# Patient Record
Sex: FEMALE | Race: WHITE | Employment: UNEMPLOYED | URBAN - NONMETROPOLITAN AREA
[De-identification: names, ages, dates, MRNs, and addresses within clinical notes are randomized per-mention and may not be internally consistent; named-entity substitution may affect disease eponyms.]

---
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
## 2022-01-27 ENCOUNTER — ROUTINE PRENATAL (OUTPATIENT)
Dept: OBGYN CLINIC | Age: 22
End: 2022-01-27
Payer: COMMERCIAL

## 2022-01-27 VITALS
SYSTOLIC BLOOD PRESSURE: 94 MMHG | BODY MASS INDEX: 21.3 KG/M2 | WEIGHT: 128 LBS | DIASTOLIC BLOOD PRESSURE: 56 MMHG | HEART RATE: 86 BPM

## 2022-01-27 DIAGNOSIS — Z34.00 ENCOUNTER FOR SUPERVISION OF NORMAL INTRAUTERINE PREGNANCY IN PRIMIGRAVIDA, ANTEPARTUM: ICD-10-CM

## 2022-01-27 DIAGNOSIS — Z34.92 PRENATAL CARE, SECOND TRIMESTER: Primary | ICD-10-CM

## 2022-01-27 PROCEDURE — G8484 FLU IMMUNIZE NO ADMIN: HCPCS | Performed by: OBSTETRICS & GYNECOLOGY

## 2022-01-27 PROCEDURE — G8420 CALC BMI NORM PARAMETERS: HCPCS | Performed by: OBSTETRICS & GYNECOLOGY

## 2022-01-27 PROCEDURE — 1036F TOBACCO NON-USER: CPT | Performed by: OBSTETRICS & GYNECOLOGY

## 2022-01-27 PROCEDURE — G8427 DOCREV CUR MEDS BY ELIG CLIN: HCPCS | Performed by: OBSTETRICS & GYNECOLOGY

## 2022-01-27 PROCEDURE — 99213 OFFICE O/P EST LOW 20 MIN: CPT | Performed by: OBSTETRICS & GYNECOLOGY

## 2022-01-27 NOTE — PROGRESS NOTES
Patient's last menstrual period was 09/23/2021 (within days). Please reference prenatal and OB flow chart for further information  PT here to.day for routine prenatal care  Pt endorses fetal movement and denies loss of fluid, contractions or vaginal bleeding  Is here for prenatal visit she is doing well. She has some orthostatic changes in her blood pressure with movement. We discussed this at length. No bleeding panorama Horizon normal  ROS:  Pt denies headache, vision changes, right upper quadrant pain, dysuria, or nausea/vomiting,     PE:  BP (!) 94/56   Pulse 86   Wt 128 lb (58.1 kg)   LMP 09/23/2021 (Within Days)   BMI 21.30 kg/m²   Gen - Alert and oriented x 3  HEENT- NC/AT, CVS - RRR, Lungs - CTAB  Abd - FH 16        ASSESSMENT AND PLAN:   IUP at 16 weeks and 4 days  Plan patient to follow-up in 4 weeks. Ultrasound prior to next visit.

## 2022-02-18 ENCOUNTER — HOSPITAL ENCOUNTER (OUTPATIENT)
Dept: ULTRASOUND IMAGING | Age: 22
Discharge: HOME | End: 2022-02-20
Payer: COMMERCIAL

## 2022-02-18 DIAGNOSIS — Z34.92 PRENATAL CARE, SECOND TRIMESTER: ICD-10-CM

## 2022-02-18 PROCEDURE — 76805 OB US >/= 14 WKS SNGL FETUS: CPT

## 2022-02-24 ENCOUNTER — ROUTINE PRENATAL (OUTPATIENT)
Dept: OBGYN CLINIC | Age: 22
End: 2022-02-24
Payer: COMMERCIAL

## 2022-02-24 VITALS
BODY MASS INDEX: 22.63 KG/M2 | DIASTOLIC BLOOD PRESSURE: 60 MMHG | SYSTOLIC BLOOD PRESSURE: 102 MMHG | HEART RATE: 93 BPM | WEIGHT: 136 LBS

## 2022-02-24 DIAGNOSIS — Z34.92 PRENATAL CARE, SECOND TRIMESTER: Primary | ICD-10-CM

## 2022-02-24 PROCEDURE — G8484 FLU IMMUNIZE NO ADMIN: HCPCS | Performed by: OBSTETRICS & GYNECOLOGY

## 2022-02-24 PROCEDURE — 99213 OFFICE O/P EST LOW 20 MIN: CPT | Performed by: OBSTETRICS & GYNECOLOGY

## 2022-02-24 PROCEDURE — 1036F TOBACCO NON-USER: CPT | Performed by: OBSTETRICS & GYNECOLOGY

## 2022-02-24 PROCEDURE — G8420 CALC BMI NORM PARAMETERS: HCPCS | Performed by: OBSTETRICS & GYNECOLOGY

## 2022-02-24 PROCEDURE — G8428 CUR MEDS NOT DOCUMENT: HCPCS | Performed by: OBSTETRICS & GYNECOLOGY

## 2022-02-24 NOTE — PROGRESS NOTES
Patient's last menstrual period was 09/23/2021 (within days). Please reference prenatal and OB flow chart for further information  PT here today for routine prenatal care  Pt endorses fetal movement and denies loss of fluid, contractions or vaginal bleeding  Patient is here for prenatal visit at 20 weeks and 3 days. She is feeling fine. Ultrasound look good except one some visualized area which was a three-vessel cord  ROS:  Pt denies headache, vision changes, right upper quadrant pain, dysuria, or nausea/vomiting,     PE:  /60   Pulse 93   Wt 136 lb (61.7 kg)   LMP 09/23/2021 (Within Days)   BMI 22.63 kg/m²   Gen - Alert and oriented x 3  HEENT- NC/AT, CVS - RRR, Lungs - CTAB  Abd - FH 20        ASSESSMENT AND PLAN:   IUP at 20 weeks  Some visualized three-vessel cord  Patient to follow-up in 4 weeks.   Repeat ultrasound for serial growth as well as the visualization of the three-vessel cord

## 2022-03-21 ENCOUNTER — HOSPITAL ENCOUNTER (OUTPATIENT)
Dept: ULTRASOUND IMAGING | Age: 22
Discharge: HOME | End: 2022-03-23
Payer: COMMERCIAL

## 2022-03-21 DIAGNOSIS — Z34.92 PRENATAL CARE, SECOND TRIMESTER: ICD-10-CM

## 2022-03-21 PROCEDURE — 76817 TRANSVAGINAL US OBSTETRIC: CPT

## 2022-03-21 PROCEDURE — 76815 OB US LIMITED FETUS(S): CPT

## 2022-03-24 ENCOUNTER — ROUTINE PRENATAL (OUTPATIENT)
Dept: OBGYN CLINIC | Age: 22
End: 2022-03-24
Payer: COMMERCIAL

## 2022-03-24 VITALS
DIASTOLIC BLOOD PRESSURE: 58 MMHG | WEIGHT: 145 LBS | BODY MASS INDEX: 24.13 KG/M2 | SYSTOLIC BLOOD PRESSURE: 88 MMHG | HEART RATE: 88 BPM

## 2022-03-24 DIAGNOSIS — B00.9 HERPES SIMPLEX INFECTION IN MOTHER DURING SECOND TRIMESTER OF PREGNANCY: ICD-10-CM

## 2022-03-24 DIAGNOSIS — Z3A.24 24 WEEKS GESTATION OF PREGNANCY: ICD-10-CM

## 2022-03-24 DIAGNOSIS — O98.512 HERPES SIMPLEX INFECTION IN MOTHER DURING SECOND TRIMESTER OF PREGNANCY: ICD-10-CM

## 2022-03-24 DIAGNOSIS — Z34.02 SUPERVISION OF NORMAL FIRST PREGNANCY IN SECOND TRIMESTER: Primary | ICD-10-CM

## 2022-03-24 PROCEDURE — G8427 DOCREV CUR MEDS BY ELIG CLIN: HCPCS | Performed by: ADVANCED PRACTICE MIDWIFE

## 2022-03-24 PROCEDURE — 1036F TOBACCO NON-USER: CPT | Performed by: ADVANCED PRACTICE MIDWIFE

## 2022-03-24 PROCEDURE — 99214 OFFICE O/P EST MOD 30 MIN: CPT | Performed by: ADVANCED PRACTICE MIDWIFE

## 2022-03-24 PROCEDURE — G8420 CALC BMI NORM PARAMETERS: HCPCS | Performed by: ADVANCED PRACTICE MIDWIFE

## 2022-03-24 PROCEDURE — G8484 FLU IMMUNIZE NO ADMIN: HCPCS | Performed by: ADVANCED PRACTICE MIDWIFE

## 2022-03-24 RX ORDER — VALACYCLOVIR HYDROCHLORIDE 500 MG/1
500 TABLET, FILM COATED ORAL 2 TIMES DAILY
Qty: 180 TABLET | Refills: 1 | Status: ON HOLD | OUTPATIENT
Start: 2022-03-24 | End: 2022-07-10 | Stop reason: SDUPTHER

## 2022-03-24 ASSESSMENT — ENCOUNTER SYMPTOMS
NAUSEA: 0
CONSTIPATION: 0
DIARRHEA: 0
VOMITING: 0
SHORTNESS OF BREATH: 0
ABDOMINAL PAIN: 0

## 2022-03-24 NOTE — PROGRESS NOTES
SUBJECTIVE:  Denies bleeding, spotting, leaking of fluid, abnormal discharge. Good fetal movement.  Experiencing more frequent and prolonged HSV outbreaks. Discussed beginning suppressive therapy now and continuing until delivery.  Reviewed US results - anatomy completed and WNL, reassuring cervical length.  Discussed 28 week labs, nothing to eat/drink 2 hours before appointment time. Review of Systems   Eyes: Negative for visual disturbance. Respiratory: Negative for shortness of breath. Gastrointestinal: Negative for abdominal pain, constipation, diarrhea, nausea and vomiting. Genitourinary: Positive for genital sores. Negative for dysuria, vaginal bleeding and vaginal discharge. Neurological: Negative for headaches. OBJECTIVE:  3/21/22 US:  24w 4d, EDC 22. Cephalic presentation, anatomy complete and WNL.  grams (1lb. 8oz) at 58%. BELLA WNL. Anterior, Grade 1 placenta, not low-lying. Cervical length 4 cm. Physical Exam  Appearance:  Normal appearance  Cardiovascular:  Normal rate, Capillary refill less than 2 seconds  Pulmonary:  Normal effort, no distress  Abdominal:  No tenderness  MS:  No Swelling, No dependent edema  Skin:  Warm, dry  Neuro:  Alert and oriented x3, reflexes normal.  Psychiatric:  Normal mood and behavior    ASSESSMENT:  23 y.o. female  IUP at 24w3d  Anatomy complete and WNL  Reassuring cervical length    Patient Active Problem List    Diagnosis Date Noted    Herpes simplex infection in mother during second trimester of pregnancy 2022     3/24/22:  Suppressive therapy started      Marijuana use 2021    Mild episode of recurrent major depressive disorder ([facility] [address] 2021    Anxiety 2017      Diagnosis Orders   1. Supervision of normal first pregnancy in second trimester  CBC with Auto Differential    RPR Reflex to Titer and TPPA    Glucose tolerance, 1 hour   2. 24 weeks gestation of pregnancy     3.  Herpes simplex infection in mother during second trimester of pregnancy  valACYclovir (VALTREX) 500 MG tablet       PLAN:  Start Valtrex suppressive therapy  28 week labs at next visit     Follow-Up  Return in about 4 weeks (around 4/21/2022) for Prenatal Visit with 1 hour Glucose Tolerance Test.    DENISE Davis CNM

## 2022-04-28 ENCOUNTER — ROUTINE PRENATAL (OUTPATIENT)
Dept: OBGYN CLINIC | Age: 22
End: 2022-04-28
Payer: COMMERCIAL

## 2022-04-28 VITALS
WEIGHT: 157.6 LBS | SYSTOLIC BLOOD PRESSURE: 102 MMHG | BODY MASS INDEX: 26.23 KG/M2 | DIASTOLIC BLOOD PRESSURE: 68 MMHG | HEART RATE: 94 BPM

## 2022-04-28 DIAGNOSIS — Z3A.29 29 WEEKS GESTATION OF PREGNANCY: ICD-10-CM

## 2022-04-28 DIAGNOSIS — Z34.03 SUPERVISION OF NORMAL FIRST PREGNANCY IN THIRD TRIMESTER: Primary | ICD-10-CM

## 2022-04-28 DIAGNOSIS — Z23 NEED FOR TDAP VACCINATION: ICD-10-CM

## 2022-04-28 DIAGNOSIS — Z34.02 SUPERVISION OF NORMAL FIRST PREGNANCY IN SECOND TRIMESTER: ICD-10-CM

## 2022-04-28 LAB
BASOPHILS ABSOLUTE: 0 K/UL (ref 0–0.2)
BASOPHILS RELATIVE PERCENT: 0.2 %
EOSINOPHILS ABSOLUTE: 0.1 K/UL (ref 0–0.7)
EOSINOPHILS RELATIVE PERCENT: 0.6 %
GLUCOSE, 1HR PP: 71 MG/DL (ref 60–140)
HCT VFR BLD CALC: 33.8 % (ref 37–47)
HEMOGLOBIN: 11.2 G/DL (ref 12–16)
LYMPHOCYTES ABSOLUTE: 1.6 K/UL (ref 1–4.8)
LYMPHOCYTES RELATIVE PERCENT: 14.3 %
MCH RBC QN AUTO: 30.1 PG (ref 27–31.3)
MCHC RBC AUTO-ENTMCNC: 33.2 % (ref 33–37)
MCV RBC AUTO: 90.6 FL (ref 82–100)
MONOCYTES ABSOLUTE: 0.9 K/UL (ref 0.2–0.8)
MONOCYTES RELATIVE PERCENT: 8.3 %
NEUTROPHILS ABSOLUTE: 8.6 K/UL (ref 1.4–6.5)
NEUTROPHILS RELATIVE PERCENT: 76.6 %
PDW BLD-RTO: 13.6 % (ref 11.5–14.5)
PLATELET # BLD: 168 K/UL (ref 130–400)
RBC # BLD: 3.73 M/UL (ref 4.2–5.4)
WBC # BLD: 11.2 K/UL (ref 4.5–11)

## 2022-04-28 PROCEDURE — 99213 OFFICE O/P EST LOW 20 MIN: CPT | Performed by: ADVANCED PRACTICE MIDWIFE

## 2022-04-28 PROCEDURE — 1036F TOBACCO NON-USER: CPT | Performed by: ADVANCED PRACTICE MIDWIFE

## 2022-04-28 PROCEDURE — G8419 CALC BMI OUT NRM PARAM NOF/U: HCPCS | Performed by: ADVANCED PRACTICE MIDWIFE

## 2022-04-28 PROCEDURE — 90715 TDAP VACCINE 7 YRS/> IM: CPT | Performed by: ADVANCED PRACTICE MIDWIFE

## 2022-04-28 PROCEDURE — G8427 DOCREV CUR MEDS BY ELIG CLIN: HCPCS | Performed by: ADVANCED PRACTICE MIDWIFE

## 2022-04-28 PROCEDURE — 90471 IMMUNIZATION ADMIN: CPT | Performed by: ADVANCED PRACTICE MIDWIFE

## 2022-04-28 ASSESSMENT — ENCOUNTER SYMPTOMS
DIARRHEA: 0
CONSTIPATION: 0
ABDOMINAL PAIN: 0
NAUSEA: 0
VOMITING: 0
SHORTNESS OF BREATH: 0

## 2022-04-28 NOTE — PROGRESS NOTES
SUBJECTIVE:  Denies bleeding, spotting, leaking of fluid, abnormal discharge. Good fetal movement.  Collecting 28 week labs today, willing to receive the Tdap vaccine. Review of Systems   Eyes: Negative for visual disturbance. Respiratory: Negative for shortness of breath. Gastrointestinal: Negative for abdominal pain, constipation, diarrhea, nausea and vomiting. Genitourinary: Negative for dysuria, vaginal bleeding and vaginal discharge. Neurological: Negative for headaches. OBJECTIVE:  Physical Exam  Appearance:  Normal appearance  Cardiovascular:  Normal rate, Capillary refill less than 2 seconds  Pulmonary:  Normal effort, no distress  Abdominal:  No tenderness  MS:  No Swelling, No dependent edema  Skin:  Warm, dry  Neuro:  Alert and oriented x3, reflexes normal.   Psychiatric:  Normal mood and behavior    ASSESSMENT:  23 y.o. female  IUP at 29w3d    Patient Active Problem List    Diagnosis Date Noted    Herpes simplex infection in mother during second trimester of pregnancy 2022     3/24/22:  Suppressive therapy started      Marijuana use 2021    Mild episode of recurrent major depressive disorder ([facility] [address] 2021    Anxiety 2017      Diagnosis Orders   1. Supervision of normal first pregnancy in third trimester     2. 29 weeks gestation of pregnancy     3. Need for Tdap vaccination         PLAN:  Collecting 28 week labs  Tdap vaccine     Follow-Up  Return in about 2 weeks (around 2022) for Prenatal Care Visit.     DENISE Roberto CNM

## 2022-04-29 LAB — RPR: NORMAL

## 2022-05-19 ENCOUNTER — ROUTINE PRENATAL (OUTPATIENT)
Dept: OBGYN CLINIC | Age: 22
End: 2022-05-19
Payer: COMMERCIAL

## 2022-05-19 VITALS
SYSTOLIC BLOOD PRESSURE: 100 MMHG | DIASTOLIC BLOOD PRESSURE: 64 MMHG | BODY MASS INDEX: 27.36 KG/M2 | HEART RATE: 104 BPM | WEIGHT: 164.4 LBS

## 2022-05-19 DIAGNOSIS — Z36.89 ENCOUNTER FOR ULTRASOUND TO ASSESS INTERVAL GROWTH OF FETUS: ICD-10-CM

## 2022-05-19 DIAGNOSIS — Z34.93 PRENATAL CARE, THIRD TRIMESTER: Primary | ICD-10-CM

## 2022-05-19 DIAGNOSIS — Z3A.32 32 WEEKS GESTATION OF PREGNANCY: ICD-10-CM

## 2022-05-19 PROCEDURE — G8419 CALC BMI OUT NRM PARAM NOF/U: HCPCS | Performed by: ADVANCED PRACTICE MIDWIFE

## 2022-05-19 PROCEDURE — 99213 OFFICE O/P EST LOW 20 MIN: CPT | Performed by: ADVANCED PRACTICE MIDWIFE

## 2022-05-19 PROCEDURE — 1036F TOBACCO NON-USER: CPT | Performed by: ADVANCED PRACTICE MIDWIFE

## 2022-05-19 PROCEDURE — G8427 DOCREV CUR MEDS BY ELIG CLIN: HCPCS | Performed by: ADVANCED PRACTICE MIDWIFE

## 2022-05-19 ASSESSMENT — ENCOUNTER SYMPTOMS
SHORTNESS OF BREATH: 0
VOMITING: 0
CONSTIPATION: 0
NAUSEA: 0
ABDOMINAL PAIN: 0
DIARRHEA: 0

## 2022-05-19 NOTE — PROGRESS NOTES
SUBJECTIVE:  Denies bleeding, spotting, leaking of fluid, abnormal discharge. Good fetal movement.  Doing well, no concerns   Discussed scheduling a growth US in the next 1-2 weeks.  Reviewed 28 week labs, normal 1 hour GTT. Review of Systems   Eyes: Negative for visual disturbance. Respiratory: Negative for shortness of breath. Gastrointestinal: Negative for abdominal pain, constipation, diarrhea, nausea and vomiting. Genitourinary: Negative for dysuria, vaginal bleeding and vaginal discharge. Neurological: Negative for headaches. OBJECTIVE:  22 Labs:  · 1 Hour GTT - 71  · RPR - Non-Reactive  · Hgb/Hct - 11.2 & 34%    Physical Exam  Appearance:  Normal appearance  Cardiovascular:  Normal rate, Capillary refill less than 2 seconds  Pulmonary:  Normal effort, no distress  Abdominal:  No tenderness  MS:  No Swelling, No dependent edema  Skin:  Warm, dry  Neuro:  Alert and oriented x3, reflexes normal.  Psychiatric:  Normal mood and behavior    ASSESSMENT:  [address] y.o. female  IUP at 32w3d    Patient Active Problem List    Diagnosis Date Noted    Herpes simplex infection in mother during second trimester of pregnancy 2022     3/24/22:  Suppressive therapy started      Marijuana use 2021    Mild episode of recurrent major depressive disorder ([facility] [address] 2021    Anxiety 2017      Diagnosis Orders   1. Prenatal care, third trimester  US OB 1 OR MORE FETUS LIMITED   2. 32 week prematurity     3. Encounter for ultrasound to assess interval growth of fetus  US OB 1 OR MORE FETUS LIMITED       PLAN:  Schedule growth US in 1-2 weeks    Follow-Up  Return in about 2 weeks (around 2022) for Prenatal Care Visit.     DENISE Spears CNM

## 2022-05-26 ENCOUNTER — HOSPITAL ENCOUNTER (OUTPATIENT)
Dept: ULTRASOUND IMAGING | Age: 22
Discharge: HOME | End: 2022-05-28
Payer: COMMERCIAL

## 2022-05-26 DIAGNOSIS — Z36.89 ENCOUNTER FOR ULTRASOUND TO ASSESS INTERVAL GROWTH OF FETUS: ICD-10-CM

## 2022-05-26 DIAGNOSIS — Z34.93 PRENATAL CARE, THIRD TRIMESTER: ICD-10-CM

## 2022-05-26 PROCEDURE — 76815 OB US LIMITED FETUS(S): CPT

## 2022-05-31 NOTE — RESULT ENCOUNTER NOTE
5/26/22 US:  33w 6d, EDC 7/8/22. Cephalic presentation, anatomy WNL. EFW 2222 grams (4lb. 14oz) at 46%. BELLA 9.5 cm. Anterior, Grade 3 placenta. Cervical length 3.3 cm.

## 2022-06-02 ENCOUNTER — ROUTINE PRENATAL (OUTPATIENT)
Dept: OBGYN CLINIC | Age: 22
End: 2022-06-02
Payer: COMMERCIAL

## 2022-06-02 VITALS
BODY MASS INDEX: 28.69 KG/M2 | SYSTOLIC BLOOD PRESSURE: 102 MMHG | DIASTOLIC BLOOD PRESSURE: 68 MMHG | WEIGHT: 172.4 LBS | HEART RATE: 119 BPM

## 2022-06-02 DIAGNOSIS — Z3A.34 34 WEEKS GESTATION OF PREGNANCY: ICD-10-CM

## 2022-06-02 DIAGNOSIS — Z34.93 PRENATAL CARE, THIRD TRIMESTER: Primary | ICD-10-CM

## 2022-06-02 PROCEDURE — G8419 CALC BMI OUT NRM PARAM NOF/U: HCPCS | Performed by: ADVANCED PRACTICE MIDWIFE

## 2022-06-02 PROCEDURE — G8427 DOCREV CUR MEDS BY ELIG CLIN: HCPCS | Performed by: ADVANCED PRACTICE MIDWIFE

## 2022-06-02 PROCEDURE — 99212 OFFICE O/P EST SF 10 MIN: CPT | Performed by: ADVANCED PRACTICE MIDWIFE

## 2022-06-02 PROCEDURE — 1036F TOBACCO NON-USER: CPT | Performed by: ADVANCED PRACTICE MIDWIFE

## 2022-06-02 ASSESSMENT — ENCOUNTER SYMPTOMS
DIARRHEA: 0
SHORTNESS OF BREATH: 0
VOMITING: 0
ABDOMINAL PAIN: 0
CONSTIPATION: 0
NAUSEA: 0

## 2022-06-09 ENCOUNTER — ROUTINE PRENATAL (OUTPATIENT)
Dept: OBGYN CLINIC | Age: 22
End: 2022-06-09
Payer: COMMERCIAL

## 2022-06-09 VITALS
WEIGHT: 174.2 LBS | BODY MASS INDEX: 28.99 KG/M2 | DIASTOLIC BLOOD PRESSURE: 66 MMHG | SYSTOLIC BLOOD PRESSURE: 102 MMHG | HEART RATE: 119 BPM

## 2022-06-09 DIAGNOSIS — Z3A.35 35 WEEKS GESTATION OF PREGNANCY: ICD-10-CM

## 2022-06-09 DIAGNOSIS — Z34.03 PRENATAL CARE, FIRST PREGNANCY, THIRD TRIMESTER: ICD-10-CM

## 2022-06-09 DIAGNOSIS — Z34.03 PRENATAL CARE, FIRST PREGNANCY, THIRD TRIMESTER: Primary | ICD-10-CM

## 2022-06-09 PROCEDURE — 99212 OFFICE O/P EST SF 10 MIN: CPT | Performed by: ADVANCED PRACTICE MIDWIFE

## 2022-06-09 PROCEDURE — G8419 CALC BMI OUT NRM PARAM NOF/U: HCPCS | Performed by: ADVANCED PRACTICE MIDWIFE

## 2022-06-09 PROCEDURE — 1036F TOBACCO NON-USER: CPT | Performed by: ADVANCED PRACTICE MIDWIFE

## 2022-06-09 PROCEDURE — G8428 CUR MEDS NOT DOCUMENT: HCPCS | Performed by: ADVANCED PRACTICE MIDWIFE

## 2022-06-09 ASSESSMENT — ENCOUNTER SYMPTOMS
VOMITING: 0
NAUSEA: 0
DIARRHEA: 0
SHORTNESS OF BREATH: 0
ABDOMINAL PAIN: 0
CONSTIPATION: 0

## 2022-06-09 NOTE — PROGRESS NOTES
SUBJECTIVE:  Denies bleeding, spotting, leaking of fluid, abnormal discharge. Good fetal movement.  Doing well, no concerns.  Reviewed screening for GBS.  History of HSV, taking Valtrex as prescribed.  Next visit with Dr. Lisa Hutchison    Review of Systems   Eyes: Negative for visual disturbance. Respiratory: Negative for shortness of breath. Gastrointestinal: Negative for abdominal pain, constipation, diarrhea, nausea and vomiting. Genitourinary: Negative for dysuria, vaginal bleeding and vaginal discharge. Neurological: Negative for headaches. OBJECTIVE:  Physical Exam  Appearance:  Normal appearance  Cardiovascular:  Normal rate, Capillary refill less than 2 seconds  Pulmonary:  Normal effort, no distress  Abdominal:  No tenderness  MS:  No Swelling, No dependent edema  Skin:  Warm, dry  Neuro:  Alert and oriented x3, reflexes normal.  Psychiatric:  Normal mood and behavior    ASSESSMENT:  23 y.o. female  IUP at 35w3d    Patient Active Problem List    Diagnosis Date Noted    Herpes simplex infection in mother during second trimester of pregnancy 2022     3/24/22:  Suppressive therapy started      Marijuana use 2021    Mild episode of recurrent major depressive disorder ([facility] [address] 2021    Anxiety 2017      Diagnosis Orders   1. Prenatal care, first pregnancy, third trimester  Culture, Strep B Screen, Vaginal/Rectal   2. 35 weeks gestation of pregnancy         PLAN:  HSV - suppressive therapy started 3/24/22  GBS collected  Next visit with Dr. Espinosa Shoulder  Return in about 1 week (around 2022) for Prenatal Care Visit with Lisa Hutchison ([facility]).     DENISE Clarke CNM

## 2022-06-12 LAB — GROUP B STREP CULTURE: NORMAL

## 2022-06-16 ENCOUNTER — ROUTINE PRENATAL (OUTPATIENT)
Dept: OBGYN CLINIC | Age: 22
End: 2022-06-16
Payer: COMMERCIAL

## 2022-06-16 VITALS
DIASTOLIC BLOOD PRESSURE: 66 MMHG | WEIGHT: 177 LBS | BODY MASS INDEX: 29.45 KG/M2 | SYSTOLIC BLOOD PRESSURE: 102 MMHG | HEART RATE: 99 BPM

## 2022-06-16 DIAGNOSIS — R30.0 DYSURIA: ICD-10-CM

## 2022-06-16 DIAGNOSIS — B37.31 YEAST VAGINITIS: ICD-10-CM

## 2022-06-16 DIAGNOSIS — R30.0 DYSURIA: Primary | ICD-10-CM

## 2022-06-16 DIAGNOSIS — Z34.03 PRENATAL CARE, FIRST PREGNANCY, THIRD TRIMESTER: ICD-10-CM

## 2022-06-16 LAB
BACTERIA: ABNORMAL /HPF
BILIRUBIN URINE: NEGATIVE
BLOOD, URINE: ABNORMAL
CLARITY: ABNORMAL
COLOR: YELLOW
EPITHELIAL CELLS, UA: ABNORMAL /HPF (ref 0–5)
GLUCOSE URINE: NEGATIVE MG/DL
HYALINE CASTS: ABNORMAL /HPF (ref 0–5)
KETONES, URINE: NEGATIVE MG/DL
LEUKOCYTE ESTERASE, URINE: ABNORMAL
NITRITE, URINE: NEGATIVE
PH UA: 7 (ref 5–9)
PROTEIN UA: ABNORMAL MG/DL
RBC UA: ABNORMAL /HPF (ref 0–5)
SPECIFIC GRAVITY UA: 1.01 (ref 1–1.03)
UROBILINOGEN, URINE: 0.2 E.U./DL
WBC UA: >100 /HPF (ref 0–5)
YEAST: PRESENT /HPF

## 2022-06-16 PROCEDURE — 99213 OFFICE O/P EST LOW 20 MIN: CPT | Performed by: OBSTETRICS & GYNECOLOGY

## 2022-06-16 PROCEDURE — G8419 CALC BMI OUT NRM PARAM NOF/U: HCPCS | Performed by: OBSTETRICS & GYNECOLOGY

## 2022-06-16 PROCEDURE — G8428 CUR MEDS NOT DOCUMENT: HCPCS | Performed by: OBSTETRICS & GYNECOLOGY

## 2022-06-16 PROCEDURE — 1036F TOBACCO NON-USER: CPT | Performed by: OBSTETRICS & GYNECOLOGY

## 2022-06-16 NOTE — PROGRESS NOTES
+ blood . Leuk, and protein in urine. Sent UA/ CS . Vaginal pressure, brownish tint when wiping. Feels like she needs to have a bm as well.  Pt is heaving regular bowel movements

## 2022-06-16 NOTE — PROGRESS NOTES
Patient's last menstrual period was 09/23/2021 (within days). Please reference prenatal and OB flow chart for further information  PT here today for routine prenatal care  Pt endorses fetal movement and denies loss of fluid, contractions or vaginal bleeding  Presents for prenatal visit at 36 weeks and 3 days.   She complains of rectal pressure vaginal pressure and brownish discharge  ROS:  Pt denies headache, vision changes, right upper quadrant pain, dysuria, or nausea/vomiting,     PE:  /66   Pulse 99   Wt 177 lb (80.3 kg)   LMP 09/23/2021 (Within Days)   BMI 29.45 kg/m²   Gen - Alert and oriented x 3  HEENT- NC/AT, CVS - RRR, Lungs - CTAB  Abd - FH 37    Cervix is 2 cm dilated 50% effaced vertex is at a -1 station hard stool was palpated in the rectum and she has a heavy yeast discharge      ASSESSMENT AND PLAN:   IUP at 36 weeks  Yeast vaginitis  Terazol 7  Patient reassured about what she is feeling at this stage of the pregnancy  Patient to follow-up in 1 week

## 2022-06-17 LAB — URINE CULTURE, ROUTINE: NORMAL

## 2022-06-23 ENCOUNTER — ROUTINE PRENATAL (OUTPATIENT)
Dept: OBGYN CLINIC | Age: 22
End: 2022-06-23
Payer: COMMERCIAL

## 2022-06-23 VITALS
SYSTOLIC BLOOD PRESSURE: 98 MMHG | HEART RATE: 96 BPM | WEIGHT: 181 LBS | BODY MASS INDEX: 30.12 KG/M2 | DIASTOLIC BLOOD PRESSURE: 70 MMHG

## 2022-06-23 DIAGNOSIS — Z3A.37 37 WEEKS GESTATION OF PREGNANCY: ICD-10-CM

## 2022-06-23 DIAGNOSIS — Z34.93 PRENATAL CARE, THIRD TRIMESTER: Primary | ICD-10-CM

## 2022-06-23 PROCEDURE — H1000 PRENATAL CARE ATRISK ASSESSM: HCPCS | Performed by: ADVANCED PRACTICE MIDWIFE

## 2022-06-23 PROCEDURE — G8427 DOCREV CUR MEDS BY ELIG CLIN: HCPCS | Performed by: ADVANCED PRACTICE MIDWIFE

## 2022-06-23 PROCEDURE — 99212 OFFICE O/P EST SF 10 MIN: CPT | Performed by: ADVANCED PRACTICE MIDWIFE

## 2022-06-23 PROCEDURE — 1036F TOBACCO NON-USER: CPT | Performed by: ADVANCED PRACTICE MIDWIFE

## 2022-06-23 PROCEDURE — G8419 CALC BMI OUT NRM PARAM NOF/U: HCPCS | Performed by: ADVANCED PRACTICE MIDWIFE

## 2022-06-23 ASSESSMENT — ENCOUNTER SYMPTOMS
ABDOMINAL PAIN: 0
VOMITING: 0
CONSTIPATION: 0
DIARRHEA: 0
SHORTNESS OF BREATH: 0
NAUSEA: 0

## 2022-06-30 ENCOUNTER — ROUTINE PRENATAL (OUTPATIENT)
Dept: OBGYN CLINIC | Age: 22
End: 2022-06-30
Payer: COMMERCIAL

## 2022-06-30 VITALS
BODY MASS INDEX: 31.22 KG/M2 | SYSTOLIC BLOOD PRESSURE: 110 MMHG | WEIGHT: 187.6 LBS | DIASTOLIC BLOOD PRESSURE: 68 MMHG | HEART RATE: 112 BPM

## 2022-06-30 DIAGNOSIS — Z3A.38 38 WEEKS GESTATION OF PREGNANCY: ICD-10-CM

## 2022-06-30 DIAGNOSIS — Z34.93 PRENATAL CARE, THIRD TRIMESTER: Primary | ICD-10-CM

## 2022-06-30 PROCEDURE — 99212 OFFICE O/P EST SF 10 MIN: CPT | Performed by: ADVANCED PRACTICE MIDWIFE

## 2022-06-30 PROCEDURE — G8419 CALC BMI OUT NRM PARAM NOF/U: HCPCS | Performed by: ADVANCED PRACTICE MIDWIFE

## 2022-06-30 PROCEDURE — 1036F TOBACCO NON-USER: CPT | Performed by: ADVANCED PRACTICE MIDWIFE

## 2022-06-30 PROCEDURE — G8427 DOCREV CUR MEDS BY ELIG CLIN: HCPCS | Performed by: ADVANCED PRACTICE MIDWIFE

## 2022-06-30 ASSESSMENT — ENCOUNTER SYMPTOMS
ABDOMINAL PAIN: 0
CONSTIPATION: 0
VOMITING: 0
NAUSEA: 0
DIARRHEA: 0
SHORTNESS OF BREATH: 0

## 2022-06-30 NOTE — PROGRESS NOTES
SUBJECTIVE:  Denies bleeding, spotting, leaking of fluid, abnormal discharge. Good fetal movement.  Doing well, no concerns.  Prefers to wait for natural labor onset    Review of Systems   Eyes: Negative for visual disturbance. Respiratory: Negative for shortness of breath. Gastrointestinal: Negative for abdominal pain, constipation, diarrhea, nausea and vomiting. Genitourinary: Negative for dysuria, vaginal bleeding and vaginal discharge. Neurological: Negative for headaches. OBJECTIVE:  Cervical Exam:  3/25%/-3, posterior, medium, no show. Physical Exam  Appearance:  Normal appearance  Cardiovascular:  Normal rate, Capillary refill less than 2 seconds  Pulmonary:  Normal effort, no distress  Abdominal:  No tenderness  MS:  No Swelling, No dependent edema  Skin:  Warm, dry  Neuro:  Alert and oriented x3, reflexes normal.  Psychiatric:  Normal mood and behavior    ASSESSMENT:  23 y.o. female  IUP at 38w3d  GBS Negative    Patient Active Problem List    Diagnosis Date Noted    Herpes simplex infection in mother during second trimester of pregnancy 2022     3/24/22:  Suppressive therapy started      Marijuana use 2021    Mild episode of recurrent major depressive disorder ([facility] [address] 2021    Anxiety 2017      Diagnosis Orders   1. Prenatal care, third trimester     2. 38 weeks gestation of pregnancy         PLAN:    Follow-Up  Return in about 1 week (around 2022) for Prenatal Care Visit.     DENISE Neves CNM

## 2022-07-05 ENCOUNTER — HOSPITAL ENCOUNTER (OUTPATIENT)
Age: 22
Discharge: HOME | DRG: 560 | End: 2022-07-05
Attending: ADVANCED PRACTICE MIDWIFE | Admitting: OBSTETRICS & GYNECOLOGY
Payer: COMMERCIAL

## 2022-07-05 VITALS
DIASTOLIC BLOOD PRESSURE: 76 MMHG | RESPIRATION RATE: 16 BRPM | SYSTOLIC BLOOD PRESSURE: 138 MMHG | TEMPERATURE: 98.2 F | OXYGEN SATURATION: 98 % | HEART RATE: 96 BPM

## 2022-07-05 LAB
AMPHETAMINE SCREEN, URINE: NORMAL
BACTERIA: ABNORMAL /HPF
BARBITURATE SCREEN URINE: NORMAL
BENZODIAZEPINE SCREEN, URINE: NORMAL
BILIRUBIN URINE: NEGATIVE
BLOOD, URINE: NEGATIVE
CANNABINOID SCREEN URINE: NORMAL
CLARITY: ABNORMAL
COCAINE METABOLITE SCREEN URINE: NORMAL
COLOR: YELLOW
EPITHELIAL CELLS, UA: ABNORMAL /HPF (ref 0–5)
FENTANYL SCREEN, URINE: NORMAL
GLUCOSE URINE: NEGATIVE MG/DL
HYALINE CASTS: ABNORMAL /HPF (ref 0–5)
KETONES, URINE: NEGATIVE MG/DL
LEUKOCYTE ESTERASE, URINE: ABNORMAL
Lab: NORMAL
METHADONE SCREEN, URINE: NORMAL
NITRITE, URINE: NEGATIVE
OPIATE SCREEN URINE: NORMAL
OXYCODONE URINE: NORMAL
PH UA: 7 (ref 5–9)
PHENCYCLIDINE SCREEN URINE: NORMAL
PROPOXYPHENE SCREEN: NORMAL
PROTEIN UA: NEGATIVE MG/DL
RBC UA: ABNORMAL /HPF (ref 0–5)
SPECIFIC GRAVITY UA: 1.01 (ref 1–1.03)
URINE REFLEX TO CULTURE: YES
UROBILINOGEN, URINE: 0.2 E.U./DL
WBC UA: ABNORMAL /HPF (ref 0–5)

## 2022-07-05 PROCEDURE — 81001 URINALYSIS AUTO W/SCOPE: CPT

## 2022-07-05 PROCEDURE — 99283 EMERGENCY DEPT VISIT LOW MDM: CPT

## 2022-07-05 PROCEDURE — 99213 OFFICE O/P EST LOW 20 MIN: CPT | Performed by: OBSTETRICS & GYNECOLOGY

## 2022-07-05 PROCEDURE — 80307 DRUG TEST PRSMV CHEM ANLYZR: CPT

## 2022-07-05 PROCEDURE — 87086 URINE CULTURE/COLONY COUNT: CPT

## 2022-07-05 NOTE — ED TRIAGE NOTES
Department of Obstetrics and Gynecology  Labor and Delivery  Irma Jimenes MD: [facility] Triage Note      SUBJECTIVE:  Patient is a 24 yo  female @ 39.1 weeks who presents with one week of irregular contractions, that worsened since 0600 this morning. She denies any LOF, VB, or VD. She reports +FM. She had recent yeast infection, treated with OTC medications. She denies any recent coitus. She was last seen in office on 2022 and she was 3 cm dilated. OBJECTIVE    ROS:   Gen: Negative  Abdomen: +FM, contractions  Pelvis: Pressure  Rest of systems reviewed and found to be negative. Vitals:  /76 Comment: left arm  Pulse 96   Temp 98.2 °F (36.8 °C) (Oral)   Resp 16   LMP 2021 (Within Days)   SpO2 98%     PE:   Gen: AxO x 3, in NAD  CV: RRR  Lungs: CTAB  Abdomen: Gravid, +FM  Cervix:           Dilation: 3         Effacement: 30        Station:  -3         Fetal heart rate:  Category 1       Baseline Heart Rate:  140s R       Accelerations:  present       Decelerations:  none       Variability:  moderate    Contraction frequency: q3-5 minutes      DATA:  Results     Component Value Units   Urine Drug Screen [[id number]]    Collected: 22    Updated: 22 104    Specimen Source: Urine, clean catch     Amphetamine Screen, Urine Neg    Barbiturate Screen, Ur Neg    Benzodiazepine Screen, Urine Neg    Cannabinoid Scrn, Ur Neg    Cocaine Metabolite Screen, Urine Neg    Opiate Scrn, Ur Neg    PCP Screen, Urine Neg    Methadone Screen, Urine Neg    Propoxyphene Scrn, Ur Neg    Oxycodone Urine Neg    FENTANYL SCREEN, URINE Neg    Drug Screen Comment: see below    Comment: This method is a screening test to detect only these drug   classes as part of a medical workup.  Confirmatory testing   by another method should be ordered if clinically indicated.        Culture, Urine [[id number]]    Collected: 22 103    Updated: 22 104    Urinalysis with Reflex to Culture [[id number]] (Abnormal)    Collected: 07/05/22 1025    Updated: 07/05/22 1038    Specimen Source: Urine     Color, UA Yellow    Clarity, UA CLOUDY Abnormal     Glucose, Ur Negative mg/dL    Bilirubin Urine Negative    Ketones, Urine Negative mg/dL    Specific Gravity, UA 1.011    Blood, Urine Negative    pH, UA 7.0    Protein, UA Negative mg/dL    Urobilinogen, Urine 0.2 E.U./dL    Nitrite, Urine Negative    Leukocyte Esterase, Urine LARGE Abnormal     Urine Reflex to Culture Yes   Microscopic Urinalysis [[id number]] (Abnormal)    Collected: 07/05/22 1025    Updated: 07/05/22 1038     Bacteria, UA MODERATE Abnormal  /HPF    Hyaline Casts, UA 3-5 /HPF    WBC, UA  Abnormal  /HPF    RBC, UA 0-2 /HPF    Epithelial Cells, UA 6-10          ASSESSMENT & PLAN:      Assessment:   1. IUP @ 39.1 weeks  2. Latent Labor  3. Possible UTI      Plan:   1. NST reactive. Reassurance given  2. No cervical change since 6/30/2022. Will discharge home with labor precautions, fetal kick counts, and to follow up with Pako Nolen on 7/7/22. 3. Urine culture pending. Will treat accordingly.

## 2022-07-05 NOTE — PROGRESS NOTES
Dr. Audra Arboleda bedside to speak with patient. Patient given the option to walk and see if cervical change is made or be discharged to home and return if contractions progressively get worse. Patient would like to be discharged at this time. Labor precautions given.

## 2022-07-05 NOTE — PROGRESS NOTES
Patient came into triage stating that she starting boo at about 0630 and the pain has increased. Patient denies leaking of fluid, vaginal bleeding, and recent sexual intercourse. Patient states positive fetal movement. Patient denies headache, blurred vision, vomiting (but a little bit of nausea), and constipation. Patient states \"softer\" stools for about the past two days.

## 2022-07-05 NOTE — PROGRESS NOTES
Discharge paperwork reviewed with patient. Labor precautions given. Patient verbalized understanding and denied any further questions at this time. Patient ambulated off OB unit in stable condition.

## 2022-07-06 LAB — URINE CULTURE, ROUTINE: NORMAL

## 2022-07-07 ENCOUNTER — ROUTINE PRENATAL (OUTPATIENT)
Dept: OBGYN CLINIC | Age: 22
End: 2022-07-07
Payer: COMMERCIAL

## 2022-07-07 VITALS
SYSTOLIC BLOOD PRESSURE: 118 MMHG | DIASTOLIC BLOOD PRESSURE: 88 MMHG | HEART RATE: 104 BPM | BODY MASS INDEX: 31.45 KG/M2 | WEIGHT: 189 LBS

## 2022-07-07 DIAGNOSIS — N89.8 VAGINAL DISCHARGE: ICD-10-CM

## 2022-07-07 DIAGNOSIS — B96.89 BACTERIAL VAGINITIS: ICD-10-CM

## 2022-07-07 DIAGNOSIS — B37.31 CANDIDAL VAGINITIS: ICD-10-CM

## 2022-07-07 DIAGNOSIS — N89.8 VAGINAL IRRITATION: ICD-10-CM

## 2022-07-07 DIAGNOSIS — N76.0 BACTERIAL VAGINITIS: ICD-10-CM

## 2022-07-07 DIAGNOSIS — Z3A.39 39 WEEKS GESTATION OF PREGNANCY: ICD-10-CM

## 2022-07-07 DIAGNOSIS — Z34.93 PRENATAL CARE, THIRD TRIMESTER: Primary | ICD-10-CM

## 2022-07-07 LAB
CLUE CELLS: NORMAL
TRICHOMONAS PREP: NORMAL
TRICHOMONAS VAGINALIS SCREEN: NEGATIVE
YEAST WET PREP: NORMAL

## 2022-07-07 PROCEDURE — G8419 CALC BMI OUT NRM PARAM NOF/U: HCPCS | Performed by: ADVANCED PRACTICE MIDWIFE

## 2022-07-07 PROCEDURE — 1036F TOBACCO NON-USER: CPT | Performed by: ADVANCED PRACTICE MIDWIFE

## 2022-07-07 PROCEDURE — G8427 DOCREV CUR MEDS BY ELIG CLIN: HCPCS | Performed by: ADVANCED PRACTICE MIDWIFE

## 2022-07-07 PROCEDURE — 99214 OFFICE O/P EST MOD 30 MIN: CPT | Performed by: ADVANCED PRACTICE MIDWIFE

## 2022-07-07 RX ORDER — FLUCONAZOLE 150 MG/1
TABLET ORAL
Qty: 3 TABLET | Refills: 1 | Status: SHIPPED | OUTPATIENT
Start: 2022-07-07 | End: 2022-09-15

## 2022-07-07 RX ORDER — METRONIDAZOLE 500 MG/1
500 TABLET ORAL 2 TIMES DAILY
Qty: 14 TABLET | Refills: 1 | Status: SHIPPED | OUTPATIENT
Start: 2022-07-07 | End: 2022-09-15

## 2022-07-07 ASSESSMENT — ENCOUNTER SYMPTOMS
VOMITING: 0
ABDOMINAL PAIN: 0
DIARRHEA: 0
CONSTIPATION: 0
SHORTNESS OF BREATH: 0
NAUSEA: 0

## 2022-07-08 ENCOUNTER — ANESTHESIA (OUTPATIENT)
Dept: LABOR AND DELIVERY | Age: 22
DRG: 560 | End: 2022-07-08
Payer: COMMERCIAL

## 2022-07-08 ENCOUNTER — HOSPITAL ENCOUNTER (INPATIENT)
Age: 22
LOS: 2 days | Discharge: HOME | DRG: 560 | End: 2022-07-10
Attending: OBSTETRICS & GYNECOLOGY | Admitting: OBSTETRICS & GYNECOLOGY
Payer: COMMERCIAL

## 2022-07-08 ENCOUNTER — ANESTHESIA EVENT (OUTPATIENT)
Dept: LABOR AND DELIVERY | Age: 22
DRG: 560 | End: 2022-07-08
Payer: COMMERCIAL

## 2022-07-08 DIAGNOSIS — B00.9 HERPES SIMPLEX INFECTION IN MOTHER DURING SECOND TRIMESTER OF PREGNANCY: ICD-10-CM

## 2022-07-08 DIAGNOSIS — O98.512 HERPES SIMPLEX INFECTION IN MOTHER DURING SECOND TRIMESTER OF PREGNANCY: ICD-10-CM

## 2022-07-08 PROBLEM — Z78.9 ADMITTED TO LABOR AND DELIVERY: Status: ACTIVE | Noted: 2022-07-08

## 2022-07-08 LAB
ABO/RH: NORMAL
ALBUMIN SERPL-MCNC: 3.8 G/DL (ref 3.5–4.6)
ALP BLD-CCNC: 173 U/L (ref 40–130)
ALT SERPL-CCNC: 6 U/L (ref 0–33)
AMPHETAMINE SCREEN, URINE: NORMAL
ANION GAP SERPL CALCULATED.3IONS-SCNC: 15 MEQ/L (ref 9–15)
ANTIBODY SCREEN: NORMAL
AST SERPL-CCNC: 16 U/L (ref 0–35)
BACTERIA: NEGATIVE /HPF
BARBITURATE SCREEN URINE: NORMAL
BASOPHILS ABSOLUTE: 0 K/UL (ref 0–0.2)
BASOPHILS RELATIVE PERCENT: 0.2 %
BENZODIAZEPINE SCREEN, URINE: NORMAL
BILIRUB SERPL-MCNC: 0.3 MG/DL (ref 0.2–0.7)
BILIRUBIN URINE: NEGATIVE
BLOOD, URINE: ABNORMAL
BUN BLDV-MCNC: 10 MG/DL (ref 6–20)
CALCIUM SERPL-MCNC: 9.2 MG/DL (ref 8.5–9.9)
CANNABINOID SCREEN URINE: NORMAL
CHLORIDE BLD-SCNC: 103 MEQ/L (ref 95–107)
CLARITY: CLEAR
CO2: 18 MEQ/L (ref 20–31)
COCAINE METABOLITE SCREEN URINE: NORMAL
COLOR: YELLOW
CREAT SERPL-MCNC: 0.64 MG/DL (ref 0.5–0.9)
EOSINOPHILS ABSOLUTE: 0 K/UL (ref 0–0.7)
EOSINOPHILS RELATIVE PERCENT: 0.2 %
EPITHELIAL CELLS, UA: ABNORMAL /HPF (ref 0–5)
FENTANYL SCREEN, URINE: NORMAL
GFR AFRICAN AMERICAN: >60
GFR NON-AFRICAN AMERICAN: >60
GLOBULIN: 2.9 G/DL (ref 2.3–3.5)
GLUCOSE BLD-MCNC: 86 MG/DL (ref 70–99)
GLUCOSE URINE: NEGATIVE MG/DL
HCT VFR BLD CALC: 33.7 % (ref 37–47)
HEMOGLOBIN: 10.8 G/DL (ref 12–16)
HEPATITIS B SURFACE ANTIGEN INTERPRETATION: NORMAL
HYALINE CASTS: ABNORMAL /HPF (ref 0–5)
KETONES, URINE: NEGATIVE MG/DL
LEUKOCYTE ESTERASE, URINE: ABNORMAL
LYMPHOCYTES ABSOLUTE: 2 K/UL (ref 1–4.8)
LYMPHOCYTES RELATIVE PERCENT: 13.9 %
Lab: NORMAL
MCH RBC QN AUTO: 27 PG (ref 27–31.3)
MCHC RBC AUTO-ENTMCNC: 32 % (ref 33–37)
MCV RBC AUTO: 84.5 FL (ref 82–100)
METHADONE SCREEN, URINE: NORMAL
MONOCYTES ABSOLUTE: 1.1 K/UL (ref 0.2–0.8)
MONOCYTES RELATIVE PERCENT: 7.2 %
NEUTROPHILS ABSOLUTE: 11.5 K/UL (ref 1.4–6.5)
NEUTROPHILS RELATIVE PERCENT: 78.5 %
NITRITE, URINE: NEGATIVE
OPIATE SCREEN URINE: NORMAL
OXYCODONE URINE: NORMAL
PDW BLD-RTO: 15 % (ref 11.5–14.5)
PH UA: 7 (ref 5–9)
PHENCYCLIDINE SCREEN URINE: NORMAL
PLACENTA ALPHA MICROGLOBULIN-1: POSITIVE
PLATELET # BLD: 143 K/UL (ref 130–400)
POTASSIUM SERPL-SCNC: 3.9 MEQ/L (ref 3.4–4.9)
PROPOXYPHENE SCREEN: NORMAL
PROTEIN UA: NEGATIVE MG/DL
RBC # BLD: 3.99 M/UL (ref 4.2–5.4)
RBC UA: ABNORMAL /HPF (ref 0–5)
RPR: NORMAL
SARS-COV-2, NAAT: NOT DETECTED
SODIUM BLD-SCNC: 136 MEQ/L (ref 135–144)
SPECIFIC GRAVITY UA: 1.01 (ref 1–1.03)
TOTAL PROTEIN: 6.7 G/DL (ref 6.3–8)
URINE REFLEX TO CULTURE: ABNORMAL
UROBILINOGEN, URINE: 0.2 E.U./DL
WBC # BLD: 14.6 K/UL (ref 4.5–11)
WBC UA: ABNORMAL /HPF (ref 0–5)

## 2022-07-08 PROCEDURE — 86901 BLOOD TYPING SEROLOGIC RH(D): CPT

## 2022-07-08 PROCEDURE — 1220000000 HC SEMI PRIVATE OB R&B

## 2022-07-08 PROCEDURE — 6360000002 HC RX W HCPCS: Performed by: NURSE ANESTHETIST, CERTIFIED REGISTERED

## 2022-07-08 PROCEDURE — 99222 1ST HOSP IP/OBS MODERATE 55: CPT | Performed by: ADVANCED PRACTICE MIDWIFE

## 2022-07-08 PROCEDURE — 3700000025 EPIDURAL BLOCK: Performed by: NURSE ANESTHETIST, CERTIFIED REGISTERED

## 2022-07-08 PROCEDURE — 86900 BLOOD TYPING SEROLOGIC ABO: CPT

## 2022-07-08 PROCEDURE — 2500000003 HC RX 250 WO HCPCS: Performed by: NURSE ANESTHETIST, CERTIFIED REGISTERED

## 2022-07-08 PROCEDURE — 59409 OBSTETRICAL CARE: CPT | Performed by: ADVANCED PRACTICE MIDWIFE

## 2022-07-08 PROCEDURE — 7200000001 HC VAGINAL DELIVERY

## 2022-07-08 PROCEDURE — 2580000003 HC RX 258: Performed by: ADVANCED PRACTICE MIDWIFE

## 2022-07-08 PROCEDURE — 6360000002 HC RX W HCPCS: Performed by: ADVANCED PRACTICE MIDWIFE

## 2022-07-08 PROCEDURE — 6370000000 HC RX 637 (ALT 250 FOR IP): Performed by: ADVANCED PRACTICE MIDWIFE

## 2022-07-08 PROCEDURE — 6370000000 HC RX 637 (ALT 250 FOR IP)

## 2022-07-08 PROCEDURE — 80053 COMPREHEN METABOLIC PANEL: CPT

## 2022-07-08 PROCEDURE — 81001 URINALYSIS AUTO W/SCOPE: CPT

## 2022-07-08 PROCEDURE — 86592 SYPHILIS TEST NON-TREP QUAL: CPT

## 2022-07-08 PROCEDURE — 84112 EVAL AMNIOTIC FLUID PROTEIN: CPT

## 2022-07-08 PROCEDURE — 80307 DRUG TEST PRSMV CHEM ANLYZR: CPT

## 2022-07-08 PROCEDURE — 6360000002 HC RX W HCPCS

## 2022-07-08 PROCEDURE — 85025 COMPLETE CBC W/AUTO DIFF WBC: CPT

## 2022-07-08 PROCEDURE — 36415 COLL VENOUS BLD VENIPUNCTURE: CPT

## 2022-07-08 PROCEDURE — 2500000003 HC RX 250 WO HCPCS: Performed by: ADVANCED PRACTICE MIDWIFE

## 2022-07-08 PROCEDURE — 87340 HEPATITIS B SURFACE AG IA: CPT

## 2022-07-08 PROCEDURE — 0KQM0ZZ REPAIR PERINEUM MUSCLE, OPEN APPROACH: ICD-10-PCS | Performed by: ADVANCED PRACTICE MIDWIFE

## 2022-07-08 PROCEDURE — 86850 RBC ANTIBODY SCREEN: CPT

## 2022-07-08 PROCEDURE — 2500000003 HC RX 250 WO HCPCS

## 2022-07-08 PROCEDURE — 87635 SARS-COV-2 COVID-19 AMP PRB: CPT

## 2022-07-08 PROCEDURE — 3E0P7VZ INTRODUCTION OF HORMONE INTO FEMALE REPRODUCTIVE, VIA NATURAL OR ARTIFICIAL OPENING: ICD-10-PCS | Performed by: ADVANCED PRACTICE MIDWIFE

## 2022-07-08 RX ORDER — SODIUM CHLORIDE, SODIUM LACTATE, POTASSIUM CHLORIDE, AND CALCIUM CHLORIDE .6; .31; .03; .02 G/100ML; G/100ML; G/100ML; G/100ML
500 INJECTION, SOLUTION INTRAVENOUS PRN
Status: DISCONTINUED | OUTPATIENT
Start: 2022-07-08 | End: 2022-07-09

## 2022-07-08 RX ORDER — MORPHINE SULFATE 4 MG/ML
5 INJECTION, SOLUTION INTRAMUSCULAR; INTRAVENOUS EVERY 4 HOURS PRN
Status: DISCONTINUED | OUTPATIENT
Start: 2022-07-08 | End: 2022-07-09

## 2022-07-08 RX ORDER — SODIUM CHLORIDE, SODIUM LACTATE, POTASSIUM CHLORIDE, AND CALCIUM CHLORIDE .6; .31; .03; .02 G/100ML; G/100ML; G/100ML; G/100ML
1000 INJECTION, SOLUTION INTRAVENOUS PRN
Status: DISCONTINUED | OUTPATIENT
Start: 2022-07-08 | End: 2022-07-09

## 2022-07-08 RX ORDER — TRANEXAMIC ACID 100 MG/ML
INJECTION, SOLUTION INTRAVENOUS
Status: DISCONTINUED
Start: 2022-07-08 | End: 2022-07-08 | Stop reason: WASHOUT

## 2022-07-08 RX ORDER — METHYLERGONOVINE MALEATE 0.2 MG/ML
INJECTION INTRAVENOUS
Status: COMPLETED
Start: 2022-07-08 | End: 2022-07-08

## 2022-07-08 RX ORDER — DIPHENOXYLATE HYDROCHLORIDE AND ATROPINE SULFATE 2.5; .025 MG/1; MG/1
2 TABLET ORAL EVERY 6 HOURS PRN
Status: DISCONTINUED | OUTPATIENT
Start: 2022-07-08 | End: 2022-07-11 | Stop reason: HOSPADM

## 2022-07-08 RX ORDER — NALBUPHINE HCL 10 MG/ML
5 AMPUL (ML) INJECTION
Status: DISPENSED | OUTPATIENT
Start: 2022-07-08 | End: 2022-07-08

## 2022-07-08 RX ORDER — SODIUM CHLORIDE, SODIUM LACTATE, POTASSIUM CHLORIDE, CALCIUM CHLORIDE 600; 310; 30; 20 MG/100ML; MG/100ML; MG/100ML; MG/100ML
INJECTION, SOLUTION INTRAVENOUS CONTINUOUS
Status: DISCONTINUED | OUTPATIENT
Start: 2022-07-08 | End: 2022-07-09

## 2022-07-08 RX ORDER — DIPHENOXYLATE HYDROCHLORIDE AND ATROPINE SULFATE 2.5; .025 MG/1; MG/1
1 TABLET ORAL ONCE
Status: COMPLETED | OUTPATIENT
Start: 2022-07-08 | End: 2022-07-08

## 2022-07-08 RX ORDER — LORAZEPAM 0.5 MG/1
0.5 TABLET ORAL ONCE
Status: COMPLETED | OUTPATIENT
Start: 2022-07-08 | End: 2022-07-08

## 2022-07-08 RX ORDER — SODIUM CHLORIDE 0.9 % (FLUSH) 0.9 %
5-40 SYRINGE (ML) INJECTION PRN
Status: DISCONTINUED | OUTPATIENT
Start: 2022-07-08 | End: 2022-07-09

## 2022-07-08 RX ORDER — CARBOPROST TROMETHAMINE 250 UG/ML
INJECTION, SOLUTION INTRAMUSCULAR
Status: COMPLETED
Start: 2022-07-08 | End: 2022-07-08

## 2022-07-08 RX ORDER — SODIUM CHLORIDE 9 MG/ML
25 INJECTION, SOLUTION INTRAVENOUS PRN
Status: DISCONTINUED | OUTPATIENT
Start: 2022-07-08 | End: 2022-07-09

## 2022-07-08 RX ORDER — MODIFIED LANOLIN
OINTMENT (GRAM) TOPICAL PRN
Status: DISCONTINUED | OUTPATIENT
Start: 2022-07-08 | End: 2022-07-11 | Stop reason: HOSPADM

## 2022-07-08 RX ORDER — IBUPROFEN 800 MG/1
800 TABLET ORAL EVERY 8 HOURS SCHEDULED
Status: DISCONTINUED | OUTPATIENT
Start: 2022-07-08 | End: 2022-07-11 | Stop reason: HOSPADM

## 2022-07-08 RX ORDER — LIDOCAINE HYDROCHLORIDE AND EPINEPHRINE 15; 5 MG/ML; UG/ML
INJECTION, SOLUTION EPIDURAL PRN
Status: DISCONTINUED | OUTPATIENT
Start: 2022-07-08 | End: 2022-07-09 | Stop reason: SDUPTHER

## 2022-07-08 RX ORDER — MIDAZOLAM HYDROCHLORIDE 1 MG/ML
INJECTION INTRAMUSCULAR; INTRAVENOUS PRN
Status: DISCONTINUED | OUTPATIENT
Start: 2022-07-08 | End: 2022-07-09 | Stop reason: SDUPTHER

## 2022-07-08 RX ORDER — LIDOCAINE HYDROCHLORIDE 20 MG/ML
INJECTION, SOLUTION INFILTRATION; PERINEURAL
Status: DISCONTINUED
Start: 2022-07-08 | End: 2022-07-09

## 2022-07-08 RX ORDER — ONDANSETRON 2 MG/ML
4 INJECTION INTRAMUSCULAR; INTRAVENOUS EVERY 6 HOURS PRN
Status: DISCONTINUED | OUTPATIENT
Start: 2022-07-08 | End: 2022-07-09

## 2022-07-08 RX ORDER — CALCIUM CARBONATE 200(500)MG
1000 TABLET,CHEWABLE ORAL 3 TIMES DAILY PRN
Status: DISCONTINUED | OUTPATIENT
Start: 2022-07-08 | End: 2022-07-11 | Stop reason: HOSPADM

## 2022-07-08 RX ORDER — SODIUM CHLORIDE 9 MG/ML
INJECTION, SOLUTION INTRAVENOUS
Status: DISCONTINUED
Start: 2022-07-08 | End: 2022-07-09

## 2022-07-08 RX ORDER — SODIUM CHLORIDE 0.9 % (FLUSH) 0.9 %
5-40 SYRINGE (ML) INJECTION EVERY 12 HOURS SCHEDULED
Status: DISCONTINUED | OUTPATIENT
Start: 2022-07-08 | End: 2022-07-09

## 2022-07-08 RX ORDER — ACETAMINOPHEN 325 MG/1
650 TABLET ORAL EVERY 4 HOURS PRN
Status: DISCONTINUED | OUTPATIENT
Start: 2022-07-08 | End: 2022-07-11 | Stop reason: HOSPADM

## 2022-07-08 RX ORDER — OXYTOCIN 10 [USP'U]/ML
INJECTION, SOLUTION INTRAMUSCULAR; INTRAVENOUS
Status: DISCONTINUED
Start: 2022-07-08 | End: 2022-07-09

## 2022-07-08 RX ORDER — TRANEXAMIC ACID 100 MG/ML
15 INJECTION, SOLUTION INTRAVENOUS ONCE
Status: COMPLETED | OUTPATIENT
Start: 2022-07-08 | End: 2022-07-08

## 2022-07-08 RX ORDER — FENTANYL CITRATE 50 UG/ML
INJECTION, SOLUTION INTRAMUSCULAR; INTRAVENOUS PRN
Status: DISCONTINUED | OUTPATIENT
Start: 2022-07-08 | End: 2022-07-09 | Stop reason: SDUPTHER

## 2022-07-08 RX ORDER — BUPIVACAINE HYDROCHLORIDE 2.5 MG/ML
INJECTION, SOLUTION EPIDURAL; INFILTRATION; INTRACAUDAL PRN
Status: DISCONTINUED | OUTPATIENT
Start: 2022-07-08 | End: 2022-07-09 | Stop reason: SDUPTHER

## 2022-07-08 RX ORDER — TRANEXAMIC ACID 100 MG/ML
INJECTION, SOLUTION INTRAVENOUS
Status: COMPLETED
Start: 2022-07-08 | End: 2022-07-08

## 2022-07-08 RX ORDER — DIPHENHYDRAMINE HCL 25 MG
25 TABLET ORAL EVERY 4 HOURS PRN
Status: DISCONTINUED | OUTPATIENT
Start: 2022-07-08 | End: 2022-07-09

## 2022-07-08 RX ORDER — ONDANSETRON 4 MG/1
4 TABLET, ORALLY DISINTEGRATING ORAL EVERY 4 HOURS PRN
Status: DISCONTINUED | OUTPATIENT
Start: 2022-07-08 | End: 2022-07-11 | Stop reason: HOSPADM

## 2022-07-08 RX ORDER — NALBUPHINE HCL 10 MG/ML
5 AMPUL (ML) INJECTION
Status: COMPLETED | OUTPATIENT
Start: 2022-07-08 | End: 2022-07-08

## 2022-07-08 RX ORDER — DOCUSATE SODIUM 100 MG/1
100 CAPSULE, LIQUID FILLED ORAL 2 TIMES DAILY PRN
Status: DISCONTINUED | OUTPATIENT
Start: 2022-07-08 | End: 2022-07-09

## 2022-07-08 RX ORDER — PENICILLIN G 3000000 [IU]/50ML
3 INJECTION, SOLUTION INTRAVENOUS EVERY 4 HOURS
Status: DISCONTINUED | OUTPATIENT
Start: 2022-07-08 | End: 2022-07-09

## 2022-07-08 RX ORDER — SODIUM CHLORIDE 9 MG/ML
INJECTION, SOLUTION INTRAVENOUS
Status: DISCONTINUED
Start: 2022-07-08 | End: 2022-07-08 | Stop reason: WASHOUT

## 2022-07-08 RX ORDER — OXYTOCIN 10 [USP'U]/ML
INJECTION, SOLUTION INTRAMUSCULAR; INTRAVENOUS
Status: DISCONTINUED
Start: 2022-07-08 | End: 2022-07-08 | Stop reason: WASHOUT

## 2022-07-08 RX ORDER — MISOPROSTOL 200 UG/1
TABLET ORAL
Status: COMPLETED
Start: 2022-07-08 | End: 2022-07-08

## 2022-07-08 RX ORDER — ONDANSETRON 2 MG/ML
4 INJECTION INTRAMUSCULAR; INTRAVENOUS EVERY 4 HOURS PRN
Status: DISCONTINUED | OUTPATIENT
Start: 2022-07-08 | End: 2022-07-11 | Stop reason: HOSPADM

## 2022-07-08 RX ORDER — ACETAMINOPHEN 500 MG
1000 TABLET ORAL ONCE
Status: COMPLETED | OUTPATIENT
Start: 2022-07-08 | End: 2022-07-09

## 2022-07-08 RX ADMIN — SODIUM CHLORIDE, POTASSIUM CHLORIDE, SODIUM LACTATE AND CALCIUM CHLORIDE 999 ML: 600; 310; 30; 20 INJECTION, SOLUTION INTRAVENOUS at 19:26

## 2022-07-08 RX ADMIN — DIPHENOXYLATE HYDROCHLORIDE AND ATROPINE SULFATE 1 TABLET: 2.5; .025 TABLET ORAL at 21:31

## 2022-07-08 RX ADMIN — Medication 5 ML: at 05:17

## 2022-07-08 RX ADMIN — METHYLERGONOVINE MALEATE: 0.2 INJECTION INTRAVENOUS at 20:30

## 2022-07-08 RX ADMIN — CARBOPROST TROMETHAMINE: 250 INJECTION, SOLUTION INTRAMUSCULAR at 20:40

## 2022-07-08 RX ADMIN — Medication 12 ML/HR: at 16:17

## 2022-07-08 RX ADMIN — MISOPROSTOL 800 MCG: 200 TABLET ORAL at 20:15

## 2022-07-08 RX ADMIN — TRANEXAMIC ACID 1000 MG: 1 INJECTION, SOLUTION INTRAVENOUS at 20:41

## 2022-07-08 RX ADMIN — LIDOCAINE HYDROCHLORIDE AND EPINEPHRINE 3 ML: 15; 5 INJECTION, SOLUTION EPIDURAL at 05:07

## 2022-07-08 RX ADMIN — Medication 12 ML/HR: at 05:18

## 2022-07-08 RX ADMIN — TRANEXAMIC ACID 1000 MG: 1 INJECTION, SOLUTION INTRAVENOUS at 20:43

## 2022-07-08 RX ADMIN — Medication 1 MILLI-UNITS/MIN: at 09:26

## 2022-07-08 RX ADMIN — TRANEXAMIC ACID 1000 MG: 100 INJECTION, SOLUTION INTRAVENOUS at 20:41

## 2022-07-08 RX ADMIN — BUPIVACAINE HYDROCHLORIDE 5 ML: 2.5 INJECTION, SOLUTION EPIDURAL; INFILTRATION; INTRACAUDAL; PERINEURAL at 20:41

## 2022-07-08 RX ADMIN — BUPIVACAINE HYDROCHLORIDE 4 ML: 2.5 INJECTION, SOLUTION EPIDURAL; INFILTRATION; INTRACAUDAL; PERINEURAL at 12:30

## 2022-07-08 RX ADMIN — SODIUM CHLORIDE, POTASSIUM CHLORIDE, SODIUM LACTATE AND CALCIUM CHLORIDE: 600; 310; 30; 20 INJECTION, SOLUTION INTRAVENOUS at 05:11

## 2022-07-08 RX ADMIN — LORAZEPAM 0.5 MG: 0.5 TABLET ORAL at 06:57

## 2022-07-08 RX ADMIN — FENTANYL CITRATE 100 MCG: 50 INJECTION, SOLUTION INTRAMUSCULAR; INTRAVENOUS at 12:30

## 2022-07-08 RX ADMIN — TRANEXAMIC ACID 1000 MG: 100 INJECTION, SOLUTION INTRAVENOUS at 21:05

## 2022-07-08 RX ADMIN — NALBUPHINE HYDROCHLORIDE 10 MG: 10 INJECTION, SOLUTION INTRAMUSCULAR; INTRAVENOUS; SUBCUTANEOUS at 20:25

## 2022-07-08 RX ADMIN — SODIUM CHLORIDE, POTASSIUM CHLORIDE, SODIUM LACTATE AND CALCIUM CHLORIDE: 600; 310; 30; 20 INJECTION, SOLUTION INTRAVENOUS at 13:19

## 2022-07-08 RX ADMIN — MIDAZOLAM HYDROCHLORIDE 2 MG: 1 INJECTION, SOLUTION INTRAMUSCULAR; INTRAVENOUS at 21:00

## 2022-07-08 RX ADMIN — ONDANSETRON 4 MG: 2 INJECTION INTRAMUSCULAR; INTRAVENOUS at 21:15

## 2022-07-08 RX ADMIN — NALBUPHINE HYDROCHLORIDE 5 MG: 10 INJECTION, SOLUTION INTRAMUSCULAR; INTRAVENOUS; SUBCUTANEOUS at 03:25

## 2022-07-08 RX ADMIN — ONDANSETRON 4 MG: 2 INJECTION INTRAMUSCULAR; INTRAVENOUS at 11:09

## 2022-07-08 RX ADMIN — SODIUM CHLORIDE, POTASSIUM CHLORIDE, SODIUM LACTATE AND CALCIUM CHLORIDE: 600; 310; 30; 20 INJECTION, SOLUTION INTRAVENOUS at 04:00

## 2022-07-08 RX ADMIN — TRANEXAMIC ACID 1000 MG: 1 INJECTION, SOLUTION INTRAVENOUS at 21:05

## 2022-07-08 RX ADMIN — Medication 5 ML: at 05:12

## 2022-07-08 ASSESSMENT — PAIN SCALES - GENERAL: PAINLEVEL_OUTOF10: 6

## 2022-07-08 ASSESSMENT — ENCOUNTER SYMPTOMS
SHORTNESS OF BREATH: 0
VOMITING: 0
NAUSEA: 0

## 2022-07-08 NOTE — PLAN OF CARE
Problem: Pain  Goal: Verbalizes/displays adequate comfort level or baseline comfort level  Outcome: Progressing  Flowsheets (Taken 2022 013)  Verbalizes/displays adequate comfort level or baseline comfort level: Encourage patient to monitor pain and request assistance     Problem: Vaginal Birth or  Section  Goal: Fetal and maternal status remain reassuring during the birth process  Description:  Birth OB-Pregnancy care plan goal which identifies if the fetal and maternal status remain reassuring during the birth process  Outcome: Progressing     Problem: Infection - Adult  Goal: Absence of infection at discharge  Outcome: Progressing  Goal: Absence of infection during hospitalization  Outcome: Progressing  Goal: Absence of fever/infection during anticipated neutropenic period  Outcome: Progressing     Problem: Safety - Adult  Goal: Free from fall injury  Outcome: Progressing     Problem: Discharge Planning  Goal: Discharge to home or other facility with appropriate resources  Outcome: Progressing

## 2022-07-08 NOTE — FLOWSHEET NOTE
2266: Patient sitting  0449: CRNA bedside  0503: Test dose  0507:  Bolus dose  0509: Patient laying  0514: bolus dose #2

## 2022-07-08 NOTE — FLOWSHEET NOTE
RN called Thalia White to see if patient could get something for anxiety. Thalia White said 0.5 PO ativan if CRNA is okay with patient receiving it. Confirmed with CRNA that patient can have it.

## 2022-07-08 NOTE — PLAN OF CARE
Problem: Pain  Goal: Verbalizes/displays adequate comfort level or baseline comfort level  2022 by Seymour King RN  Outcome: Progressing  2022 014 by Panchito Alvarado RN  Outcome: Progressing  Flowsheets (Taken 2022 0130)  Verbalizes/displays adequate comfort level or baseline comfort level: Encourage patient to monitor pain and request assistance     Problem: Vaginal Birth or  Section  Goal: Fetal and maternal status remain reassuring during the birth process  Description:  Birth OB-Pregnancy care plan goal which identifies if the fetal and maternal status remain reassuring during the birth process  2022 0436 by Seymour King RN  Outcome: Progressing  2022 014 by Panchito Alvarado RN  Outcome: Progressing     Problem: Infection - Adult  Goal: Absence of infection at discharge  2022 by Seymour King RN  Outcome: Progressing  2022 by Panchito Alvarado RN  Outcome: Progressing  Goal: Absence of infection during hospitalization  2022 by Seymour King RN  Outcome: Progressing  2022 014 by Panchito Alvarado RN  Outcome: Progressing  Goal: Absence of fever/infection during anticipated neutropenic period  2022 by Seymour King RN  Outcome: Progressing  2022 by Panchito Alvarado RN  Outcome: Progressing     Problem: Safety - Adult  Goal: Free from fall injury  2022 by Seymour King RN  Outcome: Progressing  2022 by Panchito Alvarado RN  Outcome: Progressing     Problem: Discharge Planning  Goal: Discharge to home or other facility with appropriate resources  2022 by Seymour King RN  Outcome: Progressing  2022 by Panchito Alvarado RN  Outcome: Progressing

## 2022-07-08 NOTE — ANESTHESIA PROCEDURE NOTES
Epidural Block    Patient location during procedure: OB  Start time: 7/8/2022 4:50 AM  End time: 7/8/2022 5:20 AM  Reason for block: labor epidural  Staffing  Performed: resident/CRNA   Resident/CRNA: DENISE Garzon CRNA  Epidural  Patient position: sitting  Prep: Betadine  Patient monitoring: continuous pulse ox and frequent blood pressure checks  Approach: midline  Location: L4-5  Injection technique: GIUSEPPE saline  Provider prep: mask and sterile gloves  Needle  Needle type: Tuohy   Needle gauge: 17 G  Needle length: 3.5 in  Needle insertion depth: 7 cm  Catheter type: end hole  Catheter size: 20 G  Catheter at skin depth: 15 cm  Test dose: negativeCatheter Secured: tegaderm and tape  Assessment  Sensory level: T10  Hemodynamics: stable  Attempts: 1  Outcomes: patient tolerated procedure well  Preanesthetic Checklist  Completed: patient identified, IV checked, site marked, risks and benefits discussed, surgical/procedural consents, equipment checked, pre-op evaluation, timeout performed, anesthesia consent given, oxygen available, monitors applied/VS acknowledged, fire risk safety assessment completed and verbalized and blood product R/B/A discussed and consented

## 2022-07-08 NOTE — ANESTHESIA PRE PROCEDURE
Department of Anesthesiology  Preprocedure Note       Name:  Shiloh Ennis   Age:  23 y.o.  :  2000                                          MRN:  [id number]         Date:  2022      Surgeon: * No surgeons listed *    Procedure: * No procedures listed *    Medications prior to admission:   Prior to Admission medications    Medication Sig Start Date End Date Taking? Authorizing Provider   fluconazole (DIFLUCAN) 150 MG tablet Take 1 tablet every 3 days for 3 doses. For example: Take on Monday, Thursday, .  7/7/22 10/5/22  DENISE Hanna CNM   metroNIDAZOLE (FLAGYL) 500 MG tablet Take 1 tablet by mouth 2 times daily 7/7/22 10/5/22  DENISE Hanna - ROCKY   valACYclovir (VALTREX) 500 MG tablet Take 1 tablet by mouth 2 times daily 3/24/22 7/30/22  DENISE Hanna - ROCKY   ondansetron (ZOFRAN) 8 MG tablet Take 1 tablet by mouth every 8 hours as needed for Nausea or Vomiting  Patient not taking: Reported on 21   Mae Bradford DO   Prenatal Vit-Fe Fumarate-FA (PREPLUS) 27-1 MG TABS Take 1 tablet by mouth daily 21   DENISE Hanna CNM       Current medications:    Current Facility-Administered Medications   Medication Dose Route Frequency Provider Last Rate Last Admin    miSOPROStol (CYTOTEC) pre-split tablet TABS 25 mcg  25 mcg Oral every 2 hours DENISE Hanna CNM        oxytocin (PITOCIN) 30 units in 500 mL infusion  1 elif-units/min IntraVENous Continuous DENISE Hanna - ROCKY        lactated ringers infusion   IntraVENous Continuous DENISE Hanna - ROBERTM 125 mL/hr at 22 0400 New Bag at 22 0400    lactated ringers bolus  500 mL IntraVENous PRN DENISE Hanna - ROCKY        Or    lactated ringers bolus  1,000 mL IntraVENous PRN DENISE Hanna - ROCKY        sodium chloride flush 0.9 % injection 5-40 mL  5-40 mL IntraVENous 2 times per day DENISE Hanna - ROCKY        sodium chloride flush 0.9 % injection 5-40 mL  5-40 mL IntraVENous PRN Gabriela Peoples, APRN - CNM        0.9 % sodium chloride infusion  25 mL IntraVENous PRN Gabriela Jiangy, APRN - CNM        nalbuphine (NUBAIN) injection 5 mg  5 mg IntraMUSCular Once PRN Gabriela Apolinary, APRN - CNM        ondansetron (ZOFRAN) injection 4 mg  4 mg IntraVENous Q6H PRN Gabriela Jiangy, APRN - CNM        diphenhydrAMINE (BENADRYL) tablet 25 mg  25 mg Oral Q4H PRN Rogers Apolinary, APRN - CNM        oxytocin (PITOCIN) 30 units in 500 mL infusion  87.3 elif-units/min IntraVENous Continuous PRN Rogers Apolinary, APRN - CNM        And    oxytocin (PITOCIN) 10 unit bolus from the bag  10 Units IntraVENous PRN Gabriela Jiangy, APRN - CNM        acetaminophen (TYLENOL) tablet 650 mg  650 mg Oral Q4H PRN Kaykay Kruger, APRN - CNM        benzocaine-menthol (DERMOPLAST) 20-0.5 % spray   Topical PRN Gabriela Jiangy, APRN - CNM        docusate sodium (COLACE) capsule 100 mg  100 mg Oral BID PRN Gabriela Peoples, APRN - CNM        penicillin G potassium 5 Million Units in dextrose 5 % 100 mL IVPB (mini-bag)  5 Million Units IntraVENous Once Gabriela Peoples, APRN - CNM        Followed by   Hoang Ram penicillin G potassium IVPB 3 Million Units  3 Million Units IntraVENous Q4H Kaykay Kruger, APRN - CNM           Allergies:  No Known Allergies    Problem List:    Patient Active Problem List   Diagnosis Code    Anxiety F41.9    Mild episode of recurrent major depressive disorder ([facility] [address] F33.0    Marijuana use F12.90    Herpes simplex infection in mother during second trimester of pregnancy O98.512, B00.9    Admitted to labor and delivery Z78.9       Past Medical History:        Diagnosis Date    Anxiety 09/13/2017    Depression     History of herpes genitalis 12/1/2021       Past Surgical History:        Procedure Laterality Date    ANKLE SURGERY      flat foot    DENTAL SURGERY  2008    DENTAL EXTRACTIONS    FOOT SURGERY  2012    for flat feet    TONSILLECTOMY AND ADENOIDECTOMY N/A 2017    TONSILLECTOMY  AND ADENOIDECTOMY performed by [facility]sen [facility]clure [facility] at [facility] History:    Social History     Tobacco Use    Smoking status: Former Smoker     Types: Cigarettes     Quit date: 2021     Years since quittin.6    Smokeless tobacco: Never Used   Substance Use Topics    Alcohol use: Not Currently     Alcohol/week: 0.0 standard drinks     Comment: ETOH disorder; previous rehab/ tx; last drink 2021                                Counseling given: Not Answered      Vital Signs (Current):   Vitals:    22 0124 22 0126 22 0130   BP: 132/77 (!) 143/86    Pulse: 86 88    Resp: 18     Temp: 36.6 °C (97.9 °F)     TempSrc: Axillary     SpO2: 98%     Weight:   189 lb (85.7 kg)   Height:   5' 5\" (1.651 m)                                              BP Readings from Last 3 Encounters:   22 (!) 143/86   22 118/88   22 138/76       NPO Status:                                                                                 BMI:   Wt Readings from Last 3 Encounters:   22 189 lb (85.7 kg)   22 189 lb (85.7 kg)   22 187 lb 9.6 oz (85.1 kg)     Body mass index is 31.45 kg/m².     CBC:   Lab Results   Component Value Date/Time    WBC 14.6 2022 02:15 AM    RBC 3.99 2022 02:15 AM    RBC 4.51 2019 09:06 AM    HGB 10.8 2022 02:15 AM    HCT 33.7 2022 02:15 AM    MCV 84.5 2022 02:15 AM    RDW 15.0 2022 02:15 AM     2022 02:15 AM       CMP:   Lab Results   Component Value Date/Time     2022 02:15 AM    K 3.9 2022 02:15 AM    K 4.2 2021 08:15 PM     2022 02:15 AM    CO2 18 2022 02:15 AM    BUN 10 2022 02:15 AM    CREATININE 0.64 2022 02:15 AM    GFRAA >60.0 2022 02:15 AM    AGRATIO 1.6 2019 09:06 AM    LABGLOM >60.0 2022 02:15 AM    GLUCOSE 86 2022 02:15 AM GLUCOSE 107 02/06/2019 09:06 AM    PROT 6.7 07/08/2022 02:15 AM    CALCIUM 9.2 07/08/2022 02:15 AM    BILITOT 0.3 07/08/2022 02:15 AM    ALKPHOS 173 07/08/2022 02:15 AM    AST 16 07/08/2022 02:15 AM    ALT 6 07/08/2022 02:15 AM       POC Tests: No results for input(s): POCGLU, POCNA, POCK, POCCL, POCBUN, POCHEMO, POCHCT in the last 72 hours. Coags:   Lab Results   Component Value Date/Time    PROTIME 12.4 07/26/2021 08:15 PM    INR 0.9 07/26/2021 08:15 PM    APTT 32.2 07/26/2021 08:15 PM       HCG (If Applicable):   Lab Results   Component Value Date    PREGTESTUR Negative 07/09/2021        ABGs: No results found for: PHART, PO2ART, GXC4BDP, PCY9YRP, BEART, K5YHEFWN     Type & Screen (If Applicable):  No results found for: LABABO, LABRH    Drug/Infectious Status (If Applicable):  Lab Results   Component Value Date/Time    HEPCAB NONREACTIVE 12/04/2021 10:47 AM       COVID-19 Screening (If Applicable):   Lab Results   Component Value Date/Time    COVID19 Not Detected 07/08/2022 02:22 AM    COVID19 Not Detected 01/17/2021 01:24 AM           Anesthesia Evaluation  Patient summary reviewed no history of anesthetic complications:   Airway: Mallampati: II  TM distance: >3 FB   Neck ROM: full  Mouth opening: > = 3 FB   Dental: normal exam         Pulmonary:Negative Pulmonary ROS and normal exam                               Cardiovascular:Negative CV ROS                      Neuro/Psych:   (+) psychiatric history: stable without treatmentdepression/anxiety             GI/Hepatic/Renal:   (+) GERD: no interval change,           Endo/Other: Negative Endo/Other ROS                    Abdominal:             Vascular: negative vascular ROS. Other Findings: gravid          Anesthesia Plan      epidural     ASA 2             Anesthetic plan and risks discussed with patient. Use of blood products discussed with patient whom consented to blood products. Plan discussed with attending.                     YEE Taiwo Max, APRN - CRNA   7/8/2022

## 2022-07-08 NOTE — FLOWSHEET NOTE
This note also relates to the following rows which could not be included:  Heart Rate - Cannot attach notes to unvalidated device data  BP - Cannot attach notes to unvalidated device data

## 2022-07-08 NOTE — H&P
Department of Obstetrics and Gynecology  Labor and Delivery   Admission History and Physical      History of Present Illness:     Elaine Pederson is a 23 y.o. female  at 43w3d who presents following spontaneous rupture of membranes with painful contractions. Maternal Medical History:   Reason for admission: Rupture of membranes and contractions. Nausea. Contractions: Onset was yesterday. Frequency: regular. Perceived severity is strong. Fetal activity: Perceived fetal activity is normal.    Last perceived fetal movement was within the past hour. Prenatal complications: Substance abuse. History of depression and anxiety  History of herpes simplex    Prenatal Complications - Diabetes: none. Having difficulty managing anxiety symptoms, requesting medication to help her relax and rest.  Comfortable with epidural.    Past Medical, Surgical, and Family History: Allergies:  Patient has no known allergies. Patient's last menstrual period was 2021 (within days).   OB History    Para Term  AB Living   1 0 0 0 0 0   SAB IAB Ectopic Molar Multiple Live Births   0 0 0 0 0 0      # Outcome Date GA Lbr Ash/2nd Weight Sex Delivery Anes PTL Lv   1 Current                Past Medical History:   Diagnosis Date    Anxiety 2017    Depression     History of herpes genitalis 2021     Past Surgical History:   Procedure Laterality Date    ANKLE SURGERY      flat foot    DENTAL SURGERY      [facility] FOOT SURGERY      for flat feet    TONSILLECTOMY AND ADENOIDECTOMY N/A 2017    TONSILLECTOMY  AND ADENOIDECTOMY performed by [facility]cl[facility]     Family History   Problem Relation Age of Onset    Asthma Other         UNCLE WITH MILD ASTHMA    No Known Problems Mother     No Known Problems Father     No Known Problems Paternal Grandmother     Pacemaker Paternal Grandfather     Anxiety Disorder Maternal Grandmother for lesion. Normal vagina. Vagina is negative for ulcerations. Amniotic fluid character: clear. Pelvis: adequate for delivery. Cervix: Cervix evaluated by digital exam.        Fetal Exam  Fetal Monitor Review: Mode: ultrasound. Baseline rate: 130. Variability: moderate (6-25 bpm). Pattern: accelerations present and no decelerations.       Fetal State Assessment: Category I - tracings are normal.          Labs:  Group B Strep:  Negative     Blood Type/Rh:  O POS  Rubella:  Immune    Hepatitis C:  Negative (12/4/21)  HIV:  Negative  Hep B S Ag Interp   Date Value Ref Range Status   07/08/2022 Non-reactive  Final        General Labs:   Admission on 07/08/2022   Component Date Value Ref Range Status    Placenta Alpha Microglobulin-1 07/08/2022 POSITIVE* Negative Final    Color, UA 07/08/2022 Yellow  Straw/Yellow Final    Clarity, UA 07/08/2022 Clear  Clear Final    Glucose, Ur 07/08/2022 Negative  Negative mg/dL Final    Bilirubin Urine 07/08/2022 Negative  Negative Final    Ketones, Urine 07/08/2022 Negative  Negative mg/dL Final    Specific Gravity, UA 07/08/2022 1.008  1.005 - 1.030 Final    Blood, Urine 07/08/2022 TRACE* Negative Final    pH, UA 07/08/2022 7.0  5.0 - 9.0 Final    Protein, UA 07/08/2022 Negative  Negative mg/dL Final    Urobilinogen, Urine 07/08/2022 0.2  <2.0 E.U./dL Final    Nitrite, Urine 07/08/2022 Negative  Negative Final    Leukocyte Esterase, Urine 07/08/2022 SMALL* Negative Final    Urine Reflex to Culture 07/08/2022 Not Indicated   Final    Amphetamine Screen, Urine 07/08/2022 Neg  Negative <1000 ng/mL Final    Barbiturate Screen, Ur 07/08/2022 Neg  Negative < 200 ng/mL Final    Benzodiazepine Screen, Urine 07/08/2022 Neg  Negative < 200 ng/mL Final    Cannabinoid Scrn, Ur 07/08/2022 Neg  Negative < 50 ng/mL Final    Cocaine Metabolite Screen, Urine 07/08/2022 Neg  Negative < 300 ng/mL Final    Opiate Scrn, Ur 07/08/2022 Neg  Negative < 300 ng/mL Final  PCP Screen, Urine 07/08/2022 Neg  Negative < 25 ng/mL Final    Methadone Screen, Urine 07/08/2022 Neg  Negative <300 ng/mL Final    Propoxyphene Scrn, Ur 07/08/2022 Neg  Negative <300 ng/mL Final    Oxycodone Urine 07/08/2022 Neg  Negative <100 ng/mL Final    FENTANYL SCREEN, URINE 07/08/2022 Neg  Negative < 50 ng/mL Final    Drug Screen Comment: 07/08/2022 see below   Final    Comment: This method is a screening test to detect only these drug  classes as part of a medical workup. Confirmatory testing  by another method should be ordered if clinically indicated.       Bacteria, UA 07/08/2022 Negative  Negative /HPF Final    Hyaline Casts, UA 07/08/2022 0-1  0 - 5 /HPF Final    WBC, UA 07/08/2022 6-9* 0 - 5 /HPF Final    RBC, UA 07/08/2022 0-2  0 - 5 /HPF Final    Epithelial Cells, UA 07/08/2022 0-2  0 - 5 /HPF Final    WBC 07/08/2022 14.6* 4.5 - 11.0 K/uL Final    RBC 07/08/2022 3.99* 4.20 - 5.40 M/uL Final    Hemoglobin 07/08/2022 10.8* 12.0 - 16.0 g/dL Final    Hematocrit 07/08/2022 33.7* 37.0 - 47.0 % Final    MCV 07/08/2022 84.5  82.0 - 100.0 fL Final    MCH 07/08/2022 27.0  27.0 - 31.3 pg Final    MCHC 07/08/2022 32.0* 33.0 - 37.0 % Final    RDW 07/08/2022 15.0* 11.5 - 14.5 % Final    Platelets 15/78/7735 143  130 - 400 K/uL Final    Neutrophils % 07/08/2022 78.5  % Final    Lymphocytes % 07/08/2022 13.9  % Final    Monocytes % 07/08/2022 7.2  % Final    Eosinophils % 07/08/2022 0.2  % Final    Basophils % 07/08/2022 0.2  % Final    Neutrophils Absolute 07/08/2022 11.5* 1.4 - 6.5 K/uL Final    Lymphocytes Absolute 07/08/2022 2.0  1.0 - 4.8 K/uL Final    Monocytes Absolute 07/08/2022 1.1* 0.2 - 0.8 K/uL Final    Eosinophils Absolute 07/08/2022 0.0  0.0 - 0.7 K/uL Final    Basophils Absolute 07/08/2022 0.0  0.0 - 0.2 K/uL Final    Sodium 07/08/2022 136  135 - 144 mEq/L Final    Potassium 07/08/2022 3.9  3.4 - 4.9 mEq/L Final    Chloride 07/08/2022 103  95 - 107 mEq/L Final    CO2 07/08/2022 18* 20 - 31 mEq/L Final    Anion Gap 07/08/2022 15  9 - 15 mEq/L Final    Glucose 07/08/2022 86  70 - 99 mg/dL Final    BUN 07/08/2022 10  6 - 20 mg/dL Final    CREATININE 07/08/2022 0.64  0.50 - 0.90 mg/dL Final    GFR Non- 07/08/2022 >60.0  >60 Final    Comment: >60 mL/min/1.73m2 EGFR, calc. for ages 25 and older using the  MDRD formula (not corrected for weight), is valid for stable  renal function.  GFR  07/08/2022 >60.0  >60 Final    Comment: >60 mL/min/1.73m2 EGFR, calc. for ages 25 and older using the  MDRD formula (not corrected for weight), is valid for stable  renal function.  Calcium 07/08/2022 9.2  8.5 - 9.9 mg/dL Final    Total Protein 07/08/2022 6.7  6.3 - 8.0 g/dL Final    Albumin 07/08/2022 3.8  3.5 - 4.6 g/dL Final    Total Bilirubin 07/08/2022 0.3  0.2 - 0.7 mg/dL Final    Alkaline Phosphatase 07/08/2022 173* 40 - 130 U/L Final    ALT 07/08/2022 6  0 - 33 U/L Final    AST 07/08/2022 16  0 - 35 U/L Final    Globulin 07/08/2022 2.9  2.3 - 3.5 g/dL Final    Hep B S Ag Interp 07/08/2022 Non-reactive   Final    ABO/Rh 07/08/2022 O POS   Final    Antibody Screen 07/08/2022 NEG   Final    SARS-CoV-2, NAAT 07/08/2022 Not Detected  Not Detected Final    Comment: Rapid NAAT:   Negative results should be treated as presumptive and,  if inconsistent with clinical signs and symptoms or necessary for  patient management, should be tested with an alternative molecular  assay. Negative results do not preclude SARS-CoV-2 infection and  should not be used as the sole basis for patient management decisions. This test has been authorized by the FDA under an Emergency Use  Authorization (EUA) for use by authorized laboratories.     Fact sheet for Healthcare Providers:  BuildHer.es  Fact sheet for Patients: BuildHer.es    METHODOLOGY: Isothermal Nucleic Acid Amplification       Assessment & Plan:     23 y.o. female  IUP at 39w4d    Patient Active Problem List    Diagnosis Date Noted    Admitted to labor and delivery 2022     Priority: Medium    Herpes simplex infection in mother during second trimester of pregnancy 2022     3/24/22:  Suppressive therapy started      Marijuana use 2021    Mild episode of recurrent major depressive disorder ([facility] [address] 2021    Anxiety 2017       Assessment:  Membrane status: SROM. Fetal well-being: normal.   Bleeding source: bloody show. History of herpes simplex with no active lesions  Transitioning into early active labor  GBS negative  Comfortable with epidural  History of anxiety and depression, relieved with PRN Ativan    Plan:  Admit to L&D for SROM. Recheck cervix at approximately 1000, if no significant cervical change, begin Pitocin.   Anticipate a vaginal delivery

## 2022-07-09 LAB
BASOPHILS ABSOLUTE: 0 K/UL (ref 0–0.2)
BASOPHILS RELATIVE PERCENT: 0.1 %
EOSINOPHILS ABSOLUTE: 0 K/UL (ref 0–0.7)
EOSINOPHILS RELATIVE PERCENT: 0 %
HCT VFR BLD CALC: 26.7 % (ref 37–47)
HEMOGLOBIN: 8.7 G/DL (ref 12–16)
LYMPHOCYTES ABSOLUTE: 1.3 K/UL (ref 1–4.8)
LYMPHOCYTES RELATIVE PERCENT: 7.7 %
MCH RBC QN AUTO: 27.3 PG (ref 27–31.3)
MCHC RBC AUTO-ENTMCNC: 32.6 % (ref 33–37)
MCV RBC AUTO: 83.8 FL (ref 82–100)
MONOCYTES ABSOLUTE: 1.5 K/UL (ref 0.2–0.8)
MONOCYTES RELATIVE PERCENT: 8.8 %
NEUTROPHILS ABSOLUTE: 14.1 K/UL (ref 1.4–6.5)
NEUTROPHILS RELATIVE PERCENT: 83.4 %
PDW BLD-RTO: 15.3 % (ref 11.5–14.5)
PLATELET # BLD: 128 K/UL (ref 130–400)
RBC # BLD: 3.19 M/UL (ref 4.2–5.4)
WBC # BLD: 16.9 K/UL (ref 4.5–11)

## 2022-07-09 PROCEDURE — S9443 LACTATION CLASS: HCPCS

## 2022-07-09 PROCEDURE — 85025 COMPLETE CBC W/AUTO DIFF WBC: CPT

## 2022-07-09 PROCEDURE — 99024 POSTOP FOLLOW-UP VISIT: CPT | Performed by: ADVANCED PRACTICE MIDWIFE

## 2022-07-09 PROCEDURE — 1220000000 HC SEMI PRIVATE OB R&B

## 2022-07-09 PROCEDURE — 36415 COLL VENOUS BLD VENIPUNCTURE: CPT

## 2022-07-09 PROCEDURE — 6370000000 HC RX 637 (ALT 250 FOR IP): Performed by: ADVANCED PRACTICE MIDWIFE

## 2022-07-09 RX ORDER — NALBUPHINE HCL 10 MG/ML
10 AMPUL (ML) INJECTION
Status: COMPLETED | OUTPATIENT
Start: 2022-07-08 | End: 2022-07-08

## 2022-07-09 RX ADMIN — ACETAMINOPHEN 650 MG: 325 TABLET ORAL at 12:03

## 2022-07-09 RX ADMIN — ACETAMINOPHEN 1000 MG: 500 TABLET ORAL at 04:38

## 2022-07-09 RX ADMIN — ACETAMINOPHEN 650 MG: 325 TABLET ORAL at 17:58

## 2022-07-09 RX ADMIN — ACETAMINOPHEN 650 MG: 325 TABLET ORAL at 08:00

## 2022-07-09 RX ADMIN — IBUPROFEN 800 MG: 800 TABLET, FILM COATED ORAL at 23:34

## 2022-07-09 RX ADMIN — IBUPROFEN 800 MG: 800 TABLET, FILM COATED ORAL at 14:40

## 2022-07-09 ASSESSMENT — PAIN DESCRIPTION - DESCRIPTORS
DESCRIPTORS: ACHING;CRAMPING
DESCRIPTORS: SORE
DESCRIPTORS: DISCOMFORT
DESCRIPTORS: SORE

## 2022-07-09 ASSESSMENT — PAIN DESCRIPTION - LOCATION
LOCATION: ABDOMEN;BACK
LOCATION: BACK;ABDOMEN
LOCATION: ABDOMEN;BACK
LOCATION: ABDOMEN
LOCATION: ABDOMEN

## 2022-07-09 ASSESSMENT — PAIN SCALES - GENERAL
PAINLEVEL_OUTOF10: 2
PAINLEVEL_OUTOF10: 3
PAINLEVEL_OUTOF10: 2
PAINLEVEL_OUTOF10: 2

## 2022-07-09 ASSESSMENT — PAIN - FUNCTIONAL ASSESSMENT: PAIN_FUNCTIONAL_ASSESSMENT: ACTIVITIES ARE NOT PREVENTED

## 2022-07-09 ASSESSMENT — PAIN DESCRIPTION - ORIENTATION: ORIENTATION: LOWER

## 2022-07-09 NOTE — LACTATION NOTE
Mother breast feeding infant  Infant latched lower lip pulled inwards   Lip readjusted   Infant relatched rhythmic sucking  Informed mother about the supply and demand of breast feeding, intake and output of the   Mother states infant favoring the right breast encouraged mother to attempt different positions  Informed mother about our lactation warm line,  breast feeding group and [email or web address]  Encouraged mother to breast feed every 2-3 hours for 10-20 minutes per breast  Breast feeding folder given to pt    [facility] to room Mother wants to try football hold  Infant to left breast with football hold  Infant latched after a few attempts  Infant latched and sucking    1700  Mother just breast fed for 20 minutes on the left breast

## 2022-07-09 NOTE — L&D DELIVERY SUMMARY NOTE
Mayuri Soto is a 23 y.o.  at 43w3d. She was admitted to L&D 2022  1:09 AM     Her prenatal course was complicated by:  Patient Active Problem List    Diagnosis Date Noted    Admitted to labor and delivery 2022     Priority: Medium    Third-stage postpartum hemorrhage 2022     Priority: Medium    Herpes simplex infection in mother during second trimester of pregnancy 2022     3/24/22:  Suppressive therapy started      Marijuana use 2021    Mild episode of recurrent major depressive disorder ([facility] [address] 2021    Anxiety 2017       Induction Method:  None  Augmentation:  Pitocin  Pain Management: IV Sedation and Epidural  Group B Strep:    Group B Strep Culture   Date Value Ref Range Status   2022   Final    Rule Out Grp. B Strep:  NEGATIVE FOR GROUP B STREPTOCOCCI  Performed at [facility] [address] [facility], [address] [place] [address]  [phone]         A vigorous Female infant was spontaneously delivered and placed to mother's chest.  Cord clamping was delayed until no longer pulsating.  APGAR's:  8, 9   Nuchal Cord:  None  Weight:   Information for the patient's :  Bharti Junior, [facility] [[id number]]   Birth Weight: 8 lb 13.4 oz (4.009 kg)       Placenta:  Orlin Aus, intact. Episiotomy:  Not Performed  Perineum:  Second Degree Perineal Laceration  Other Laceration(s):  None    Uterus boggy, slow to firm with bi-manual massage, unable to maintain tone when massage was discontinued. Cytotec 800mcg was administered GA and uterus became firm, bleeding scant. Uterus again became boggy, bleeding moderate with clots. Patient experiencing significant pain with fundal massage, anesthesia was called and requested to assist with postpartum pain management. Unable to maintain firm uterine tone, Metherine 0.2mg was administered IM and an OBERRT was activated. Bleeding again became moderate with clots, uterus unable to maintain firm tone. Hemabate IM and TXA were initiated and Dr. Reba Rice was called to the bedside for physician evaluation. A second dose of TXA was administered. Following Hemabate and TXA, uterus remained firm, bleeding small-scant. Vaginal sweep performed, sponge and instrument counts were verified upon procedure completion. Fundus remains firm, below umbilicus, bleeding is scant to small.        Complications:  Postpartum Hemorrhage  Estimated Blood Loss:  1000 ml    Placenta:  Discarded    Electronically signed by DENISE Chow CNM on 7/8/22 at 9:13 PM EDT

## 2022-07-09 NOTE — FLOWSHEET NOTE
Called Kaykay HIDALGO to update on patient bleeding, Firm at U but continues to trickle. Per Alden Bonilla run second bag of Pitocin at 125. Informed of Pitocin normally run at 87.3 with LR run at 37.7. Per Alden Bonilla ok to run Pitocin at 125mL, no LR.

## 2022-07-09 NOTE — PROGRESS NOTES
Department of Obstetrics and Gynecology  Labor and Delivery   Vaginal Delivery Post-Partum Progress Note      Subjective:     Lena Simmons is a 23 y.o. female  at 43w3d    Postpartum Day 1: Vaginal Delivery on 22 with a baby girl. The patient feels tired. She denies emotional concerns, her pain is well controlled with current medications. She is ambulating well and is tolerating a normal diet. The baby is well and feeding well. Discussed timing of discharge would be closer to 48 hours postpartum to accommodate baby monitoring due to prolonged rupture of membranes.     Objective:      Patient Vitals for the past 4 hrs:   BP Temp Temp src Pulse Resp   22 1444 119/72 98.2 °F (36.8 °C) Oral (!) 117 16       General:    alert, cooperative and no distress   Bowel Sounds:  active   Lochia:  appropriate   Uterine Fundus:   firm, with appropriate involution   Perineum: Episiotomy:  None  Laceration:  Second degree perineal laceration   DVT Evaluation:  No evidence of DVT seen on physical exam.     Labs:     Blood Type/Rh:  O POS     Rubella:  Immune     Group B Strep:  negative       Admission on 2022   Component Date Value Ref Range Status    Placenta Alpha Microglobulin-1 2022 POSITIVE* Negative Final    Color, UA 2022 Yellow  Straw/Yellow Final    Clarity, UA 2022 Clear  Clear Final    Glucose, Ur 2022 Negative  Negative mg/dL Final    Bilirubin Urine 2022 Negative  Negative Final    Ketones, Urine 2022 Negative  Negative mg/dL Final    Specific Gravity, UA 2022 1.008  1.005 - 1.030 Final    Blood, Urine 2022 TRACE* Negative Final    pH, UA 2022 7.0  5.0 - 9.0 Final    Protein, UA 2022 Negative  Negative mg/dL Final    Urobilinogen, Urine 2022 0.2  <2.0 E.U./dL Final    Nitrite, Urine 2022 Negative  Negative Final    Leukocyte Esterase, Urine 2022 SMALL* Negative Final    Urine Reflex to Culture 07/08/2022 Not Indicated   Final    Amphetamine Screen, Urine 07/08/2022 Neg  Negative <1000 ng/mL Final    Barbiturate Screen, Ur 07/08/2022 Neg  Negative < 200 ng/mL Final    Benzodiazepine Screen, Urine 07/08/2022 Neg  Negative < 200 ng/mL Final    Cannabinoid Scrn, Ur 07/08/2022 Neg  Negative < 50 ng/mL Final    Cocaine Metabolite Screen, Urine 07/08/2022 Neg  Negative < 300 ng/mL Final    Opiate Scrn, Ur 07/08/2022 Neg  Negative < 300 ng/mL Final    PCP Screen, Urine 07/08/2022 Neg  Negative < 25 ng/mL Final    Methadone Screen, Urine 07/08/2022 Neg  Negative <300 ng/mL Final    Propoxyphene Scrn, Ur 07/08/2022 Neg  Negative <300 ng/mL Final    Oxycodone Urine 07/08/2022 Neg  Negative <100 ng/mL Final    FENTANYL SCREEN, URINE 07/08/2022 Neg  Negative < 50 ng/mL Final    Drug Screen Comment: 07/08/2022 see below   Final    Bacteria, UA 07/08/2022 Negative  Negative /HPF Final    Hyaline Casts, UA 07/08/2022 0-1  0 - 5 /HPF Final    WBC, UA 07/08/2022 6-9* 0 - 5 /HPF Final    RBC, UA 07/08/2022 0-2  0 - 5 /HPF Final    Epithelial Cells, UA 07/08/2022 0-2  0 - 5 /HPF Final    WBC 07/08/2022 14.6* 4.5 - 11.0 K/uL Final    RBC 07/08/2022 3.99* 4.20 - 5.40 M/uL Final    Hemoglobin 07/08/2022 10.8* 12.0 - 16.0 g/dL Final    Hematocrit 07/08/2022 33.7* 37.0 - 47.0 % Final    MCV 07/08/2022 84.5  82.0 - 100.0 fL Final    MCH 07/08/2022 27.0  27.0 - 31.3 pg Final    MCHC 07/08/2022 32.0* 33.0 - 37.0 % Final    RDW 07/08/2022 15.0* 11.5 - 14.5 % Final    Platelets 63/83/4538 143  130 - 400 K/uL Final    Neutrophils % 07/08/2022 78.5  % Final    Lymphocytes % 07/08/2022 13.9  % Final    Monocytes % 07/08/2022 7.2  % Final    Eosinophils % 07/08/2022 0.2  % Final    Basophils % 07/08/2022 0.2  % Final    Neutrophils Absolute 07/08/2022 11.5* 1.4 - 6.5 K/uL Final    Lymphocytes Absolute 07/08/2022 2.0  1.0 - 4.8 K/uL Final    Monocytes Absolute 07/08/2022 1.1* 0.2 - 0.8 K/uL Final  Eosinophils Absolute 07/08/2022 0.0  0.0 - 0.7 K/uL Final    Basophils Absolute 07/08/2022 0.0  0.0 - 0.2 K/uL Final    Sodium 07/08/2022 136  135 - 144 mEq/L Final    Potassium 07/08/2022 3.9  3.4 - 4.9 mEq/L Final    Chloride 07/08/2022 103  95 - 107 mEq/L Final    CO2 07/08/2022 18* 20 - 31 mEq/L Final    Anion Gap 07/08/2022 15  9 - 15 mEq/L Final    Glucose 07/08/2022 86  70 - 99 mg/dL Final    BUN 07/08/2022 10  6 - 20 mg/dL Final    CREATININE 07/08/2022 0.64  0.50 - 0.90 mg/dL Final    GFR Non- 07/08/2022 >60.0  >60 Final    GFR  07/08/2022 >60.0  >60 Final    Calcium 07/08/2022 9.2  8.5 - 9.9 mg/dL Final    Total Protein 07/08/2022 6.7  6.3 - 8.0 g/dL Final    Albumin 07/08/2022 3.8  3.5 - 4.6 g/dL Final    Total Bilirubin 07/08/2022 0.3  0.2 - 0.7 mg/dL Final    Alkaline Phosphatase 07/08/2022 173* 40 - 130 U/L Final    ALT 07/08/2022 6  0 - 33 U/L Final    AST 07/08/2022 16  0 - 35 U/L Final    Globulin 07/08/2022 2.9  2.3 - 3.5 g/dL Final    Hep B S Ag Interp 07/08/2022 Non-reactive   Final    RPR 07/08/2022 Non-reactive  Non-reactive Final    ABO/Rh 07/08/2022 O POS   Final    Antibody Screen 07/08/2022 NEG   Final    SARS-CoV-2, NAAT 07/08/2022 Not Detected  Not Detected Final    WBC 07/09/2022 16.9* 4.5 - 11.0 K/uL Final    RBC 07/09/2022 3.19* 4.20 - 5.40 M/uL Final    Hemoglobin 07/09/2022 8.7* 12.0 - 16.0 g/dL Final    Hematocrit 07/09/2022 26.7* 37.0 - 47.0 % Final    MCV 07/09/2022 83.8  82.0 - 100.0 fL Final    MCH 07/09/2022 27.3  27.0 - 31.3 pg Final    MCHC 07/09/2022 32.6* 33.0 - 37.0 % Final    RDW 07/09/2022 15.3* 11.5 - 14.5 % Final    Platelets 73/58/0429 128* 130 - 400 K/uL Final    Neutrophils % 07/09/2022 83.4  % Final    Lymphocytes % 07/09/2022 7.7  % Final    Monocytes % 07/09/2022 8.8  % Final    Eosinophils % 07/09/2022 0.0  % Final    Basophils % 07/09/2022 0.1  % Final    Neutrophils Absolute 07/09/2022 14.1* 1.4 - 6.5 K/uL Final    Lymphocytes Absolute 07/09/2022 1.3  1.0 - 4.8 K/uL Final    Monocytes Absolute 07/09/2022 1.5* 0.2 - 0.8 K/uL Final    Eosinophils Absolute 07/09/2022 0.0  0.0 - 0.7 K/uL Final    Basophils Absolute 07/09/2022 0.0  0.0 - 0.2 K/uL Final       Assessment:     Status post vaginal delivery  Prolonged rupture of membranes  Postpartum hemnorrhage  Doing well      Plan:     Anticipate discharge on 7/10/22 with baby.

## 2022-07-10 VITALS
BODY MASS INDEX: 31.49 KG/M2 | WEIGHT: 189 LBS | RESPIRATION RATE: 18 BRPM | OXYGEN SATURATION: 100 % | SYSTOLIC BLOOD PRESSURE: 115 MMHG | TEMPERATURE: 98.2 F | HEIGHT: 65 IN | HEART RATE: 109 BPM | DIASTOLIC BLOOD PRESSURE: 71 MMHG

## 2022-07-10 PROBLEM — O42.90 FETAL MEMBRANES, PROLONGED RUPTURE: Status: ACTIVE | Noted: 2022-07-10

## 2022-07-10 PROCEDURE — 6370000000 HC RX 637 (ALT 250 FOR IP): Performed by: ADVANCED PRACTICE MIDWIFE

## 2022-07-10 PROCEDURE — 99024 POSTOP FOLLOW-UP VISIT: CPT | Performed by: ADVANCED PRACTICE MIDWIFE

## 2022-07-10 PROCEDURE — 1220000000 HC SEMI PRIVATE OB R&B

## 2022-07-10 RX ORDER — VALACYCLOVIR HYDROCHLORIDE 500 MG/1
500 TABLET, FILM COATED ORAL DAILY
Qty: 90 TABLET | Refills: 3 | Status: SHIPPED | OUTPATIENT
Start: 2022-07-10 | End: 2023-07-10

## 2022-07-10 RX ORDER — IBUPROFEN 800 MG/1
800 TABLET ORAL EVERY 8 HOURS PRN
Qty: 60 TABLET | Refills: 1 | Status: SHIPPED | OUTPATIENT
Start: 2022-07-10 | End: 2022-09-15 | Stop reason: SDUPTHER

## 2022-07-10 RX ADMIN — Medication: at 10:35

## 2022-07-10 RX ADMIN — IBUPROFEN 800 MG: 800 TABLET, FILM COATED ORAL at 06:30

## 2022-07-10 RX ADMIN — IBUPROFEN 800 MG: 800 TABLET, FILM COATED ORAL at 16:46

## 2022-07-10 RX ADMIN — IBUPROFEN 800 MG: 800 TABLET, FILM COATED ORAL at 22:15

## 2022-07-10 ASSESSMENT — PAIN SCALES - GENERAL
PAINLEVEL_OUTOF10: 2
PAINLEVEL_OUTOF10: 1
PAINLEVEL_OUTOF10: 3

## 2022-07-10 ASSESSMENT — PAIN DESCRIPTION - DESCRIPTORS: DESCRIPTORS: ACHING

## 2022-07-10 ASSESSMENT — PAIN DESCRIPTION - LOCATION: LOCATION: ABDOMEN;BACK

## 2022-07-10 NOTE — PLAN OF CARE
Problem: Pain  Goal: Verbalizes/displays adequate comfort level or baseline comfort level  Outcome: Progressing     Problem: Vaginal Birth or  Section  Goal: Fetal and maternal status remain reassuring during the birth process  Description:  Birth OB-Pregnancy care plan goal which identifies if the fetal and maternal status remain reassuring during the birth process  Outcome: Progressing     Problem: Infection - Adult  Goal: Absence of infection at discharge  Outcome: Progressing  Goal: Absence of infection during hospitalization  Outcome: Progressing  Goal: Absence of fever/infection during anticipated neutropenic period  Outcome: Progressing     Problem: Safety - Adult  Goal: Free from fall injury  Outcome: Progressing     Problem: Discharge Planning  Goal: Discharge to home or other facility with appropriate resources  Outcome: Progressing

## 2022-07-10 NOTE — PLAN OF CARE
Problem: Pain  Goal: Verbalizes/displays adequate comfort level or baseline comfort level  7/10/2022 1710 by Pedro Murillo RN  Outcome: Completed  7/10/2022 1144 by Pedro Murillo RN  Outcome: Progressing     Problem: Vaginal Birth or  Section  Goal: Fetal and maternal status remain reassuring during the birth process  Description:  Birth OB-Pregnancy care plan goal which identifies if the fetal and maternal status remain reassuring during the birth process  7/10/2022 1710 by Pedro Murillo RN  Outcome: Completed  7/10/2022 1144 by Pedro Murillo RN  Outcome: Progressing     Problem: Infection - Adult  Goal: Absence of infection at discharge  7/10/2022 1710 by Pedro Murillo RN  Outcome: Completed  7/10/2022 1144 by Pedro Murillo RN  Outcome: Progressing  Goal: Absence of infection during hospitalization  7/10/2022 1710 by Pedro Murillo RN  Outcome: Completed  7/10/2022 1144 by Pedro Murillo RN  Outcome: Progressing  Goal: Absence of fever/infection during anticipated neutropenic period  7/10/2022 1710 by Pedro Murillo RN  Outcome: Completed  7/10/2022 1144 by Pedro Murillo RN  Outcome: Progressing     Problem: Safety - Adult  Goal: Free from fall injury  7/10/2022 1710 by Pedro Murillo RN  Outcome: Completed  7/10/2022 1144 by Pedro Murillo RN  Outcome: Progressing     Problem: Discharge Planning  Goal: Discharge to home or other facility with appropriate resources  7/10/2022 1710 by Pedro Murillo RN  Outcome: Completed  7/10/2022 1144 by Pedro Murillo RN  Outcome: Progressing

## 2022-07-10 NOTE — CARE COORDINATION
LSW meet with pt at bedside with family. Reason for visit: pt was positive for [facility] 12/1/2021. Pt report having everything at home for the baby, diapers, clothing, crib, formula and car seat. Pt lives at home with her boyfriend and boyfriend's grand parents. Address: [address] [address]. Contact number: [phone]. Baby girl name: Hebert Lancaster. FOB: Karine Reynoso. Pt report no legal issues going on currently and no hx with CPS. Pt repot there is many family around to help out with the baby when need it. Pt is connected with [place]. List of resources left with pt at bedside. Pt report reason for use back in December is to help with keeping food down and help with nausea. That was the last time of use. Pt is been appropriate with baby. Pt can go home with baby at the time of DC. LSW will follow for any questions or concerns.      Electronically signed by BRIE Interiano on 7/10/2022 at 1:43 PM

## 2022-07-10 NOTE — DISCHARGE SUMMARY
Department of Obstetrics and Gynecology  Labor and Delivery   Vaginal Delivery Post-Partum Discharge Summary      Subjective:     Inderjit Rene is a 23 y.o. female  at 43w3d    Postpartum Day 2: Vaginal Delivery on 22 with a baby girl. The patient feels well. She denies emotional concerns, her pain is well controlled with current medications. She is ambulating well and is tolerating a normal diet. The baby is currently receiving phototherapy and both breast and bottle feeding well.     Objective:      Patient Vitals for the past 12 hrs:   BP Temp Temp src Pulse Resp   22 2339 123/64 98.4 °F (36.9 °C) Oral 95 18       General:    alert, cooperative and no distress   Bowel Sounds:  active   Lochia:  appropriate   Uterine Fundus:   firm with appropriate involution   Perineum: Episiotomy:  None  Laceration:  Yes - Second degree midline, repaired   DVT Evaluation:  No evidence of DVT seen on physical exam.     Labs:     Blood Type/Rh:  O POS     Rubella:  Immune     Group B Strep:  negative       Admission on 2022   Component Date Value Ref Range Status    Placenta Alpha Microglobulin-1 2022 POSITIVE* Negative Final    Color, UA 2022 Yellow  Straw/Yellow Final    Clarity, UA 2022 Clear  Clear Final    Glucose, Ur 2022 Negative  Negative mg/dL Final    Bilirubin Urine 2022 Negative  Negative Final    Ketones, Urine 2022 Negative  Negative mg/dL Final    Specific Gravity, UA 2022 1.008  1.005 - 1.030 Final    Blood, Urine 2022 TRACE* Negative Final    pH, UA 2022 7.0  5.0 - 9.0 Final    Protein, UA 2022 Negative  Negative mg/dL Final    Urobilinogen, Urine 2022 0.2  <2.0 E.U./dL Final    Nitrite, Urine 2022 Negative  Negative Final    Leukocyte Esterase, Urine 2022 SMALL* Negative Final    Urine Reflex to Culture 2022 Not Indicated   Final    Amphetamine Screen, Urine 2022 Neg Negative <1000 ng/mL Final    Barbiturate Screen, Ur 07/08/2022 Neg  Negative < 200 ng/mL Final    Benzodiazepine Screen, Urine 07/08/2022 Neg  Negative < 200 ng/mL Final    Cannabinoid Scrn, Ur 07/08/2022 Neg  Negative < 50 ng/mL Final    Cocaine Metabolite Screen, Urine 07/08/2022 Neg  Negative < 300 ng/mL Final    Opiate Scrn, Ur 07/08/2022 Neg  Negative < 300 ng/mL Final    PCP Screen, Urine 07/08/2022 Neg  Negative < 25 ng/mL Final    Methadone Screen, Urine 07/08/2022 Neg  Negative <300 ng/mL Final    Propoxyphene Scrn, Ur 07/08/2022 Neg  Negative <300 ng/mL Final    Oxycodone Urine 07/08/2022 Neg  Negative <100 ng/mL Final    FENTANYL SCREEN, URINE 07/08/2022 Neg  Negative < 50 ng/mL Final    Drug Screen Comment: 07/08/2022 see below   Final    Comment: This method is a screening test to detect only these drug  classes as part of a medical workup. Confirmatory testing  by another method should be ordered if clinically indicated.       Bacteria, UA 07/08/2022 Negative  Negative /HPF Final    Hyaline Casts, UA 07/08/2022 0-1  0 - 5 /HPF Final    WBC, UA 07/08/2022 6-9* 0 - 5 /HPF Final    RBC, UA 07/08/2022 0-2  0 - 5 /HPF Final    Epithelial Cells, UA 07/08/2022 0-2  0 - 5 /HPF Final    WBC 07/08/2022 14.6* 4.5 - 11.0 K/uL Final    RBC 07/08/2022 3.99* 4.20 - 5.40 M/uL Final    Hemoglobin 07/08/2022 10.8* 12.0 - 16.0 g/dL Final    Hematocrit 07/08/2022 33.7* 37.0 - 47.0 % Final    MCV 07/08/2022 84.5  82.0 - 100.0 fL Final    MCH 07/08/2022 27.0  27.0 - 31.3 pg Final    MCHC 07/08/2022 32.0* 33.0 - 37.0 % Final    RDW 07/08/2022 15.0* 11.5 - 14.5 % Final    Platelets 58/68/0804 143  130 - 400 K/uL Final    Neutrophils % 07/08/2022 78.5  % Final    Lymphocytes % 07/08/2022 13.9  % Final    Monocytes % 07/08/2022 7.2  % Final    Eosinophils % 07/08/2022 0.2  % Final    Basophils % 07/08/2022 0.2  % Final    Neutrophils Absolute 07/08/2022 11.5* 1.4 - 6.5 K/uL Final    Lymphocytes Absolute 07/08/2022 2.0  1.0 - 4.8 K/uL Final    Monocytes Absolute 07/08/2022 1.1* 0.2 - 0.8 K/uL Final    Eosinophils Absolute 07/08/2022 0.0  0.0 - 0.7 K/uL Final    Basophils Absolute 07/08/2022 0.0  0.0 - 0.2 K/uL Final    Sodium 07/08/2022 136  135 - 144 mEq/L Final    Potassium 07/08/2022 3.9  3.4 - 4.9 mEq/L Final    Chloride 07/08/2022 103  95 - 107 mEq/L Final    CO2 07/08/2022 18* 20 - 31 mEq/L Final    Anion Gap 07/08/2022 15  9 - 15 mEq/L Final    Glucose 07/08/2022 86  70 - 99 mg/dL Final    BUN 07/08/2022 10  6 - 20 mg/dL Final    CREATININE 07/08/2022 0.64  0.50 - 0.90 mg/dL Final    GFR Non- 07/08/2022 >60.0  >60 Final    Comment: >60 mL/min/1.73m2 EGFR, calc. for ages 25 and older using the  MDRD formula (not corrected for weight), is valid for stable  renal function.  GFR  07/08/2022 >60.0  >60 Final    Comment: >60 mL/min/1.73m2 EGFR, calc. for ages 25 and older using the  MDRD formula (not corrected for weight), is valid for stable  renal function.  Calcium 07/08/2022 9.2  8.5 - 9.9 mg/dL Final    Total Protein 07/08/2022 6.7  6.3 - 8.0 g/dL Final    Albumin 07/08/2022 3.8  3.5 - 4.6 g/dL Final    Total Bilirubin 07/08/2022 0.3  0.2 - 0.7 mg/dL Final    Alkaline Phosphatase 07/08/2022 173* 40 - 130 U/L Final    ALT 07/08/2022 6  0 - 33 U/L Final    AST 07/08/2022 16  0 - 35 U/L Final    Globulin 07/08/2022 2.9  2.3 - 3.5 g/dL Final    Hep B S Ag Interp 07/08/2022 Non-reactive   Final    RPR 07/08/2022 Non-reactive  Non-reactive Final    ABO/Rh 07/08/2022 O POS   Final    Antibody Screen 07/08/2022 NEG   Final    SARS-CoV-2, NAAT 07/08/2022 Not Detected  Not Detected Final    Comment: Rapid NAAT:   Negative results should be treated as presumptive and,  if inconsistent with clinical signs and symptoms or necessary for  patient management, should be tested with an alternative molecular  assay.  Negative results do not preclude SARS-CoV-2 infection and  should not be used as the sole basis for patient management decisions. This test has been authorized by the FDA under an Emergency Use  Authorization (EUA) for use by authorized laboratories. Fact sheet for Healthcare Providers:  Kassandra.julia  Fact sheet for Patients: Kassandra.es    METHODOLOGY: Isothermal Nucleic Acid Amplification      WBC 07/09/2022 16.9* 4.5 - 11.0 K/uL Final    RBC 07/09/2022 3.19* 4.20 - 5.40 M/uL Final    Hemoglobin 07/09/2022 8.7* 12.0 - 16.0 g/dL Final    Hematocrit 07/09/2022 26.7* 37.0 - 47.0 % Final    MCV 07/09/2022 83.8  82.0 - 100.0 fL Final    MCH 07/09/2022 27.3  27.0 - 31.3 pg Final    MCHC 07/09/2022 32.6* 33.0 - 37.0 % Final    RDW 07/09/2022 15.3* 11.5 - 14.5 % Final    Platelets 84/57/2507 128* 130 - 400 K/uL Final    Neutrophils % 07/09/2022 83.4  % Final    Lymphocytes % 07/09/2022 7.7  % Final    Monocytes % 07/09/2022 8.8  % Final    Eosinophils % 07/09/2022 0.0  % Final    Basophils % 07/09/2022 0.1  % Final    Neutrophils Absolute 07/09/2022 14.1* 1.4 - 6.5 K/uL Final    Lymphocytes Absolute 07/09/2022 1.3  1.0 - 4.8 K/uL Final    Monocytes Absolute 07/09/2022 1.5* 0.2 - 0.8 K/uL Final    Eosinophils Absolute 07/09/2022 0.0  0.0 - 0.7 K/uL Final    Basophils Absolute 07/09/2022 0.0  0.0 - 0.2 K/uL Final       Assessment:     Status post vaginal delivery, doing well. Rh Positive  Rubella Immune  Prolonged rupture of membranes  Postpartum Hemorrhage    Plan:     Discharge home with standard precautions and return to clinic in 10-14 days. Routine postpartum instructions reviewed with patient.       Reasons for Admission on 7/8/2022  1:09 AM  Onset of Labor  ROM (Prolonged)    Prenatal Procedures  Ultrasound # 4  Management of Medical Complications: History of depression, marijuana use, and herpes simplex    Intrapartum Procedures  Spontaneous Vaginal Delivery  Repair Discharge to: Home    Discharge Date: 7/10/22

## 2022-07-10 NOTE — LACTATION NOTE
In to visit mother   Infant is under bili lights  Mother states her nipples are sore using lanolin cream  Mother gave infant formula with the last feed  Encouraged mother call with the next feed    0945  Demonstrated how to keep bili blanket on infant while mother breast feeds  Infant crying at the breast  Rooting  Infant latched and nursed for 6 minutes    12  Mother states infant is fussy after feeding Mother requesting formula

## 2022-07-11 NOTE — FLOWSHEET NOTE
Viable infant baby girl delivered at 5. Pitocin start at 167 at  2001 per Tono Arevalo request.   Cord clamp at 2002, placenta delivered at 2005. Pitocin start to run at 87.7 and LR 37.3 at 2013. Kaykay begin laceration repair. Kaykay request Cytotec at 1. Patient not tolerating repair, observed yelling and attempting grab at provider. Nubain request at 2021. Patient bleeding continues. Kaykay request Methergine at 2026, given Left IM at 2030. OBERRT called at 2031. OB team arrive 2032. CRNA request by Tono Arevalo to assist with pain control, called at 2034. Second IV placed by Emery Hernandez RN, right arm. Respiratory/ICU and lab arrive around 2035. Hemabate request and Dr. Muna Leslie call to bedside at 2040. CRNA page at 2041. Dr. Muna Leslie bedside for exam sweep/ repair at 2041. 2041 TXA started. 2046 CRNA at bedside. Per Dr. Muna Leslie ok to start second TXA x 20 minutes, bleeding is controlled currently but wants to run second TXA to be safe. Second TXA start at 2105. Patient bleeding remains control, Dr. Muna Leslie complete repair and patient comfortable. RN remains at bedside to provide comfort and begin recovery.

## 2022-07-13 LAB
C TRACH DNA GENITAL QL NAA+PROBE: NEGATIVE
N. GONORRHOEAE DNA: NEGATIVE

## 2022-07-21 ENCOUNTER — OFFICE VISIT (OUTPATIENT)
Dept: OBGYN CLINIC | Age: 22
End: 2022-07-21
Payer: COMMERCIAL

## 2022-07-21 VITALS
DIASTOLIC BLOOD PRESSURE: 70 MMHG | WEIGHT: 155 LBS | BODY MASS INDEX: 25.83 KG/M2 | HEIGHT: 65 IN | SYSTOLIC BLOOD PRESSURE: 118 MMHG

## 2022-07-21 DIAGNOSIS — K59.00 CONSTIPATION, UNSPECIFIED CONSTIPATION TYPE: ICD-10-CM

## 2022-07-21 PROBLEM — O42.90 FETAL MEMBRANES, PROLONGED RUPTURE: Status: RESOLVED | Noted: 2022-07-10 | Resolved: 2022-07-21

## 2022-07-21 PROBLEM — F12.90 MARIJUANA USE: Status: RESOLVED | Noted: 2021-12-16 | Resolved: 2022-07-21

## 2022-07-21 PROCEDURE — 99213 OFFICE O/P EST LOW 20 MIN: CPT | Performed by: ADVANCED PRACTICE MIDWIFE

## 2022-07-21 RX ORDER — POLYETHYLENE GLYCOL 3350 17 G/17G
17 POWDER, FOR SOLUTION ORAL DAILY PRN
Qty: 507 G | Refills: 1 | Status: SHIPPED | OUTPATIENT
Start: 2022-07-21 | End: 2023-01-17

## 2022-07-21 ASSESSMENT — ENCOUNTER SYMPTOMS
NAUSEA: 0
DIARRHEA: 0
CONSTIPATION: 0
VOMITING: 0
SHORTNESS OF BREATH: 0
ABDOMINAL PAIN: 0

## 2022-07-21 NOTE — PROGRESS NOTES
SUBJECTIVE:  Dmitry Groves is a 23 y.o. female who presents for a Post-Partum visit. Vaginal delivery on 7/8/22, she is 2 weeks post-partum. Concerns today:  None, doing well  Constipation:  Yes, requesting a prescription for Miralax  Hemorrhoids:  No  Voiding without difficulty  Postpartum Bleeding:  Light    Perineum:  Non-Tender  [place]:  Has not been resumed  Conception Plans:  does not desire pregnancy within 12 months    Infant Feeding - bottle feeding. Breast Discomfort:  Denies excessive fullness, inflammation, pain, fever/chills, feeling of malaise. Review of Systems   Eyes:  Negative for visual disturbance. Respiratory:  Negative for shortness of breath. Gastrointestinal:  Negative for abdominal pain, constipation, diarrhea, nausea and vomiting. Genitourinary:  Negative for dysuria, vaginal bleeding and vaginal discharge. Neurological:  Negative for headaches. OBJECTIVE:  Vitals:  /70   Ht 5' 5\" (1.651 m)   Wt 155 lb (70.3 kg)   LMP 09/23/2021 (Within Days)   Breastfeeding No   BMI 25.79 kg/m²     Physical Exam  Appearance:  Normal appearance  Cardiovascular:  Normal rate, Capillary refill less than 2 seconds  Pulmonary:  Normal effort, no distress  Abdominal:  No tenderness  MS:  No Swelling, No dependent edema  Skin:  Warm, dry  Neuro:  Alert and oriented x3, reflexes normal.  Psychiatric:  Normal mood and behavior    ASSESSMENT & PLAN:   Diagnosis Orders   1. Postpartum care following vaginal delivery        2. Constipation, unspecified constipation type  polyethylene glycol (GLYCOLAX) 17 GM/SCOOP powder          Postpartum Care following a Vaginal Delivery  Doing well, good family support  bottle feeding  [place]:  Has not been resumed    Constipation  Start Miralax daily as needed    Dysmenorrhea  Wishes to initiate a hormonal contraceptive method to prevent uncomfortable menstrual symptoms from returning.   Following a discussion of hormonal contraceptive methods, risks/benefits of each, she wishes to begin combined OCP's at 6 weeks postpartum visit. Return in about 4 weeks (around 8/18/2022) for 6-week postpartum visit.     DENISE Owens CNM

## 2022-09-15 ENCOUNTER — OFFICE VISIT (OUTPATIENT)
Dept: OBGYN CLINIC | Age: 22
End: 2022-09-15
Payer: COMMERCIAL

## 2022-09-15 VITALS
HEIGHT: 65 IN | SYSTOLIC BLOOD PRESSURE: 102 MMHG | WEIGHT: 152 LBS | DIASTOLIC BLOOD PRESSURE: 60 MMHG | HEART RATE: 64 BPM | BODY MASS INDEX: 25.33 KG/M2

## 2022-09-15 DIAGNOSIS — N94.6 DYSMENORRHEA: Primary | ICD-10-CM

## 2022-09-15 PROCEDURE — 99214 OFFICE O/P EST MOD 30 MIN: CPT | Performed by: ADVANCED PRACTICE MIDWIFE

## 2022-09-15 PROCEDURE — 1036F TOBACCO NON-USER: CPT | Performed by: ADVANCED PRACTICE MIDWIFE

## 2022-09-15 PROCEDURE — G8419 CALC BMI OUT NRM PARAM NOF/U: HCPCS | Performed by: ADVANCED PRACTICE MIDWIFE

## 2022-09-15 PROCEDURE — G8427 DOCREV CUR MEDS BY ELIG CLIN: HCPCS | Performed by: ADVANCED PRACTICE MIDWIFE

## 2022-09-15 RX ORDER — IBUPROFEN 800 MG/1
800 TABLET ORAL EVERY 8 HOURS PRN
Qty: 120 TABLET | Refills: 1 | Status: SHIPPED | OUTPATIENT
Start: 2022-09-15 | End: 2023-05-13

## 2022-09-15 RX ORDER — DROSPIRENONE AND ETHINYL ESTRADIOL 0.02-3(28)
1 KIT ORAL DAILY
Qty: 28 TABLET | Refills: 2 | Status: SHIPPED | OUTPATIENT
Start: 2022-09-15 | End: 2022-10-12

## 2022-09-16 ASSESSMENT — ENCOUNTER SYMPTOMS
ABDOMINAL PAIN: 0
VOMITING: 0
DIARRHEA: 0
SHORTNESS OF BREATH: 0
CONSTIPATION: 0
NAUSEA: 0
COUGH: 0

## 2022-09-16 NOTE — PROGRESS NOTES
SUBJECTIVE:  Kelsy Finn is a 23 y.o. female who presents for a Post-Partum visit. Vaginal delivery on 7/8/22, she is 10 weeks partum. Concerns today:  None, doing well  Constipation:  No  Hemorrhoids:  No  Voiding without difficulty  Postpartum Bleeding:  Resolved   Perineum:  Non-Tender  [place]:  Has been resumed, minimal discomfort    Infant Feeding - bottle feeding. Breast Discomfort:  Denies excessive fullness, inflammation, pain, fever/chills, feeling of malaise. Dysmenorrhea   Wishes to initiate a hormonal contraceptive method to relieve uncomfortable menstrual symptoms. Considering pregnancy within the next 12 months:  No  Following a discussion of expected changes in menstrual bleeding, possible side effects, non-contraceptive benefits, and the effect on future fertility, wishes to initiate Combined OCP's      Review of Systems   Respiratory:  Negative for cough and shortness of breath. Gastrointestinal:  Negative for abdominal pain, constipation, diarrhea, nausea and vomiting. Genitourinary:  Negative for difficulty urinating, dysuria, menstrual problem, pelvic pain, vaginal bleeding and vaginal discharge. All other systems reviewed and are negative. OBJECTIVE:  Vitals:  /60 (Site: Right Upper Arm, Position: Sitting, Cuff Size: Medium Adult)   Pulse 64   Ht 5' 5\" (1.651 m)   Wt 152 lb (68.9 kg)   Breastfeeding No   BMI 25.29 kg/m²     Postpartum Depression Screening:  negative    Physical Exam  Appearance:  Normal appearance  Cardiovascular:  Normal rate, Capillary refill less than 2 seconds  Pulmonary:  Normal effort, no distress  Abdominal:  No tenderness  MS:  No Swelling, No dependent edema  Skin:  Warm, dry  Neuro:  Alert and oriented x3, reflexes normal.  Psychiatric:  Normal mood and behavior    ASSESSMENT & PLAN:   Diagnosis Orders   1.  Dysmenorrhea  drospirenone-ethinyl estradiol (YOSHI) 3-0.02 MG per tablet    ibuprofen (ADVIL;MOTRIN) 800 MG tablet Postpartum Care following a Vaginal Delivery  Doing well, good family support  bottle feeding  [place]:  Has been resumed, minimal discomfort    Dysmenorrhea  Wishes to initiate a hormonal contraceptive method to prevent uncomfortable menstrual symptoms from returning. Use Ibuprofen as needed to relieve menstrual cramping. Combined OCP's, Initial Prescription  Quick start Shweta (3-0.02)  RTC in 2 months for surveillance    Return in about 2 months (around 11/15/2022) for Follow-up on new medication, (virtual is fine).     DENISE Cardoso CNM No...

## 2022-10-08 DIAGNOSIS — N94.6 DYSMENORRHEA: ICD-10-CM

## 2022-12-01 ENCOUNTER — OFFICE VISIT (OUTPATIENT)
Dept: OBGYN CLINIC | Age: 22
End: 2022-12-01
Payer: COMMERCIAL

## 2022-12-01 VITALS
BODY MASS INDEX: 24.13 KG/M2 | WEIGHT: 145 LBS | SYSTOLIC BLOOD PRESSURE: 116 MMHG | HEART RATE: 75 BPM | DIASTOLIC BLOOD PRESSURE: 72 MMHG

## 2022-12-01 DIAGNOSIS — N94.6 DYSMENORRHEA: Primary | ICD-10-CM

## 2022-12-01 DIAGNOSIS — Z30.41 ORAL CONTRACEPTIVE PILL SURVEILLANCE: ICD-10-CM

## 2022-12-01 PROCEDURE — 1036F TOBACCO NON-USER: CPT | Performed by: ADVANCED PRACTICE MIDWIFE

## 2022-12-01 PROCEDURE — G8427 DOCREV CUR MEDS BY ELIG CLIN: HCPCS | Performed by: ADVANCED PRACTICE MIDWIFE

## 2022-12-01 PROCEDURE — G8484 FLU IMMUNIZE NO ADMIN: HCPCS | Performed by: ADVANCED PRACTICE MIDWIFE

## 2022-12-01 PROCEDURE — 99214 OFFICE O/P EST MOD 30 MIN: CPT | Performed by: ADVANCED PRACTICE MIDWIFE

## 2022-12-01 PROCEDURE — G8420 CALC BMI NORM PARAMETERS: HCPCS | Performed by: ADVANCED PRACTICE MIDWIFE

## 2022-12-01 RX ORDER — NORETHINDRONE ACETATE AND ETHINYL ESTRADIOL 1MG-20(21)
1 KIT ORAL DAILY
Qty: 1 PACKET | Refills: 3 | COMMUNITY
Start: 2022-12-01

## 2022-12-01 RX ORDER — NORETHINDRONE ACETATE AND ETHINYL ESTRADIOL, ETHINYL ESTRADIOL AND FERROUS FUMARATE 1MG-10(24)
1 KIT ORAL DAILY
Qty: 30 TABLET | Refills: 2 | Status: SHIPPED | OUTPATIENT
Start: 2022-12-01 | End: 2023-03-01

## 2022-12-01 ASSESSMENT — ENCOUNTER SYMPTOMS
VOMITING: 0
DIARRHEA: 0
COUGH: 0
NAUSEA: 0
CONSTIPATION: 0
SHORTNESS OF BREATH: 0
ABDOMINAL PAIN: 0

## 2022-12-01 NOTE — PROGRESS NOTES
SUBJECTIVE:  Aubrey Silvestre is a 23 y.o. female who presents here today for complaints of:      Chief Complaint   Patient presents with    Follow-up     Follow up for bc, pt had to stop taking it after a couple days due to stomach pains. Dysmenorrhea   Utilizing hormonal contraception to relieve uncomfortable menstrual symptoms. Here to follow-up on Shweta (3-0.02). She discontinued use after about 1 week due to the adverse side effect of N/V. She would like to try a different combined OCP to see if this might be better tolerated. Review of Systems   Respiratory:  Negative for cough and shortness of breath. Gastrointestinal:  Negative for abdominal pain, constipation, diarrhea, nausea and vomiting. Genitourinary:  Positive for menstrual problem. Negative for difficulty urinating, dysuria, pelvic pain, vaginal bleeding and vaginal discharge. All other systems reviewed and are negative. OBJECTIVE:  Vitals:  /72   Pulse 75   Wt 145 lb (65.8 kg)   BMI 24.13 kg/m²     Physical Exam  Appearance:  Normal appearance  Cardiovascular:  Normal rate, Capillary refill less than 2 seconds  Pulmonary:  Normal effort, no distress  Abdominal:  No tenderness  MS:  No Swelling, No dependent edema  Skin:  Warm, dry  Neuro:  Alert and oriented x3, reflexes normal.  Psychiatric:  Normal mood and behavior    ASSESSMENT & PLAN:   Diagnosis Orders   1. Dysmenorrhea  norethindrone-ethinyl estradiol-Fe (LO LOESTRIN FE) 1 MG-10 MCG / 10 MCG tablet    norethindrone-ethinyl estradiol (LOESTRIN FE 1/20) 1-20 MG-MCG per tablet      2. Oral contraceptive pill surveillance            Dysmenorrhea  Utilizing hormonal contraception to relieve uncomfortable menstrual symptoms. Combined OCP Surveillance  Discontinue Shweta  Quick start Lo Loestrin Fe (1-10), 1 month sample provided.   RTC in 2 months for surveillance    Return in about 2 months (around 2/1/2023) for Follow-up on new medication, (virtual is fine).    DENISE Cannon CNM

## 2022-12-10 ENCOUNTER — OFFICE VISIT (OUTPATIENT)
Dept: OBGYN CLINIC | Age: 22
End: 2022-12-10

## 2022-12-10 VITALS
WEIGHT: 143 LBS | DIASTOLIC BLOOD PRESSURE: 64 MMHG | SYSTOLIC BLOOD PRESSURE: 124 MMHG | HEART RATE: 86 BPM | BODY MASS INDEX: 23.8 KG/M2

## 2022-12-10 DIAGNOSIS — F32.A ANXIETY AND DEPRESSION: ICD-10-CM

## 2022-12-10 DIAGNOSIS — Z13.31 POSITIVE DEPRESSION SCREENING: Primary | ICD-10-CM

## 2022-12-10 DIAGNOSIS — F41.9 ANXIETY AND DEPRESSION: ICD-10-CM

## 2022-12-10 RX ORDER — ESCITALOPRAM OXALATE 10 MG/1
10 TABLET ORAL DAILY
Qty: 30 TABLET | Refills: 3 | Status: SHIPPED | OUTPATIENT
Start: 2022-12-10

## 2022-12-10 ASSESSMENT — PATIENT HEALTH QUESTIONNAIRE - PHQ9
SUM OF ALL RESPONSES TO PHQ9 QUESTIONS 1 & 2: 3
SUM OF ALL RESPONSES TO PHQ QUESTIONS 1-9: 7
5. POOR APPETITE OR OVEREATING: 3
10. IF YOU CHECKED OFF ANY PROBLEMS, HOW DIFFICULT HAVE THESE PROBLEMS MADE IT FOR YOU TO DO YOUR WORK, TAKE CARE OF THINGS AT HOME, OR GET ALONG WITH OTHER PEOPLE: 1
SUM OF ALL RESPONSES TO PHQ QUESTIONS 1-9: 7
SUM OF ALL RESPONSES TO PHQ QUESTIONS 1-9: 7
6. FEELING BAD ABOUT YOURSELF - OR THAT YOU ARE A FAILURE OR HAVE LET YOURSELF OR YOUR FAMILY DOWN: 1
SUM OF ALL RESPONSES TO PHQ QUESTIONS 1-9: 7
7. TROUBLE CONCENTRATING ON THINGS, SUCH AS READING THE NEWSPAPER OR WATCHING TELEVISION: 0
2. FEELING DOWN, DEPRESSED OR HOPELESS: 2
8. MOVING OR SPEAKING SO SLOWLY THAT OTHER PEOPLE COULD HAVE NOTICED. OR THE OPPOSITE, BEING SO FIGETY OR RESTLESS THAT YOU HAVE BEEN MOVING AROUND A LOT MORE THAN USUAL: 0
4. FEELING TIRED OR HAVING LITTLE ENERGY: 0
9. THOUGHTS THAT YOU WOULD BE BETTER OFF DEAD, OR OF HURTING YOURSELF: 0
1. LITTLE INTEREST OR PLEASURE IN DOING THINGS: 1

## 2022-12-10 ASSESSMENT — ENCOUNTER SYMPTOMS
ABDOMINAL PAIN: 0
SHORTNESS OF BREATH: 0
DIARRHEA: 0
VOMITING: 0
NAUSEA: 0
COUGH: 0
CONSTIPATION: 0

## 2022-12-10 NOTE — PROGRESS NOTES
SUBJECTIVE:  Marco A Bunn is a 23 y.o. female who presents here today for complaints of:      Chief Complaint   Patient presents with    Postpartum Care     Pt would like to discus PPD. Depression/Anxiety  Reports a history of having personal struggles and problems, but since giving birth in July, they have really become overwhelming and to interfere with her day to day activities. Discussed treatment options including starting SSRI therapy, behavioral wellness services, or doing both options at the same time. Review of Systems   Respiratory:  Negative for cough and shortness of breath. Gastrointestinal:  Negative for abdominal pain, constipation, diarrhea, nausea and vomiting. Genitourinary:  Negative for difficulty urinating, dysuria, menstrual problem, pelvic pain, vaginal bleeding and vaginal discharge. Psychiatric/Behavioral:  Positive for dysphoric mood. All other systems reviewed and are negative. OBJECTIVE:  Vitals:  /64   Pulse 86   Wt 143 lb (64.9 kg)   BMI 23.80 kg/m²     Physical Exam  Appearance:  Normal appearance  Cardiovascular:  Normal rate, Capillary refill less than 2 seconds  Pulmonary:  Normal effort, no distress  Abdominal:  No tenderness  MS:  No Swelling, No dependent edema  Skin:  Warm, dry  Neuro:  Alert and oriented x3, reflexes normal.  Psychiatric:  Normal mood and behavior    ASSESSMENT & PLAN:   Diagnosis Orders   1. Positive depression screening  Frederik Osler, PhD, Psychology, [place]      2. Anxiety and depression  escitalopram (LEXAPRO) 10 MG tablet    Frederik Osler, PhD, Psychology, [place]          Depression, Anxiety, Positive Depression Screening  Start Lexapro 10mg daily, RTC in 4 weeks for follow-up  Referral given to begin Behavioral Wellness visits. Return for Follow-up on new medication.     DENISE Hamlin - ROCKY

## 2022-12-30 ENCOUNTER — OFFICE VISIT (OUTPATIENT)
Dept: BEHAVIORAL/MENTAL HEALTH CLINIC | Age: 22
End: 2022-12-30

## 2022-12-30 DIAGNOSIS — F33.41 RECURRENT MAJOR DEPRESSIVE DISORDER, IN PARTIAL REMISSION (HCC): Primary | ICD-10-CM

## 2022-12-30 ASSESSMENT — ANXIETY QUESTIONNAIRES
GAD7 TOTAL SCORE: 12
5. BEING SO RESTLESS THAT IT IS HARD TO SIT STILL: 1
2. NOT BEING ABLE TO STOP OR CONTROL WORRYING: 2
3. WORRYING TOO MUCH ABOUT DIFFERENT THINGS: 2
7. FEELING AFRAID AS IF SOMETHING AWFUL MIGHT HAPPEN: 1
4. TROUBLE RELAXING: 2
1. FEELING NERVOUS, ANXIOUS, OR ON EDGE: 2
6. BECOMING EASILY ANNOYED OR IRRITABLE: 2

## 2022-12-30 ASSESSMENT — PATIENT HEALTH QUESTIONNAIRE - PHQ9
SUM OF ALL RESPONSES TO PHQ QUESTIONS 1-9: 12
3. TROUBLE FALLING OR STAYING ASLEEP: 2
SUM OF ALL RESPONSES TO PHQ QUESTIONS 1-9: 12
4. FEELING TIRED OR HAVING LITTLE ENERGY: 2
SUM OF ALL RESPONSES TO PHQ QUESTIONS 1-9: 12
2. FEELING DOWN, DEPRESSED OR HOPELESS: 1
6. FEELING BAD ABOUT YOURSELF - OR THAT YOU ARE A FAILURE OR HAVE LET YOURSELF OR YOUR FAMILY DOWN: 1
SUM OF ALL RESPONSES TO PHQ9 QUESTIONS 1 & 2: 2
8. MOVING OR SPEAKING SO SLOWLY THAT OTHER PEOPLE COULD HAVE NOTICED. OR THE OPPOSITE, BEING SO FIGETY OR RESTLESS THAT YOU HAVE BEEN MOVING AROUND A LOT MORE THAN USUAL: 1
5. POOR APPETITE OR OVEREATING: 3
9. THOUGHTS THAT YOU WOULD BE BETTER OFF DEAD, OR OF HURTING YOURSELF: 0
SUM OF ALL RESPONSES TO PHQ QUESTIONS 1-9: 12
1. LITTLE INTEREST OR PLEASURE IN DOING THINGS: 1
7. TROUBLE CONCENTRATING ON THINGS, SUCH AS READING THE NEWSPAPER OR WATCHING TELEVISION: 1

## 2022-12-30 NOTE — PROGRESS NOTES
Behavioral Health Consultation  [place], [address]. Psychologist  12/30/22  10:45 AM EST      Time spent with Patient: 30 minutes  This is patient's first  [facility] appointment. Reason for Consult:  depression  Referring Provider: DENISE Toure - ROCKY  [address] [facility],  [address] [place]    Pt provided informed consent for the behavioral health program. Discussed with patient model of service to include the limits of confidentiality (i.e. abuse reporting, suicide intervention, etc.) and short-term intervention focused approach. Pt indicated understanding. Feedback given to PCP. S:  Presenting Concerns:  Pt reports that beginning in November she began feeling very unmotivated, fatigue, feelings of guilt, depressed mood. Notes that she does not necessarily have negative thoughts as often since starting medication but is having a lot of physical symptoms (difficulty getting up, etc.). She was having frequent tearfulness/crying episodes. Pt notes that since medication she is feeling better but still struggling with fatigue, lack of motivation. Pt also notes some anxious worry, worry about the baby, being away from baby. Finds it difficult to stop worrying. Pt reports that she has had episodes of depression previously. Pt reports that she lives with her boyfriend and baby. Pt reports that they have been together for almost 5 years. States that they have a very good relationship. Pt reports that she worked in a [employer] prior to having her baby. She does plan to resume work in the future. Pt reports that her boyfriend works construction; his hours vary. Pt reports that her mother, step-father, and boyfriend's grandparents are helpful. Pt reports that she is close with her mother. Step-dad has been around since was about 10. Pt is the oldest of four siblings 23, 16, and 15. She has another half sibling who is 15. She reports a good relationship with her siblings.   She does not see her half-sister. States that her biological father was not really part of her life for the past 10 years, but they have gotten closer recently. Pt reports that she had a relatively good childhood, no significant trauma. Pt reports that she had her daughter in July. She describes that the birth was somewhat traumatic noting that she hemorrhaged and was confused about what was happening at the time, experienced significant pain. Pt denies SI. History:          Diagnosis Date    Anxiety 2017    Depression     History of herpes genitalis 2021           Medications:   Current Outpatient Medications   Medication Sig Dispense Refill    sertraline (ZOLOFT) 25 MG tablet Take 1 tablet by mouth daily 30 tablet 2    nitrofurantoin, macrocrystal-monohydrate, (MACROBID) 100 MG capsule Take 1 capsule by mouth 2 times daily for 7 days 14 capsule 0    norethindrone-ethinyl estradiol-Fe (LO LOESTRIN FE) 1 MG-10 MCG / 10 MCG tablet Take 1 tablet by mouth daily 30 tablet 2     No current facility-administered medications for this visit.        Social History:   Social History     Socioeconomic History    Marital status: Single     Spouse name: Not on file    Number of children: Not on file    Years of education: Not on file    Highest education level: Not on file   Occupational History    Not on file   Tobacco Use    Smoking status: Former     Types: Cigarettes     Quit date: 2021     Years since quittin.1    Smokeless tobacco: Never   Vaping Use    Vaping Use: Former    Quit date: 2021    Substances: Nicotine   Substance and Sexual Activity    Alcohol use: Not Currently     Alcohol/week: 0.0 standard drinks     Comment: ETOH disorder; previous rehab/ tx; last drink 2021    Drug use: Not Currently     Types: Marijuana [place])     Comment: 2021 xanax possibly laced with fentanyl and heroin    Sexual activity: Yes     Partners: Male     Comment: N/A   Other Topics Concern Not on file   Social History Narrative    Not on file     Social Determinants of Health     Financial Resource Strain: Not on file   Food Insecurity: Not on file   Transportation Needs: Not on file   Physical Activity: Not on file   Stress: Not on file   Social Connections: Not on file   Intimate Partner Violence: Not on file   Housing Stability: Not on file         Family History:   Family History   Problem Relation Age of Onset    Asthma Other         UNCLE WITH MILD ASTHMA    No Known Problems Mother     No Known Problems Father     No Known Problems Paternal Grandmother     Pacemaker Paternal Grandfather     Anxiety Disorder Maternal Grandmother     Anxiety Disorder Maternal Grandfather     Epilepsy Sister     No Known Problems Sister     No Known Problems Brother     Congenital Heart Defect Brother     Breast Cancer Neg Hx        TOBACCO:   reports that she quit smoking about 13 months ago. Her smoking use included cigarettes. She has never used smokeless tobacco.  ETOH:   reports that she does not currently use alcohol. O:  MSE:    Appearance    alert, cooperative   Personal Hygiene : appropriately dressed, appropriately groomed, and healthy looking  Appetite abnormal: decreased  Sleep disturbance Yes, including: difficulty falling asleep  Fatigue Yes  Loss of pleasure Yes  Impulsive behavior No  Speech    spontaneous, normal rate, and normal volume  Mood   neutral   Affect    normal affect  Thought Content    intact  Thought Process    linear, goal directed, and coherent  Associations    logical connections  Insight    good  Judgment    good  Orientation    oriented to person, place, time, and general circumstances  Memory    recent and remote memory intact  Attention/Concentration    intact  Morbid ideation No  Suicide Assessment    no suicidal ideation        A:  Pt is given a diagnosis of MDD, Recurrent, in Partial Remission, with Anxious Distress.   Reports that since starting medication her depressive symptoms have decreased. Reports depressed mood and anhedonia some days, sleep disturbance, fatigue, significant appetite disturbance, trouble concentrating, feeling bad about self. Pt reports anxious worry as well, mostly related to baby/parenting. PHQ Scores 1/6/2023 12/30/2022 12/10/2022 5/18/2021 2/27/2018   PHQ2 Score 2 2 3 4 0   PHQ9 Score 10 12 7 13 0     Interpretation of Total Score Depression Severity: 1-4 = Minimal depression, 5-9 = Mild depression, 10-14 = Moderate depression, 15-19 = Moderately severe depression, 20-27 = Severe depression    Administered the PHQ9 which indicates a self report of moderate symptom distress. MAKENNA 7 SCORE 12/30/2022   MAKENNA-7 Total Score 12     Interpretation of MAKENNA-7 score: 5-9 = mild anxiety, 10-14 = moderate anxiety, 15+ = severe anxiety. Recommend referral to behavioral health for scores 10 or greater. Administered MAKENNA-7 which indicates a self report of moderate anxiety. Pt would benefit from [facility] services to increase coping skills to provide symptom management/control/relief. Diagnosis:    MDD, Recurrent, in Partial Remission, with Anxious Distress. Plan:  Pt interventions:  Established rapport, Conducted functional assessment, Supportive techniques, and Provided Psychoeducation re: [facility] model        Pt Behavioral Change Plan:  Follow up in 1 week    Please note this report has been partially produced using speech recognition software and may cause contain errors related to that system including grammar, punctuation and spelling as well as words and phrases that may seem inappropriate. If there are questions or concerns please feel free to contact me to clarify.

## 2023-01-06 ENCOUNTER — TELEMEDICINE (OUTPATIENT)
Dept: BEHAVIORAL/MENTAL HEALTH CLINIC | Age: 23
End: 2023-01-06
Payer: COMMERCIAL

## 2023-01-06 DIAGNOSIS — F33.41 RECURRENT MAJOR DEPRESSIVE DISORDER, IN PARTIAL REMISSION (HCC): Primary | ICD-10-CM

## 2023-01-06 PROCEDURE — 90832 PSYTX W PT 30 MINUTES: CPT | Performed by: PSYCHOLOGIST

## 2023-01-06 ASSESSMENT — PATIENT HEALTH QUESTIONNAIRE - PHQ9
2. FEELING DOWN, DEPRESSED OR HOPELESS: 1
1. LITTLE INTEREST OR PLEASURE IN DOING THINGS: 1
5. POOR APPETITE OR OVEREATING: 3
4. FEELING TIRED OR HAVING LITTLE ENERGY: 2
SUM OF ALL RESPONSES TO PHQ QUESTIONS 1-9: 10
SUM OF ALL RESPONSES TO PHQ QUESTIONS 1-9: 10
3. TROUBLE FALLING OR STAYING ASLEEP: 2
8. MOVING OR SPEAKING SO SLOWLY THAT OTHER PEOPLE COULD HAVE NOTICED. OR THE OPPOSITE, BEING SO FIGETY OR RESTLESS THAT YOU HAVE BEEN MOVING AROUND A LOT MORE THAN USUAL: 1
SUM OF ALL RESPONSES TO PHQ9 QUESTIONS 1 & 2: 2
SUM OF ALL RESPONSES TO PHQ QUESTIONS 1-9: 10
6. FEELING BAD ABOUT YOURSELF - OR THAT YOU ARE A FAILURE OR HAVE LET YOURSELF OR YOUR FAMILY DOWN: 0
7. TROUBLE CONCENTRATING ON THINGS, SUCH AS READING THE NEWSPAPER OR WATCHING TELEVISION: 0
SUM OF ALL RESPONSES TO PHQ QUESTIONS 1-9: 10
9. THOUGHTS THAT YOU WOULD BE BETTER OFF DEAD, OR OF HURTING YOURSELF: 0

## 2023-01-06 NOTE — PROGRESS NOTES
Behavioral Health Consultation  Suha Boyd, [address]. Psychologist  1/6/23  11:35  AM EST      Time spent with Patient: 20 minutes  This is patient's second  [facility] appointment. Reason for Consult:  depression  Referring Provider: No referring provider defined for this encounter. Feedback given to PCP. TELEHEALTH EVALUATION -- Audio and/or Visual (During NGOFR-98 public health emergency)    Due to COVID 19 outbreak, patient's office visit was converted to a virtual visit. Patient was contacted and agreed to proceed with a virtual visit via [employer] me. Patient reports that they are located at home. Provider located at home office. The risks and benefits of converting to a virtual visit were discussed in light of the current infectious disease epidemic. Patient also understood that insurance coverage and co-pays are up to their individual insurance plans. Patient provides verbal consent for this visit to be billed to insurance. Pursuant to the emergency declaration under the [address] [place] [address], Replaced by [facility] waiver authority and the [employer] Act, this Virtual  Visit was conducted, with patient's consent, to reduce the patient's risk of exposure to COVID-19 and provide continuity of care for an established patient. Services were provided through an audio and video synchronous discussion virtually to substitute for in-person clinic visit. S:  Presenting Concerns:  Pt reports significant lack of appetite. She is nauseous, unable to eat anything on some days. Pt reports that she is not sleeping well. However, describes that mood is somewhat improved. Discussed caring for infant and self-care. Discussed anxious worry since having baby. Discussed sources of support. Discussed impact of unhelpful thought patterns on functioning and symptom presentation.       History:        Diagnosis Date    Anxiety 09/13/2017 Depression     History of herpes genitalis 2021           Medications:   Current Outpatient Medications   Medication Sig Dispense Refill    escitalopram (LEXAPRO) 10 MG tablet Take 1 tablet by mouth daily 30 tablet 3    norethindrone-ethinyl estradiol-Fe (LO LOESTRIN FE) 1 MG-10 MCG / 10 MCG tablet Take 1 tablet by mouth daily 30 tablet 2    norethindrone-ethinyl estradiol (LOESTRIN FE ) 1-20 MG-MCG per tablet Take 1 tablet by mouth daily [id number]   2023 1 packet 3     No current facility-administered medications for this visit.        Social History:   Social History     Socioeconomic History    Marital status: Single     Spouse name: Not on file    Number of children: Not on file    Years of education: Not on file    Highest education level: Not on file   Occupational History    Not on file   Tobacco Use    Smoking status: Former     Types: Cigarettes     Quit date: 2021     Years since quittin.0    Smokeless tobacco: Never   Vaping Use    Vaping Use: Former    Quit date: 2021    Substances: Nicotine   Substance and Sexual Activity    Alcohol use: Not Currently     Alcohol/week: 0.0 standard drinks     Comment: ETOH disorder; previous rehab/ tx; last drink 2021    Drug use: Not Currently     Types: Marijuana [place])     Comment: 2021 xanax possibly laced with fentanyl and heroin    Sexual activity: Yes     Partners: Male     Comment: N/A   Other Topics Concern    Not on file   Social History Narrative    Not on file     Social Determinants of Health     Financial Resource Strain: Not on file   Food Insecurity: Not on file   Transportation Needs: Not on file   Physical Activity: Not on file   Stress: Not on file   Social Connections: Not on file   Intimate Partner Violence: Not on file   Housing Stability: Not on file         Family History:   Family History   Problem Relation Age of Onset    Asthma Other         UNCLE WITH MILD ASTHMA    No Known Problems Mother     No Known Problems Father     No Known Problems Paternal Grandmother     Pacemaker Paternal Grandfather     Anxiety Disorder Maternal Grandmother     Anxiety Disorder Maternal Grandfather     Epilepsy Sister     No Known Problems Sister     No Known Problems Brother     Congenital Heart Defect Brother     Breast Cancer Neg Hx        TOBACCO:   reports that she quit smoking about 13 months ago. Her smoking use included cigarettes. She has never used smokeless tobacco.  ETOH:   reports that she does not currently use alcohol. O:  MSE:  Appearance    alert, cooperative   Personal Hygiene : appropriately dressed, appropriately groomed, and healthy looking  Appetite abnormal: decreased  Sleep disturbance Yes, including: difficulty falling asleep  Fatigue Yes  Loss of pleasure Yes  Impulsive behavior No  Speech    spontaneous, normal rate, and normal volume  Mood   neutral   Affect    normal affect  Thought Content    intact  Thought Process    linear, goal directed, and coherent  Associations    logical connections  Insight    good  Judgment    good  Orientation    oriented to person, place, time, and general circumstances  Memory    recent and remote memory intact  Attention/Concentration    intact  Morbid ideation No  Suicide Assessment    no suicidal ideation        A:      PHQ Scores 1/6/2023 12/30/2022 12/10/2022 5/18/2021 2/27/2018   PHQ2 Score 2 2 3 4 0   PHQ9 Score 10 12 7 13 0     Interpretation of Total Score Depression Severity: 1-4 = Minimal depression, 5-9 = Mild depression, 10-14 = Moderate depression, 15-19 = Moderately severe depression, 20-27 = Severe depression    Administered the PHQ9 which indicates a self report of moderate symptom distress. Pt would benefit from [facility] services to increase coping skills to provide symptom management/control/relief. Diagnosis:    MDD, Recurrent, in Partial Remission, with Anxious Distress.               Plan:  Pt interventions:  Discussed various factors related to the development and maintenance of  depression (including biological, cognitive, behavioral, and environmental factors), [place]-setting to identify pt's primary goals for [facility] visit / overall health, Supportive techniques, Emphasized self-care as important for managing overall health, Identified maladaptive thoughts, and Emphasized importance of regular practice of relaxation strategies to target / promote stress reduction        Pt Behavioral Change Plan:  Follow up in 2 weeks    Please note this report has been partially produced using speech recognition software and may cause contain errors related to that system including grammar, punctuation and spelling as well as words and phrases that may seem inappropriate. If there are questions or concerns please feel free to contact me to clarify.

## 2023-01-07 ENCOUNTER — OFFICE VISIT (OUTPATIENT)
Dept: OBGYN CLINIC | Age: 23
End: 2023-01-07
Payer: COMMERCIAL

## 2023-01-07 VITALS
DIASTOLIC BLOOD PRESSURE: 64 MMHG | SYSTOLIC BLOOD PRESSURE: 118 MMHG | BODY MASS INDEX: 23.8 KG/M2 | HEART RATE: 76 BPM | WEIGHT: 143 LBS

## 2023-01-07 DIAGNOSIS — R10.819 SUPRAPUBIC TENDERNESS: Primary | ICD-10-CM

## 2023-01-07 DIAGNOSIS — F32.A ANXIETY AND DEPRESSION: ICD-10-CM

## 2023-01-07 DIAGNOSIS — F41.9 ANXIETY AND DEPRESSION: ICD-10-CM

## 2023-01-07 DIAGNOSIS — R35.0 URINARY FREQUENCY: ICD-10-CM

## 2023-01-07 PROCEDURE — G8427 DOCREV CUR MEDS BY ELIG CLIN: HCPCS | Performed by: ADVANCED PRACTICE MIDWIFE

## 2023-01-07 PROCEDURE — G8420 CALC BMI NORM PARAMETERS: HCPCS | Performed by: ADVANCED PRACTICE MIDWIFE

## 2023-01-07 PROCEDURE — 1036F TOBACCO NON-USER: CPT | Performed by: ADVANCED PRACTICE MIDWIFE

## 2023-01-07 PROCEDURE — 99214 OFFICE O/P EST MOD 30 MIN: CPT | Performed by: ADVANCED PRACTICE MIDWIFE

## 2023-01-07 PROCEDURE — G8484 FLU IMMUNIZE NO ADMIN: HCPCS | Performed by: ADVANCED PRACTICE MIDWIFE

## 2023-01-07 RX ORDER — NITROFURANTOIN 25; 75 MG/1; MG/1
100 CAPSULE ORAL 2 TIMES DAILY
Qty: 14 CAPSULE | Refills: 0 | Status: SHIPPED | OUTPATIENT
Start: 2023-01-07 | End: 2023-01-14

## 2023-01-07 RX ORDER — SERTRALINE HYDROCHLORIDE 25 MG/1
25 TABLET, FILM COATED ORAL DAILY
Qty: 30 TABLET | Refills: 2 | Status: SHIPPED | OUTPATIENT
Start: 2023-01-07 | End: 2023-04-07

## 2023-01-07 ASSESSMENT — ENCOUNTER SYMPTOMS
DIARRHEA: 0
COUGH: 0
NAUSEA: 1
ABDOMINAL PAIN: 0
VOMITING: 0
SHORTNESS OF BREATH: 0
CONSTIPATION: 0

## 2023-01-07 NOTE — PROGRESS NOTES
SUBJECTIVE:  Farrah Avila is a 23 y.o. female who presents here today for complaints of:      Chief Complaint   Patient presents with    Follow-up     Follow up on lexapro, has been getting nauseous and has had no appetite, as well as sleep. Urinary Tract Infection     Pt thinks she may have a possible uti, has frequent urgency for urination as well as some abdomen pains. Depression & Anxiety  Started on Lexapro on 12/10/22, here for follow-up. Lexapro has made a good improvement to her mood and anxiety level. She has not increased her dose beyond 5mg. Unfortunately she is still experiencing nausea throughout the day, even though she is taking it at nighttime. Due to the nausea she rarely eats during the day and then because she has not eaten, she has difficulty sleepy and feels irritable. Urinary Frequency, Suprapubic Tenderness  Experiencing urinary frequency and lower abdominal cramping/tenderness. Feels like she may have a UTI. Review of Systems   Constitutional:  Positive for appetite change. Respiratory:  Negative for cough and shortness of breath. Gastrointestinal:  Positive for nausea. Negative for abdominal pain, constipation, diarrhea and vomiting. Genitourinary:  Positive for frequency and pelvic pain. Negative for difficulty urinating, dysuria, menstrual problem, vaginal bleeding and vaginal discharge. Psychiatric/Behavioral:  Positive for sleep disturbance. All other systems reviewed and are negative. OBJECTIVE:  Vitals:  /64   Pulse 76   Wt 143 lb (64.9 kg)   BMI 23.80 kg/m²     Physical Exam  Appearance:  Normal appearance  Cardiovascular:  Normal rate, Capillary refill less than 2 seconds  Pulmonary:  Normal effort, no distress  Abdominal:  No tenderness  MS:  No Swelling, No dependent edema  Skin:  Warm, dry  Neuro:  Alert and oriented x3, reflexes normal.  Psychiatric:  Normal mood and behavior    ASSESSMENT & PLAN:   Diagnosis Orders   1. Suprapubic tenderness  Culture, Urine    Urinalysis      2. Anxiety and depression  sertraline (ZOLOFT) 25 MG tablet      3. Urinary frequency  nitrofurantoin, macrocrystal-monohydrate, (MACROBID) 100 MG capsule    Culture, Urine    Urinalysis          Anxiety, Depression  Discontinue Lexapro  Start Zoloft 25mg/day    Urinary Frequency, Suprapubic Tenderness  Urine sent for UA with culture  Rx for Macrobid    Return if symptoms worsen or fail to improve.     DENISE Reynolds - ORBERTM

## 2023-01-08 DIAGNOSIS — R35.0 URINARY FREQUENCY: ICD-10-CM

## 2023-01-08 DIAGNOSIS — R10.819 SUPRAPUBIC TENDERNESS: ICD-10-CM

## 2023-01-08 LAB
BACTERIA: ABNORMAL /HPF
BILIRUBIN URINE: NEGATIVE
BLOOD, URINE: NEGATIVE
CLARITY: CLEAR
COLOR: YELLOW
EPITHELIAL CELLS, UA: ABNORMAL /HPF (ref 0–5)
GLUCOSE URINE: NEGATIVE MG/DL
HYALINE CASTS: ABNORMAL /HPF (ref 0–5)
KETONES, URINE: NEGATIVE MG/DL
LEUKOCYTE ESTERASE, URINE: ABNORMAL
NITRITE, URINE: NEGATIVE
PH UA: 6.5 (ref 5–9)
PROTEIN UA: NEGATIVE MG/DL
RBC UA: ABNORMAL /HPF (ref 0–5)
SPECIFIC GRAVITY UA: 1.01 (ref 1–1.03)
UROBILINOGEN, URINE: 0.2 E.U./DL
WBC UA: ABNORMAL /HPF (ref 0–5)

## 2023-01-10 LAB — URINE CULTURE, ROUTINE: NORMAL

## 2023-01-20 ENCOUNTER — TELEPHONE (OUTPATIENT)
Dept: BEHAVIORAL/MENTAL HEALTH CLINIC | Age: 23
End: 2023-01-20

## 2023-01-20 NOTE — TELEPHONE ENCOUNTER
Phone call to emergency contact as pt's phone did not seem to be working. Pt's significant other stated pt's phone not working and she was at store at time of appt. Requested call back.

## 2023-01-21 ENCOUNTER — OFFICE VISIT (OUTPATIENT)
Dept: OBGYN CLINIC | Age: 23
End: 2023-01-21
Payer: COMMERCIAL

## 2023-01-21 VITALS
DIASTOLIC BLOOD PRESSURE: 60 MMHG | BODY MASS INDEX: 23.8 KG/M2 | HEART RATE: 97 BPM | WEIGHT: 143 LBS | SYSTOLIC BLOOD PRESSURE: 108 MMHG

## 2023-01-21 DIAGNOSIS — R10.2 PELVIC PAIN: Primary | ICD-10-CM

## 2023-01-21 DIAGNOSIS — N89.8 VAGINAL DISCHARGE: ICD-10-CM

## 2023-01-21 DIAGNOSIS — R10.2 ADNEXAL PAIN: ICD-10-CM

## 2023-01-21 PROCEDURE — G8420 CALC BMI NORM PARAMETERS: HCPCS | Performed by: ADVANCED PRACTICE MIDWIFE

## 2023-01-21 PROCEDURE — 99214 OFFICE O/P EST MOD 30 MIN: CPT | Performed by: ADVANCED PRACTICE MIDWIFE

## 2023-01-21 PROCEDURE — G8484 FLU IMMUNIZE NO ADMIN: HCPCS | Performed by: ADVANCED PRACTICE MIDWIFE

## 2023-01-21 PROCEDURE — 1036F TOBACCO NON-USER: CPT | Performed by: ADVANCED PRACTICE MIDWIFE

## 2023-01-21 PROCEDURE — G8427 DOCREV CUR MEDS BY ELIG CLIN: HCPCS | Performed by: ADVANCED PRACTICE MIDWIFE

## 2023-01-21 RX ORDER — DOXYCYCLINE HYCLATE 100 MG/1
CAPSULE ORAL
COMMUNITY
Start: 2023-01-17

## 2023-01-21 RX ORDER — IBUPROFEN 800 MG/1
800 TABLET ORAL EVERY 8 HOURS PRN
Qty: 60 TABLET | Refills: 2 | Status: SHIPPED | OUTPATIENT
Start: 2023-01-21 | End: 2024-01-21

## 2023-01-21 ASSESSMENT — ENCOUNTER SYMPTOMS
CONSTIPATION: 0
NAUSEA: 0
DIARRHEA: 0
VOMITING: 0
SHORTNESS OF BREATH: 0
COUGH: 0
ABDOMINAL PAIN: 0

## 2023-01-21 NOTE — PROGRESS NOTES
SUBJECTIVE:  Anuj Arreaga is a 23 y.o. female who presents here today for complaints of:      Chief Complaint   Patient presents with    Sexually Transmitted Diseases     [facility] her for gonorrhea and chlamydia, even though the results were negative, they did say her cervix was inflamed though and that she definitely has an std. Pelvic Pain     Pt is having a lot of lower left pelvic pains as well, sometimes all over but mostly on the lower left side. Left Pelvic Pain  Experiencing intermittent left pelvic pain that alternates between a dull ache to severe at times. Symptoms have been occurring for the past several weeks. She was recently evaluated at [facility] and treated for possible STD exposure. Completion of antibiotics did not improve her symptoms. Vaginitis   Experiencing somewhat increase in vaginal discharge without increased odor, itching, or irritation. Review of Systems   Respiratory:  Negative for cough and shortness of breath. Gastrointestinal:  Negative for abdominal pain, constipation, diarrhea, nausea and vomiting. Genitourinary:  Positive for pelvic pain and vaginal discharge. Negative for difficulty urinating, dysuria, menstrual problem and vaginal bleeding. All other systems reviewed and are negative. OBJECTIVE:  Vitals:  /60   Pulse 97   Wt 143 lb (64.9 kg)   LMP 12/18/2022 (Exact Date)   BMI 23.80 kg/m²     Physical Exam  Vitals and nursing note reviewed. Constitutional:       General: She is not in acute distress. Appearance: Normal appearance. She is not ill-appearing, toxic-appearing or diaphoretic. HENT:      Head: Normocephalic. Nose: No congestion or rhinorrhea. Mouth/Throat:      Mouth: Mucous membranes are moist.   Eyes:      General: No scleral icterus. Right eye: No discharge. Left eye: No discharge.    Cardiovascular:      Rate and Rhythm: Normal rate and regular rhythm. Pulses: Normal pulses. Pulmonary:      Effort: Pulmonary effort is normal. No respiratory distress. Abdominal:      Palpations: Abdomen is soft. Hernia: There is no hernia in the left inguinal area or right inguinal area. Genitourinary:     General: Normal vulva. Exam position: Lithotomy position. Pubic Area: No rash or pubic lice. Labia:         Right: No rash, tenderness, lesion or injury. Left: No rash, tenderness, lesion or injury. Urethra: No prolapse, urethral pain, urethral swelling or urethral lesion. Vagina: No signs of injury and foreign body. No vaginal discharge, erythema, tenderness, bleeding, lesions or prolapsed vaginal walls. Cervix: No cervical motion tenderness, discharge, friability, lesion, erythema, cervical bleeding or eversion. Uterus: Not deviated, not enlarged, not fixed, not tender and no uterine prolapse. Adnexa:         Right: No mass, tenderness or fullness. Left: Tenderness present. No mass or fullness. Rectum: No mass or external hemorrhoid. Musculoskeletal:         General: Normal range of motion. Cervical back: Normal range of motion and neck supple. Right lower leg: No edema. Left lower leg: No edema. Lymphadenopathy:      Lower Body: No right inguinal adenopathy. No left inguinal adenopathy. Skin:     General: Skin is warm and dry. Capillary Refill: Capillary refill takes less than 2 seconds. Coloration: Skin is not jaundiced or pale. Neurological:      Mental Status: She is alert and oriented to person, place, and time. Mental status is at baseline. Motor: No weakness. Coordination: Coordination normal.      Gait: Gait normal.   Psychiatric:         Mood and Affect: Mood normal.         Behavior: Behavior normal.     ASSESSMENT & PLAN:   Diagnosis Orders   1. Pelvic pain  ibuprofen (ADVIL;MOTRIN) 800 MG tablet      2.  Adnexal pain US DUP ABD PEL RETRO SCROT COMPLETE    US NON OB TRANSVAGINAL    US PELVIS COMPLETE      3. Vaginal discharge            Left Pelvic Pain  Rx for Ibuprofen PRN Pain  Obtain pelvic US  RTC to discuss pelvic US results    Vaginal Discharge  Wet prep collected ([facility] screening at [facility] within the past 7 days). Treat if indicated    Return for Discuss pelvic US results.     Yue Downey, DENISE - ROCKY

## 2023-02-04 ENCOUNTER — APPOINTMENT (OUTPATIENT)
Dept: CT IMAGING | Age: 23
End: 2023-02-04
Payer: COMMERCIAL

## 2023-02-04 ENCOUNTER — HOSPITAL ENCOUNTER (EMERGENCY)
Age: 23
Discharge: HOME | End: 2023-02-04
Payer: COMMERCIAL

## 2023-02-04 VITALS
HEART RATE: 75 BPM | HEIGHT: 65 IN | RESPIRATION RATE: 18 BRPM | WEIGHT: 140 LBS | OXYGEN SATURATION: 100 % | BODY MASS INDEX: 23.32 KG/M2 | TEMPERATURE: 98.4 F | DIASTOLIC BLOOD PRESSURE: 80 MMHG | SYSTOLIC BLOOD PRESSURE: 112 MMHG

## 2023-02-04 DIAGNOSIS — R11.0 NAUSEA: ICD-10-CM

## 2023-02-04 DIAGNOSIS — R10.32 LEFT LOWER QUADRANT ABDOMINAL PAIN: Primary | ICD-10-CM

## 2023-02-04 LAB
ALBUMIN SERPL-MCNC: 4.8 G/DL (ref 3.5–4.6)
ALP BLD-CCNC: 78 U/L (ref 40–130)
ALT SERPL-CCNC: 8 U/L (ref 0–33)
ANION GAP SERPL CALCULATED.3IONS-SCNC: 11 MEQ/L (ref 9–15)
AST SERPL-CCNC: 15 U/L (ref 0–35)
BACTERIA: ABNORMAL /HPF
BASOPHILS ABSOLUTE: 0 K/UL (ref 0–0.2)
BASOPHILS RELATIVE PERCENT: 0.3 %
BILIRUB SERPL-MCNC: 0.7 MG/DL (ref 0.2–0.7)
BILIRUBIN URINE: NEGATIVE
BLOOD, URINE: NEGATIVE
BUN BLDV-MCNC: 14 MG/DL (ref 6–20)
CALCIUM SERPL-MCNC: 9.7 MG/DL (ref 8.5–9.9)
CHLORIDE BLD-SCNC: 104 MEQ/L (ref 95–107)
CLARITY: CLEAR
CO2: 24 MEQ/L (ref 20–31)
COLOR: YELLOW
CREAT SERPL-MCNC: 0.8 MG/DL (ref 0.5–0.9)
EOSINOPHILS ABSOLUTE: 0 K/UL (ref 0–0.7)
EOSINOPHILS RELATIVE PERCENT: 0 %
EPITHELIAL CELLS, UA: ABNORMAL /HPF (ref 0–5)
GFR SERPL CREATININE-BSD FRML MDRD: >60 ML/MIN/{1.73_M2}
GLOBULIN: 3.1 G/DL (ref 2.3–3.5)
GLUCOSE BLD-MCNC: 148 MG/DL (ref 70–99)
GLUCOSE URINE: NEGATIVE MG/DL
HCG, URINE, POC: NEGATIVE
HCT VFR BLD CALC: 42.9 % (ref 37–47)
HEMOGLOBIN: 14.3 G/DL (ref 12–16)
HYALINE CASTS: ABNORMAL /HPF (ref 0–5)
KETONES, URINE: 40 MG/DL
LACTIC ACID: 1.2 MMOL/L (ref 0.5–2.2)
LEUKOCYTE ESTERASE, URINE: ABNORMAL
LIPASE: 22 U/L (ref 12–95)
LYMPHOCYTES ABSOLUTE: 0.2 K/UL (ref 1–4.8)
LYMPHOCYTES RELATIVE PERCENT: 2 %
Lab: NORMAL
MCH RBC QN AUTO: 27.5 PG (ref 27–31.3)
MCHC RBC AUTO-ENTMCNC: 33.4 % (ref 33–37)
MCV RBC AUTO: 82.3 FL (ref 79.4–94.8)
MONOCYTES ABSOLUTE: 0.2 K/UL (ref 0.2–0.8)
MONOCYTES RELATIVE PERCENT: 2.3 %
NEGATIVE QC PASS/FAIL: NORMAL
NEUTROPHILS ABSOLUTE: 8.9 K/UL (ref 1.4–6.5)
NEUTROPHILS RELATIVE PERCENT: 95.4 %
NITRITE, URINE: NEGATIVE
PDW BLD-RTO: 16.3 % (ref 11.5–14.5)
PH UA: 6 (ref 5–9)
PLATELET # BLD: 182 K/UL (ref 130–400)
POSITIVE QC PASS/FAIL: NORMAL
POTASSIUM SERPL-SCNC: 3.6 MEQ/L (ref 3.4–4.9)
PROTEIN UA: 30 MG/DL
RBC # BLD: 5.22 M/UL (ref 4.2–5.4)
RBC UA: ABNORMAL /HPF (ref 0–5)
SODIUM BLD-SCNC: 139 MEQ/L (ref 135–144)
SPECIFIC GRAVITY UA: 1.03 (ref 1–1.03)
TOTAL PROTEIN: 7.9 G/DL (ref 6.3–8)
UROBILINOGEN, URINE: 0.2 E.U./DL
WBC # BLD: 9.4 K/UL (ref 4.5–11)
WBC UA: ABNORMAL /HPF (ref 0–5)

## 2023-02-04 PROCEDURE — 83605 ASSAY OF LACTIC ACID: CPT

## 2023-02-04 PROCEDURE — 6360000002 HC RX W HCPCS: Performed by: PHYSICIAN ASSISTANT

## 2023-02-04 PROCEDURE — 99285 EMERGENCY DEPT VISIT HI MDM: CPT

## 2023-02-04 PROCEDURE — 36415 COLL VENOUS BLD VENIPUNCTURE: CPT

## 2023-02-04 PROCEDURE — 6360000004 HC RX CONTRAST MEDICATION: Performed by: PHYSICIAN ASSISTANT

## 2023-02-04 PROCEDURE — 96374 THER/PROPH/DIAG INJ IV PUSH: CPT

## 2023-02-04 PROCEDURE — 74177 CT ABD & PELVIS W/CONTRAST: CPT

## 2023-02-04 PROCEDURE — 81001 URINALYSIS AUTO W/SCOPE: CPT

## 2023-02-04 PROCEDURE — 83690 ASSAY OF LIPASE: CPT

## 2023-02-04 PROCEDURE — 85025 COMPLETE CBC W/AUTO DIFF WBC: CPT

## 2023-02-04 PROCEDURE — 80053 COMPREHEN METABOLIC PANEL: CPT

## 2023-02-04 RX ORDER — ONDANSETRON 2 MG/ML
4 INJECTION INTRAMUSCULAR; INTRAVENOUS ONCE
Status: COMPLETED | OUTPATIENT
Start: 2023-02-04 | End: 2023-02-04

## 2023-02-04 RX ORDER — ONDANSETRON 4 MG/1
4 TABLET, ORALLY DISINTEGRATING ORAL 3 TIMES DAILY PRN
Qty: 20 TABLET | Refills: 0 | Status: SHIPPED | OUTPATIENT
Start: 2023-02-04 | End: 2023-02-24

## 2023-02-04 RX ADMIN — IOPAMIDOL 50 ML: 612 INJECTION, SOLUTION INTRAVENOUS at 10:52

## 2023-02-04 RX ADMIN — ONDANSETRON 4 MG: 2 INJECTION INTRAMUSCULAR; INTRAVENOUS at 11:39

## 2023-02-04 ASSESSMENT — ENCOUNTER SYMPTOMS
NAUSEA: 0
ABDOMINAL DISTENTION: 0
COLOR CHANGE: 0
RHINORRHEA: 0
DIARRHEA: 0
SORE THROAT: 0
EYE DISCHARGE: 0
SHORTNESS OF BREATH: 0
CONSTIPATION: 0
BACK PAIN: 1
ABDOMINAL PAIN: 1
VOMITING: 0

## 2023-02-04 ASSESSMENT — PAIN - FUNCTIONAL ASSESSMENT
PAIN_FUNCTIONAL_ASSESSMENT: 0-10
PAIN_FUNCTIONAL_ASSESSMENT: 0-10

## 2023-02-04 ASSESSMENT — PAIN DESCRIPTION - ORIENTATION: ORIENTATION: LEFT

## 2023-02-04 ASSESSMENT — LIFESTYLE VARIABLES
HOW OFTEN DO YOU HAVE A DRINK CONTAINING ALCOHOL: NEVER
HOW MANY STANDARD DRINKS CONTAINING ALCOHOL DO YOU HAVE ON A TYPICAL DAY: PATIENT DOES NOT DRINK

## 2023-02-04 ASSESSMENT — PAIN DESCRIPTION - LOCATION
LOCATION: FLANK
LOCATION: ABDOMEN

## 2023-02-04 ASSESSMENT — PAIN DESCRIPTION - DESCRIPTORS
DESCRIPTORS: CRAMPING
DESCRIPTORS: ACHING

## 2023-02-04 ASSESSMENT — PAIN SCALES - GENERAL
PAINLEVEL_OUTOF10: 7
PAINLEVEL_OUTOF10: 8

## 2023-02-04 NOTE — ED TRIAGE NOTES
Patient reports abdominal pain that's been ongoing since giving birth seven months ago. Patient states that her OB-GYN has tested her urine and ordered an ultrasound. Ultrasound appointment was scheduled for this past Thursday but patient has missed the appointment. Patient states that she has had intermittent vomiting with this pain. States pain worsened last night.

## 2023-02-04 NOTE — ED PROVIDER NOTES
[address] [facility] ED  eMERGENCY dEPARTMENT eNCOUnter      Pt Name: Chary Owens  MRN: [id number]  [place] 2000  Date of evaluation: 2/4/2023  Provider: Viridiana Marquez PA-C    CHIEF COMPLAINT       Chief Complaint   Patient presents with    Abdominal Pain     With emesis         HISTORY OF PRESENT ILLNESS   (Location/Symptom, Timing/Onset,Context/Setting, Quality, Duration, Modifying Factors, Severity)  Note limiting factors. Chary Owens is a 23 y.o. female who presents to the emergency department complaint of left lower quadrant abdominal pain which patient has been intermittently ongoing for the last 7 months since delivery of her child, she has been followed by OB/GYN without any acute findings at this time. Patient states she did have a scheduled ultrasound for this past Thursday, 2/2/2023, but states she did not keep this appointment. She states last evening increasing pain describes as a crampy type pain in the left lower quadrant rating to the left flank along with 1 episode of nausea and vomiting. She rates her current pain at this time is an 8 out of 10. Denies any vaginal discharge or bleeding. Medical history significant for anxiety, depression, genital herpes. HPI    NursingNotes were reviewed. REVIEW OF SYSTEMS    (2-9 systems for level 4, 10 or more for level 5)     Review of Systems   Constitutional:  Negative for activity change and appetite change. HENT:  Negative for congestion, ear discharge, ear pain, nosebleeds, rhinorrhea and sore throat. Eyes:  Negative for discharge. Respiratory:  Negative for shortness of breath. Cardiovascular:  Negative for chest pain, palpitations and leg swelling. Gastrointestinal:  Positive for abdominal pain. Negative for abdominal distention, constipation, diarrhea, nausea and vomiting. Genitourinary:  Positive for flank pain. Negative for difficulty urinating and dysuria. Musculoskeletal:  Positive for back pain. Negative for arthralgias. Skin:  Negative for color change, pallor, rash and wound. Neurological:  Negative for dizziness, syncope, numbness and headaches. Psychiatric/Behavioral:  Negative for agitation and confusion. Except as noted above the remainder of the review of systems was reviewed and negative. PAST MEDICAL HISTORY     Past Medical History:   Diagnosis Date    Anxiety 09/13/2017    Depression     History of herpes genitalis 12/1/2021         SURGICALHISTORY       Past Surgical History:   Procedure Laterality Date    ANKLE SURGERY      flat foot    DENTAL SURGERY  2008    DENTAL EXTRACTIONS    FOOT SURGERY  2012    for flat feet    TONSILLECTOMY AND ADENOIDECTOMY N/A 12/14/2017    TONSILLECTOMY  AND ADENOIDECTOMY performed by Telma Coyle MD at [address] [place] [address]       Previous Medications    DOXYCYCLINE HYCLATE (VIBRAMYCIN) 100 MG CAPSULE    TAKE 1 CAPSULE BY MOUTH TWICE A DAY FOR 7 DAYS    IBUPROFEN (ADVIL;MOTRIN) 800 MG TABLET    Take 1 tablet by mouth every 8 hours as needed for Pain (Cramping)    NORETHINDRONE-ETHINYL ESTRADIOL-FE (LO LOESTRIN FE) 1 MG-10 MCG / 10 MCG TABLET    Take 1 tablet by mouth daily    SERTRALINE (ZOLOFT) 25 MG TABLET    Take 1 tablet by mouth daily       ALLERGIES     Patient has no known allergies.     FAMILY HISTORY       Family History   Problem Relation Age of Onset    Asthma Other         UNCLE WITH MILD ASTHMA    No Known Problems Mother     No Known Problems Father     No Known Problems Paternal Grandmother     Pacemaker Paternal Grandfather     Anxiety Disorder Maternal Grandmother     Anxiety Disorder Maternal Grandfather     Epilepsy Sister     No Known Problems Sister     No Known Problems Brother     Congenital Heart Defect Brother     Breast Cancer Neg Hx           SOCIAL HISTORY       Social History     Socioeconomic History    Marital status: Single     Spouse name: None    Number of children: None    Years of education: None Highest education level: None   Tobacco Use    Smoking status: Former     Types: Cigarettes     Quit date: 2021     Years since quittin.1    Smokeless tobacco: Never   Vaping Use    Vaping Use: Former    Quit date: 2021    Substances: Nicotine   Substance and Sexual Activity    Alcohol use: Not Currently     Alcohol/week: 0.0 standard drinks     Comment: ETOH disorder; previous rehab/ tx; last drink 2021    Drug use: Not Currently     Types: Marijuana [place])     Comment: 2021 xanax possibly laced with fentanyl and heroin    Sexual activity: Yes     Partners: Male     Comment: N/A       SCREENINGS    Pham Coma Scale  Eye Opening: Spontaneous  Best Verbal Response: Oriented  Best Motor Response: Obeys commands  [place]way Coma Scale Score: 15 @FLOW(17077598)@      PHYSICAL EXAM    (up to 7 for level 4, 8 or more for level 5)     ED Triage Vitals [23 0850]   BP Temp Temp Source Heart Rate Resp SpO2 Height Weight   (!) 98/56 98.4 °F (36.9 °C) Oral (!) 110 18 99 % -- 140 lb (63.5 kg)       Physical Exam  Vitals and nursing note reviewed. Constitutional:       General: She is not in acute distress. Appearance: She is well-developed. She is not ill-appearing, toxic-appearing or diaphoretic. HENT:      Head: Normocephalic. Nose: No congestion. Mouth/Throat:      Mouth: Mucous membranes are moist.      Pharynx: No oropharyngeal exudate or posterior oropharyngeal erythema. Eyes:      Extraocular Movements: Extraocular movements intact. Conjunctiva/sclera: Conjunctivae normal.      Pupils: Pupils are equal, round, and reactive to light. Neck:      Vascular: No JVD. Trachea: No tracheal deviation. Cardiovascular:      Rate and Rhythm: Normal rate. Pulses: Normal pulses. Heart sounds: Normal heart sounds. No murmur heard. No friction rub. No gallop.    Pulmonary:      Effort: Pulmonary effort is normal. No tachypnea, accessory muscle usage, respiratory distress or retractions.      Breath sounds: No stridor. No wheezing, rhonchi or rales.   Chest:      Chest wall: No tenderness.   Abdominal:      General: Abdomen is flat. Bowel sounds are normal. There is no distension or abdominal bruit.      Palpations: There is no shifting dullness, fluid wave, hepatomegaly, splenomegaly, mass or pulsatile mass.      Tenderness: There is no abdominal tenderness. There is no right CVA tenderness, left CVA tenderness, guarding or rebound. Negative signs include Winslow's sign, Rovsing's sign and McBurney's sign.          Comments: Abdomen soft nondistended nontender no guarding mass rebound, no CVA tenderness.   Musculoskeletal:         General: No deformity.      Cervical back: Normal range of motion and neck supple. No rigidity.   Skin:     General: Skin is warm and dry.      Capillary Refill: Capillary refill takes less than 2 seconds.      Coloration: Skin is not jaundiced.   Neurological:      General: No focal deficit present.      Mental Status: She is alert and oriented to person, place, and time. Mental status is at baseline.      Cranial Nerves: No cranial nerve deficit.      Sensory: No sensory deficit.      Motor: No weakness.      Coordination: Coordination normal.   Psychiatric:         Mood and Affect: Mood normal.       DIAGNOSTIC RESULTS     EKG: All EKG's are interpreted by the Emergency Department Physician who either signs or Co-signsthis chart in the absence of a cardiologist.        RADIOLOGY:   Non-plain filmimages such as CT, Ultrasound and MRI are read by the radiologist. Plain radiographic images are visualized and preliminarily interpreted by the emergency physician with the below findings:      Interpretation per the Radiologist below, if available at the time ofthis note:    CT ABDOMEN PELVIS W IV CONTRAST Additional Contrast? None   Final Result   1.  Mildly distended stomach containing fluid, fluid in small bowel loops and   right colon.   Consider enterocolitis. 2.  No evidence of bowel obstruction. No acute inflammatory changes are   identified in the abdomen or pelvis. ED BEDSIDE ULTRASOUND:   Performed by ED Physician - none    LABS:  Labs Reviewed   COMPREHENSIVE METABOLIC PANEL - Abnormal; Notable for the following components:       Result Value    Glucose 148 (*)     Albumin 4.8 (*)     All other components within normal limits   CBC WITH AUTO DIFFERENTIAL - Abnormal; Notable for the following components:    RDW 16.3 (*)     Neutrophils Absolute 8.9 (*)     Lymphocytes Absolute 0.2 (*)     All other components within normal limits   URINALYSIS - Abnormal; Notable for the following components:    Ketones, Urine 40 (*)     Protein, UA 30 (*)     Leukocyte Esterase, Urine SMALL (*)     All other components within normal limits   MICROSCOPIC URINALYSIS - Abnormal; Notable for the following components:    Bacteria, UA RARE (*)     WBC, UA 10-20 (*)     All other components within normal limits   LACTIC ACID   LIPASE   POC PREGNANCY UR-QUAL       All other labs were within normal range or not returned as of this dictation. EMERGENCY DEPARTMENT COURSE and DIFFERENTIAL DIAGNOSIS/MDM:   Vitals:    Vitals:    02/04/23 0850 02/04/23 1048 02/04/23 1136   BP: (!) 98/56  112/80   Pulse: (!) 110  75   Resp: 18  18   Temp: 98.4 °F (36.9 °C)     TempSrc: Oral     SpO2: 99%  100%   Weight: 140 lb (63.5 kg)     Height:  5' 5\" (1.651 m)             MDM  Number of Diagnoses or Management Options  Left lower quadrant abdominal pain  Nausea  Diagnosis management comments: Presented to ED with complaint of left lower quadrant abdominal pain which patient states been ongoing for approximately the last 7 months since delivery of her child. She has been followed by her OB/GYN without any acute findings at this time, she was to report for an ultrasound this past Thursday, 2/2/2023, but patient states she did not attend the session.   She states her pain is still persistent and ongoing, and therefore she came to the ED also had 1 episodes of emesis today. CT scan of the abdomen pelvis shows no acute intra-abdominal process labs are fairly nonspecific at this time, patient was discharged with diagnosis nonspecific abdominal pain, nausea vomiting, given a prescription for Zofran, advised to contact her OB/GYN for further evaluation and follow-up. Should she have any worsening or change symptoms, was advised to return to the ED. CRITICAL CARE TIME   Total Critical Care time was 0 minutes, excluding separately reportableprocedures. There was a high probability of clinicallysignificant/life threatening deterioration in the patient's condition which required my urgent intervention. CONSULTS:  None    PROCEDURES:  Unless otherwise noted below, none     Procedures    FINAL IMPRESSION      1. Left lower quadrant abdominal pain    2.  Nausea          DISPOSITION/PLAN   DISPOSITION Decision To Discharge 02/04/2023 12:08:52 PM      PATIENT REFERRED TO:  DENISE Sorto - [facility]  [address] [address], [address]  [place] [address]  [phone]    In 3 days      DENISE Busby [facility]  [address]  [phone]    In 3 days      DISCHARGE MEDICATIONS:  New Prescriptions    ONDANSETRON (ZOFRAN-ODT) 4 MG DISINTEGRATING TABLET    Take 1 tablet by mouth 3 times daily as needed for Nausea or Vomiting          (Please note that portions of this note were completed with a voice recognition program.  Efforts were made to edit the dictations but occasionally words are mis-transcribed.)    Jay Guerra PA-C (electronically signed)  Attending Emergency Physician          Jay Guerra PA-C  02/04/23 4021

## 2023-02-05 LAB
GFR SERPL CREATININE-BSD FRML MDRD: >60 ML/MIN/{1.73_M2}
PERFORMED ON: NORMAL
POC CREATININE: 0.8 MG/DL (ref 0.6–1.2)
POC SAMPLE TYPE: NORMAL

## 2023-03-03 DIAGNOSIS — N94.6 DYSMENORRHEA: ICD-10-CM

## 2023-03-03 RX ORDER — NORETHINDRONE ACETATE AND ETHINYL ESTRADIOL, ETHINYL ESTRADIOL AND FERROUS FUMARATE 1MG-10(24)
1 KIT ORAL DAILY
Qty: 90 TABLET | Refills: 3 | Status: SHIPPED | OUTPATIENT
Start: 2023-03-03 | End: 2024-03-02

## 2023-06-15 DIAGNOSIS — Z20.2 POSSIBLE EXPOSURE TO STD: ICD-10-CM

## 2023-06-15 DIAGNOSIS — R10.2 SUPRAPUBIC PAIN: ICD-10-CM

## 2023-06-15 LAB
BILIRUB UR QL STRIP: NEGATIVE
CLARITY UR: CLEAR
COLOR UR: YELLOW
GLUCOSE UR STRIP-MCNC: NEGATIVE MG/DL
HGB UR QL STRIP: NEGATIVE
KETONES UR STRIP-MCNC: ABNORMAL MG/DL
LEUKOCYTE ESTERASE UR QL STRIP: NEGATIVE
NITRITE UR QL STRIP: NEGATIVE
PH UR STRIP: 5.5 [PH] (ref 5–9)
PROT UR STRIP-MCNC: ABNORMAL MG/DL
SP GR UR STRIP: 1.03 (ref 1–1.03)
UROBILINOGEN UR STRIP-ACNC: 0.2 E.U./DL

## 2023-06-16 LAB — BACTERIA UR CULT: NORMAL

## 2023-06-17 LAB
SPECIMEN SOURCE: NORMAL
T VAGINALIS RRNA SPEC QL NAA+PROBE: NEGATIVE

## 2023-06-21 LAB
C TRACH DNA UR QL NAA+PROBE: NEGATIVE
N GONORRHOEA DNA UR QL NAA+PROBE: NEGATIVE

## 2023-08-08 ENCOUNTER — HOSPITAL ENCOUNTER (EMERGENCY)
Age: 23
Discharge: HOME | End: 2023-08-08
Attending: EMERGENCY MEDICINE
Payer: COMMERCIAL

## 2023-08-08 ENCOUNTER — APPOINTMENT (OUTPATIENT)
Dept: CT IMAGING | Age: 23
End: 2023-08-08
Payer: COMMERCIAL

## 2023-08-08 VITALS
DIASTOLIC BLOOD PRESSURE: 71 MMHG | TEMPERATURE: 97.4 F | OXYGEN SATURATION: 100 % | WEIGHT: 135 LBS | HEART RATE: 55 BPM | BODY MASS INDEX: 22.49 KG/M2 | SYSTOLIC BLOOD PRESSURE: 123 MMHG | RESPIRATION RATE: 16 BRPM | HEIGHT: 65 IN

## 2023-08-08 DIAGNOSIS — K52.9 COLITIS: ICD-10-CM

## 2023-08-08 DIAGNOSIS — A09 DIARRHEA OF INFECTIOUS ORIGIN: Primary | ICD-10-CM

## 2023-08-08 LAB
ALBUMIN SERPL-MCNC: 4.8 G/DL (ref 3.5–4.6)
ALP SERPL-CCNC: 57 U/L (ref 40–130)
ALT SERPL-CCNC: 6 U/L (ref 0–33)
ANION GAP SERPL CALCULATED.3IONS-SCNC: 8 MEQ/L (ref 9–15)
AST SERPL-CCNC: 13 U/L (ref 0–35)
BACTERIA URNS QL MICRO: ABNORMAL /HPF
BASOPHILS # BLD: 0 K/UL (ref 0–0.2)
BASOPHILS NFR BLD: 0.5 %
BILIRUB SERPL-MCNC: 0.6 MG/DL (ref 0.2–0.7)
BILIRUB UR QL STRIP: NEGATIVE
BUN SERPL-MCNC: 7 MG/DL (ref 6–20)
C DIFF TOX A+B STL QL IA: NORMAL
CALCIUM SERPL-MCNC: 9.8 MG/DL (ref 8.5–9.9)
CHLORIDE SERPL-SCNC: 104 MEQ/L (ref 95–107)
CLARITY UR: CLEAR
CO2 SERPL-SCNC: 26 MEQ/L (ref 20–31)
COLOR UR: YELLOW
CREAT SERPL-MCNC: 0.72 MG/DL (ref 0.5–0.9)
CRYSTALS URNS MICRO: ABNORMAL /HPF
EOSINOPHIL # BLD: 0.1 K/UL (ref 0–0.7)
EOSINOPHIL NFR BLD: 1.4 %
EPI CELLS #/AREA URNS AUTO: ABNORMAL /HPF (ref 0–5)
ERYTHROCYTE [DISTWIDTH] IN BLOOD BY AUTOMATED COUNT: 15.7 % (ref 11.5–14.5)
GLOBULIN SER CALC-MCNC: 2.6 G/DL (ref 2.3–3.5)
GLUCOSE SERPL-MCNC: 98 MG/DL (ref 70–99)
GLUCOSE UR STRIP-MCNC: NEGATIVE MG/DL
HCG UR QL: NEGATIVE
HCT VFR BLD AUTO: 39.5 % (ref 37–47)
HGB BLD-MCNC: 13 G/DL (ref 12–16)
HGB UR QL STRIP: NEGATIVE
HYALINE CASTS #/AREA URNS AUTO: ABNORMAL /HPF (ref 0–5)
KETONES UR STRIP-MCNC: 15 MG/DL
LEUKOCYTE ESTERASE UR QL STRIP: ABNORMAL
LIPASE SERPL-CCNC: 25 U/L (ref 12–95)
LYMPHOCYTES # BLD: 1.5 K/UL (ref 1–4.8)
LYMPHOCYTES NFR BLD: 28 %
MAGNESIUM SERPL-MCNC: 1.9 MG/DL (ref 1.7–2.4)
MCH RBC QN AUTO: 28.2 PG (ref 27–31.3)
MCHC RBC AUTO-ENTMCNC: 33 % (ref 33–37)
MCV RBC AUTO: 85.3 FL (ref 79.4–94.8)
MONOCYTES # BLD: 0.4 K/UL (ref 0.2–0.8)
MONOCYTES NFR BLD: 7.5 %
NEUTROPHILS # BLD: 3.3 K/UL (ref 1.4–6.5)
NEUTS SEG NFR BLD: 62.6 %
NITRITE UR QL STRIP: NEGATIVE
PH UR STRIP: 6 [PH] (ref 5–9)
PLATELET # BLD AUTO: 191 K/UL (ref 130–400)
POTASSIUM SERPL-SCNC: 3.4 MEQ/L (ref 3.4–4.9)
PROT SERPL-MCNC: 7.4 G/DL (ref 6.3–8)
PROT UR STRIP-MCNC: ABNORMAL MG/DL
RBC # BLD AUTO: 4.63 M/UL (ref 4.2–5.4)
RBC #/AREA URNS AUTO: ABNORMAL /HPF (ref 0–5)
SODIUM SERPL-SCNC: 138 MEQ/L (ref 135–144)
SP GR UR STRIP: 1.03 (ref 1–1.03)
T4 FREE SERPL-MCNC: 1.04 NG/DL (ref 0.84–1.68)
TSH SERPL-MCNC: 0.92 UIU/ML (ref 0.44–3.86)
UROBILINOGEN UR STRIP-ACNC: 0.2 E.U./DL
WBC # BLD AUTO: 5.3 K/UL (ref 4.8–10.8)
WBC #/AREA URNS AUTO: ABNORMAL /HPF (ref 0–5)

## 2023-08-08 PROCEDURE — 83735 ASSAY OF MAGNESIUM: CPT

## 2023-08-08 PROCEDURE — 87324 CLOSTRIDIUM AG IA: CPT

## 2023-08-08 PROCEDURE — 85025 COMPLETE CBC W/AUTO DIFF WBC: CPT

## 2023-08-08 PROCEDURE — 36415 COLL VENOUS BLD VENIPUNCTURE: CPT

## 2023-08-08 PROCEDURE — 83690 ASSAY OF LIPASE: CPT

## 2023-08-08 PROCEDURE — 99285 EMERGENCY DEPT VISIT HI MDM: CPT

## 2023-08-08 PROCEDURE — 74177 CT ABD & PELVIS W/CONTRAST: CPT

## 2023-08-08 PROCEDURE — 6360000004 HC RX CONTRAST MEDICATION: Performed by: EMERGENCY MEDICINE

## 2023-08-08 PROCEDURE — 80053 COMPREHEN METABOLIC PANEL: CPT

## 2023-08-08 PROCEDURE — 84439 ASSAY OF FREE THYROXINE: CPT

## 2023-08-08 PROCEDURE — 87449 NOS EACH ORGANISM AG IA: CPT

## 2023-08-08 PROCEDURE — 84443 ASSAY THYROID STIM HORMONE: CPT

## 2023-08-08 PROCEDURE — 81001 URINALYSIS AUTO W/SCOPE: CPT

## 2023-08-08 PROCEDURE — 2580000003 HC RX 258: Performed by: EMERGENCY MEDICINE

## 2023-08-08 PROCEDURE — 84703 CHORIONIC GONADOTROPIN ASSAY: CPT

## 2023-08-08 RX ORDER — CIPROFLOXACIN 500 MG/1
500 TABLET, FILM COATED ORAL 2 TIMES DAILY
Qty: 14 TABLET | Refills: 0 | Status: SHIPPED | OUTPATIENT
Start: 2023-08-08 | End: 2023-08-15

## 2023-08-08 RX ORDER — 0.9 % SODIUM CHLORIDE 0.9 %
1000 INTRAVENOUS SOLUTION INTRAVENOUS ONCE
Status: COMPLETED | OUTPATIENT
Start: 2023-08-08 | End: 2023-08-08

## 2023-08-08 RX ADMIN — IOPAMIDOL 50 ML: 755 INJECTION, SOLUTION INTRAVENOUS at 11:05

## 2023-08-08 RX ADMIN — SODIUM CHLORIDE 1000 ML: 9 INJECTION, SOLUTION INTRAVENOUS at 10:27

## 2023-08-08 ASSESSMENT — PAIN DESCRIPTION - LOCATION: LOCATION: ABDOMEN

## 2023-08-08 ASSESSMENT — PAIN - FUNCTIONAL ASSESSMENT: PAIN_FUNCTIONAL_ASSESSMENT: 0-10

## 2023-08-08 ASSESSMENT — PAIN DESCRIPTION - ONSET: ONSET: ON-GOING

## 2023-08-08 ASSESSMENT — ENCOUNTER SYMPTOMS
EYES NEGATIVE: 1
DIARRHEA: 1
ABDOMINAL PAIN: 1
ALLERGIC/IMMUNOLOGIC NEGATIVE: 1
NAUSEA: 1
RESPIRATORY NEGATIVE: 1

## 2023-08-08 ASSESSMENT — PAIN DESCRIPTION - PAIN TYPE: TYPE: ACUTE PAIN

## 2023-08-08 ASSESSMENT — PAIN DESCRIPTION - FREQUENCY: FREQUENCY: CONTINUOUS

## 2023-08-08 ASSESSMENT — PAIN SCALES - GENERAL: PAINLEVEL_OUTOF10: 8

## 2023-08-08 ASSESSMENT — PAIN DESCRIPTION - DESCRIPTORS: DESCRIPTORS: SHARP;CRAMPING

## 2023-08-08 ASSESSMENT — PAIN DESCRIPTION - ORIENTATION: ORIENTATION: MID;UPPER

## 2023-08-08 NOTE — ED PROVIDER NOTES
[facility] ED  eMERGENCY dEPARTMENT eNCOUnter      Pt Name: Robles Oh  MRN: [id number]  [address] [facility] [place] 2000  Date of evaluation: 8/8/2023  Provider: Bogdan Goldsmith MD    [address]       Chief Complaint   Patient presents with    Abdominal Pain     Diarrhea x1 week           HISTORY OF PRESENT ILLNESS   (Location/Symptom, Timing/Onset,Context/Setting, Quality, Duration, Modifying Factors, Severity)  Note limiting factors. Robles Oh is a 26 y.o. female who presents to the emergency department with 1 week history of intermittent abdominal cramping. Patient has volumes diarrhea. Patient has nausea but no vomiting. Patient states she has diarrhea multiple times a day. The only way she gets any relief is by sitting on the toilet and pushing and the pain seems to get better. Palpation makes it worse. Dull and achy cramping and sharp on occasion. Wakes her up out of her sleep. Patient denies any fever. Patient has no abdominal surgeries. Patient has no recent antibiotic use. Diarrhea is normal color. HPI    NursingNotes were reviewed. REVIEW OF SYSTEMS    (2-9 systems for level 4, 10 or more for level 5)     Review of Systems   Constitutional: Negative. HENT: Negative. Eyes: Negative. Respiratory: Negative. Gastrointestinal:  Positive for abdominal pain, diarrhea and nausea. Endocrine: Negative. Genitourinary: Negative. Musculoskeletal: Negative. Allergic/Immunologic: Negative. Neurological: Negative. Hematological: Negative. Except as noted above the remainder of the review of systems was reviewed and negative.        PAST MEDICAL HISTORY     Past Medical History:   Diagnosis Date    Anxiety 09/13/2017    Depression     History of herpes genitalis 12/1/2021         SURGICALHISTORY       Past Surgical History:   Procedure Laterality Date    ANKLE SURGERY      flat foot    DENTAL SURGERY  2008    DENTAL EXTRACTIONS    FOOT SURGERY  2012

## 2023-08-08 NOTE — ED TRIAGE NOTES
Pt c/o upper mid abd pain, states she is having multiple episodes of diarrhea with nausea for 1 week. Pt is A&Ox4, skin intact, afebrile, breaths are equal and unlabored.

## 2023-08-08 NOTE — ED NOTES
Pt came from home c/o diarrhea and abd pain for about 8 days  Pt states that she has [place] lot\" of diarrhea in the day  She did say that she isn't drinking/eating much because of the pain and fear of diarrhea  Pt has no real medical history  She states that ever since having her daughter (1 year ago) she has had abdominal pains but this is worse than normal.  She did disclose that she hemorrhaged during childbirth but was unsure if that is related at all.    Afebrile  VSS       Issac Paniagua RN  08/08/23 1007

## 2023-08-10 LAB
PERFORMED ON: NORMAL
POC CREATININE: 0.8 MG/DL (ref 0.6–1.2)
POC SAMPLE TYPE: NORMAL

## 2023-09-11 ENCOUNTER — TELEMEDICINE (OUTPATIENT)
Dept: FAMILY MEDICINE CLINIC | Age: 23
End: 2023-09-11
Payer: COMMERCIAL

## 2023-09-11 DIAGNOSIS — R19.7 DIARRHEA, UNSPECIFIED TYPE: Primary | ICD-10-CM

## 2023-09-11 PROCEDURE — G8427 DOCREV CUR MEDS BY ELIG CLIN: HCPCS | Performed by: NURSE PRACTITIONER

## 2023-09-11 PROCEDURE — 99214 OFFICE O/P EST MOD 30 MIN: CPT | Performed by: NURSE PRACTITIONER

## 2023-09-11 SDOH — ECONOMIC STABILITY: FOOD INSECURITY: WITHIN THE PAST 12 MONTHS, THE FOOD YOU BOUGHT JUST DIDN'T LAST AND YOU DIDN'T HAVE MONEY TO GET MORE.: NEVER TRUE

## 2023-09-11 SDOH — ECONOMIC STABILITY: FOOD INSECURITY: WITHIN THE PAST 12 MONTHS, YOU WORRIED THAT YOUR FOOD WOULD RUN OUT BEFORE YOU GOT MONEY TO BUY MORE.: NEVER TRUE

## 2023-09-11 SDOH — ECONOMIC STABILITY: INCOME INSECURITY: HOW HARD IS IT FOR YOU TO PAY FOR THE VERY BASICS LIKE FOOD, HOUSING, MEDICAL CARE, AND HEATING?: NOT HARD AT ALL

## 2023-09-11 SDOH — ECONOMIC STABILITY: HOUSING INSECURITY
IN THE LAST 12 MONTHS, WAS THERE A TIME WHEN YOU DID NOT HAVE A STEADY PLACE TO SLEEP OR SLEPT IN A SHELTER (INCLUDING NOW)?: NO

## 2023-09-11 ASSESSMENT — ENCOUNTER SYMPTOMS
NAUSEA: 1
COUGH: 0
DIARRHEA: 1
ABDOMINAL PAIN: 1
SHORTNESS OF BREATH: 0
VOMITING: 1

## 2023-09-11 NOTE — PROGRESS NOTES
Gar Neighbours, was evaluated through a synchronous (real-time) audio-video encounter. The patient (or guardian if applicable) is aware that this is a billable service, which includes applicable co-pays. This Virtual Visit was conducted with patient's (and/or legal guardian's) consent. Patient identification was verified, and a caregiver was present when appropriate. The patient was located at Home: [facility]  Provider was located at [facility] ([address]): [address] [facility], [address], [address]  [facility],  [address] (:  2000) is a Established patient, presenting virtually for evaluation of the following:    Assessment & Plan   Below is the assessment and plan developed based on review of pertinent history, physical exam, labs, studies, and medications. 1. Diarrhea, unspecified type  -     Gastrointestinal Panel by DNA; Future  -     Clostridium Difficile Toxin/Antigen; Future  -     Calprotectin Stool; Future  -     Teo Epps MD, Gastroenterology, [place]    No follow-ups on file. Subjective   HPI     1.5 mos pt was having bad abdominal pain. Significant diarrhea several times a day. Was given cipro and symptoms improved. Now a month later pt is starting to get diarrhea and abdominal discomfort daily  Caused nausea and emesis earlier this week. Did have abnormal CT a mos ago showing moderate wall thickening throughout the entire colon. Review of Systems   Constitutional:  Negative for fatigue. Respiratory:  Negative for cough and shortness of breath. Cardiovascular:  Negative for chest pain. Gastrointestinal:  Positive for abdominal pain, diarrhea, nausea and vomiting.         Objective   Patient-Reported Vitals  No data recorded     Physical Exam  [INSTRUCTIONS:  \"[x]\" Indicates a positive item  \"[]\" Indicates a negative item  -- DELETE ALL ITEMS NOT EXAMINED]    Constitutional: [x] Appears well-developed and well-nourished

## 2023-10-22 ENCOUNTER — HOSPITAL ENCOUNTER (EMERGENCY)
Age: 23
Discharge: HOME | End: 2023-10-22
Payer: COMMERCIAL

## 2023-10-22 VITALS
BODY MASS INDEX: 20.83 KG/M2 | HEART RATE: 80 BPM | TEMPERATURE: 98.6 F | DIASTOLIC BLOOD PRESSURE: 73 MMHG | RESPIRATION RATE: 17 BRPM | OXYGEN SATURATION: 99 % | SYSTOLIC BLOOD PRESSURE: 115 MMHG | WEIGHT: 125 LBS | HEIGHT: 65 IN

## 2023-10-22 DIAGNOSIS — K08.89 DENTALGIA: ICD-10-CM

## 2023-10-22 DIAGNOSIS — K02.9 DENTAL CARIES: Primary | ICD-10-CM

## 2023-10-22 PROCEDURE — 99283 EMERGENCY DEPT VISIT LOW MDM: CPT

## 2023-10-22 PROCEDURE — 6370000000 HC RX 637 (ALT 250 FOR IP): Performed by: PERSONAL EMERGENCY RESPONSE ATTENDANT

## 2023-10-22 RX ORDER — HYDROCODONE BITARTRATE AND ACETAMINOPHEN 5; 325 MG/1; MG/1
1 TABLET ORAL ONCE
Status: COMPLETED | OUTPATIENT
Start: 2023-10-22 | End: 2023-10-22

## 2023-10-22 RX ORDER — HYDROCODONE BITARTRATE AND ACETAMINOPHEN 5; 325 MG/1; MG/1
1 TABLET ORAL EVERY 6 HOURS PRN
Qty: 10 TABLET | Refills: 0 | Status: SHIPPED | OUTPATIENT
Start: 2023-10-22 | End: 2023-10-25

## 2023-10-22 RX ADMIN — HYDROCODONE BITARTRATE AND ACETAMINOPHEN 1 TABLET: 5; 325 TABLET ORAL at 20:58

## 2023-10-22 ASSESSMENT — PATIENT HEALTH QUESTIONNAIRE - PHQ9
1. LITTLE INTEREST OR PLEASURE IN DOING THINGS: 0
SUM OF ALL RESPONSES TO PHQ QUESTIONS 1-9: 0
SUM OF ALL RESPONSES TO PHQ QUESTIONS 1-9: 0
2. FEELING DOWN, DEPRESSED OR HOPELESS: 0
SUM OF ALL RESPONSES TO PHQ QUESTIONS 1-9: 0
SUM OF ALL RESPONSES TO PHQ QUESTIONS 1-9: 0
SUM OF ALL RESPONSES TO PHQ9 QUESTIONS 1 & 2: 0

## 2023-10-22 ASSESSMENT — PAIN SCALES - GENERAL: PAINLEVEL_OUTOF10: 9

## 2023-10-22 ASSESSMENT — ENCOUNTER SYMPTOMS
VOMITING: 0
DIARRHEA: 0
NAUSEA: 0
COLOR CHANGE: 0
COUGH: 0
SORE THROAT: 0
RHINORRHEA: 0
ABDOMINAL PAIN: 0
SHORTNESS OF BREATH: 0
BLOOD IN STOOL: 0

## 2023-10-22 ASSESSMENT — PAIN - FUNCTIONAL ASSESSMENT: PAIN_FUNCTIONAL_ASSESSMENT: 0-10

## 2023-10-22 ASSESSMENT — PAIN DESCRIPTION - LOCATION: LOCATION: TEETH

## 2023-10-22 ASSESSMENT — LIFESTYLE VARIABLES
HOW OFTEN DO YOU HAVE A DRINK CONTAINING ALCOHOL: MONTHLY OR LESS
HOW MANY STANDARD DRINKS CONTAINING ALCOHOL DO YOU HAVE ON A TYPICAL DAY: 1 OR 2

## 2023-10-22 ASSESSMENT — PAIN DESCRIPTION - ORIENTATION: ORIENTATION: LEFT;UPPER

## 2023-10-23 NOTE — ED PROVIDER NOTES
[facility] ED  eMERGENCY dEPARTMENT eNCOUnter      Pt Name: Suzie Mccain  MRN: [id number]  [address] [facility] 2000  Date of evaluation: 10/22/2023  Provider: LEX Valderrama    My attending is Dr. Rosanna Colindres is a 26 y.o. female with PMHx of anxiety, depression, herpes genitalis presents to the emergency department with dental pain. Patient has a root canal in 4 days to left upper tooth. Finished amoxicillin yesterday but pain is still present. Taking Motrin and numbing gel at home with minimal improvement. She denies fevers. HPI    Nursing Notes were reviewed. REVIEW OF SYSTEMS       Review of Systems   Constitutional:  Negative for appetite change, chills and fever. HENT:  Positive for dental problem. Negative for congestion, rhinorrhea and sore throat. Respiratory:  Negative for cough and shortness of breath. Cardiovascular:  Negative for chest pain. Gastrointestinal:  Negative for abdominal pain, blood in stool, diarrhea, nausea and vomiting. Genitourinary:  Negative for difficulty urinating. Musculoskeletal:  Negative for neck stiffness. Skin:  Negative for color change and rash. Neurological:  Negative for dizziness, syncope, weakness, light-headedness, numbness and headaches. All other systems reviewed and are negative.             PAST MEDICAL HISTORY     Past Medical History:   Diagnosis Date    Anxiety 09/13/2017    Depression     History of herpes genitalis 12/1/2021         SURGICAL HISTORY       Past Surgical History:   Procedure Laterality Date    ANKLE SURGERY      flat foot    DENTAL SURGERY  2008    DENTAL EXTRACTIONS    FOOT SURGERY  2012    for flat feet    TONSILLECTOMY AND ADENOIDECTOMY N/A 12/14/2017    TONSILLECTOMY  AND ADENOIDECTOMY performed by John Brothers MD at [address] [place] [address]       Previous Medications    IBUPROFEN (ADVIL;MOTRIN) 800 MG TABLET    Take 1 tablet by mouth every

## 2023-10-23 NOTE — ED TRIAGE NOTES
Left upper tooth. Having root canal Thursday. Was on ATB and finished yesterday. Amoxicillin.  Pain just too much

## 2023-10-23 NOTE — DISCHARGE INSTRUCTIONS
You can alternate Motrin and Tylenol at home every 3-4 hours for pain control  Use hydrocodone/Norco as your Tylenol dose  Do not take more than 3000 mg of Tylenol a day

## 2024-02-08 ENCOUNTER — OFFICE VISIT (OUTPATIENT)
Dept: OBGYN CLINIC | Age: 24
End: 2024-02-08

## 2024-02-08 VITALS
HEART RATE: 75 BPM | SYSTOLIC BLOOD PRESSURE: 98 MMHG | DIASTOLIC BLOOD PRESSURE: 64 MMHG | WEIGHT: 111 LBS | BODY MASS INDEX: 18.47 KG/M2

## 2024-02-08 DIAGNOSIS — Z34.90 EARLY STAGE OF PREGNANCY: ICD-10-CM

## 2024-02-08 DIAGNOSIS — Z11.51 SCREENING FOR HUMAN PAPILLOMAVIRUS: ICD-10-CM

## 2024-02-08 DIAGNOSIS — Z32.01 POSITIVE URINE PREGNANCY TEST: ICD-10-CM

## 2024-02-08 DIAGNOSIS — O21.9 NAUSEA AND VOMITING IN PREGNANCY: ICD-10-CM

## 2024-02-08 DIAGNOSIS — F19.10 SUBSTANCE ABUSE (HCC): ICD-10-CM

## 2024-02-08 DIAGNOSIS — Z72.51 UNPROTECTED SEXUAL INTERCOURSE: ICD-10-CM

## 2024-02-08 DIAGNOSIS — N91.1 SECONDARY AMENORRHEA: Primary | ICD-10-CM

## 2024-02-08 PROBLEM — O98.512 HERPES SIMPLEX INFECTION IN MOTHER DURING SECOND TRIMESTER OF PREGNANCY: Status: RESOLVED | Noted: 2022-03-24 | Resolved: 2024-02-08

## 2024-02-08 PROBLEM — F41.9 ANXIETY: Status: RESOLVED | Noted: 2017-09-13 | Resolved: 2024-02-08

## 2024-02-08 PROBLEM — O09.299 HISTORY OF POSTPARTUM HEMORRHAGE, CURRENTLY PREGNANT: Status: ACTIVE | Noted: 2024-02-08

## 2024-02-08 PROBLEM — B00.9 HERPES SIMPLEX INFECTION IN MOTHER DURING SECOND TRIMESTER OF PREGNANCY: Status: RESOLVED | Noted: 2022-03-24 | Resolved: 2024-02-08

## 2024-02-08 PROBLEM — F33.0 MILD EPISODE OF RECURRENT MAJOR DEPRESSIVE DISORDER (HCC): Status: RESOLVED | Noted: 2021-07-13 | Resolved: 2024-02-08

## 2024-02-08 PROBLEM — Z78.9 ADMITTED TO LABOR AND DELIVERY: Status: RESOLVED | Noted: 2022-07-08 | Resolved: 2024-02-08

## 2024-02-08 LAB
BASOPHILS # BLD: 0 K/UL (ref 0–0.2)
BASOPHILS NFR BLD: 0.3 %
CONTROL: NORMAL
EOSINOPHIL # BLD: 0 K/UL (ref 0–0.7)
EOSINOPHIL NFR BLD: 0.3 %
ERYTHROCYTE [DISTWIDTH] IN BLOOD BY AUTOMATED COUNT: 14.1 % (ref 11.5–14.5)
GONADOTROPIN, CHORIONIC (HCG) QUANT: NORMAL MIU/ML
HBV SURFACE AG SERPL QL IA: NORMAL
HCT VFR BLD AUTO: 36.4 % (ref 37–47)
HGB BLD-MCNC: 12 G/DL (ref 12–16)
LYMPHOCYTES # BLD: 2 K/UL (ref 1–4.8)
LYMPHOCYTES NFR BLD: 20.3 %
MCH RBC QN AUTO: 28 PG (ref 27–31.3)
MCHC RBC AUTO-ENTMCNC: 33 % (ref 33–37)
MCV RBC AUTO: 85 FL (ref 79.4–94.8)
MONOCYTES # BLD: 0.6 K/UL (ref 0.2–0.8)
MONOCYTES NFR BLD: 5.8 %
NEUTROPHILS # BLD: 7.1 K/UL (ref 1.4–6.5)
NEUTS SEG NFR BLD: 73.1 %
PLATELET # BLD AUTO: 244 K/UL (ref 130–400)
PREGNANCY TEST URINE, POC: POSITIVE
RBC # BLD AUTO: 4.28 M/UL (ref 4.2–5.4)
RUBELLA ANTIBODY IGG: 216.4 IU/ML
TSH REFLEX: 0.98 UIU/ML (ref 0.44–3.86)
WBC # BLD AUTO: 9.7 K/UL (ref 4.8–10.8)

## 2024-02-08 RX ORDER — ONDANSETRON 4 MG/1
TABLET, ORALLY DISINTEGRATING ORAL
Qty: 30 TABLET | Refills: 2 | Status: SHIPPED | OUTPATIENT
Start: 2024-02-08

## 2024-02-08 RX ORDER — VITAMIN A ACETATE, .BETA.-CAROTENE, ASCORBIC ACID, CHOLECALCIFEROL, .ALPHA.-TOCOPHEROL ACETATE, DL-, THIAMINE MONONITRATE, RIBOFLAVIN, NIACINAMIDE, PYRIDOXINE HYDROCHLORIDE, FOLIC ACID, CYANOCOBALAMIN, CALCIUM CARBONATE, FERROUS FUMARATE, ZINC OXIDE, CUPRIC OXIDE 9.9; 120; 920; 200; 400; 2; 12; 27; 1; 20; 10; 3; 1.84; 3080; 25 MG/1; MG/1; [IU]/1; MG/1; [IU]/1; MG/1; UG/1; MG/1; MG/1; MG/1; MG/1; MG/1; MG/1; [IU]/1; MG/1
1 TABLET, FILM COATED ORAL DAILY
Qty: 90 TABLET | Refills: 3 | Status: SHIPPED | OUTPATIENT
Start: 2024-02-08 | End: 2025-02-07

## 2024-02-08 ASSESSMENT — ENCOUNTER SYMPTOMS
CONSTIPATION: 0
VOMITING: 1
DIARRHEA: 0
NAUSEA: 1
SHORTNESS OF BREATH: 0
ABDOMINAL PAIN: 0

## 2024-02-08 NOTE — PROGRESS NOTES
SUBJECTIVE:  Fernanda Knapp is a 23 y.o. female who presents here today for complaints of:      Chief Complaint   Patient presents with    Amenorrhea     Confirmation of pregnancy       Amenorrhea, Confirmation of Pregnancy  Home Pregnancy Test:  Positive  Patient's last menstrual period was 12/11/2023 (within days).  Pregnancy Symptoms:  Missed Period, Tender Breasts, Nausea & Vomiting, Urinary Frequency, Fatigue  Pelvic pain/cramping:   No  Vaginal bleeding: No    Gynecological History  Prior Pap History:    No prior pap testing    Review of Systems   Constitutional:  Positive for appetite change and fatigue.   Eyes:  Negative for visual disturbance.   Respiratory:  Negative for shortness of breath.    Gastrointestinal:  Positive for nausea and vomiting. Negative for abdominal pain, constipation and diarrhea.   Genitourinary:  Positive for frequency. Negative for dysuria, pelvic pain, vaginal bleeding and vaginal discharge.   Neurological:  Negative for dizziness, syncope and headaches.     OBJECTIVE:  Vitals:  BP 98/64   Pulse 75   Wt 50.3 kg (111 lb)   LMP 12/11/2023 (Within Days)   BMI 18.47 kg/m²     Urine Pregnancy Test: Positive  Fundal height - Size equals dates  Fetal heart tones - N/A    Physical Exam  Vitals and nursing note reviewed.   Constitutional:       General: She is not in acute distress.     Appearance: Normal appearance. She is not ill-appearing, toxic-appearing or diaphoretic.   HENT:      Head: Normocephalic.      Nose: No congestion or rhinorrhea.      Mouth/Throat:      Mouth: Mucous membranes are moist.   Eyes:      General: No scleral icterus.        Right eye: No discharge.         Left eye: No discharge.   Cardiovascular:      Rate and Rhythm: Normal rate and regular rhythm.      Pulses: Normal pulses.   Pulmonary:      Effort: Pulmonary effort is normal. No respiratory distress.   Abdominal:      Palpations: Abdomen is soft.      Hernia: There is no hernia in the left inguinal

## 2024-02-09 LAB
ABO + RH BLD: NORMAL
BLD GP AB SCN SERPL QL: NORMAL
CLUE CELLS VAG QL WET PREP: ABNORMAL
HEPATITIS C ANTIBODY: NONREACTIVE
HIV AG/AB: NONREACTIVE
RPR SER QL: NORMAL
T VAGINALIS VAG QL WET PREP: ABNORMAL
TRICHOMONAS VAGINALIS SCREEN: NEGATIVE
YEAST VAG QL WET PREP: ABNORMAL

## 2024-02-09 NOTE — RESULT ENCOUNTER NOTE
Wet Prep:  Clue Cells  Rx:  Flagyl 500m tablet BID x7 days  Pharmacy:    [facility]/pharmacy #[id number] - [place], [place] - [address] - P [phone] - F [phone]  [facility]  [facility] [address]  Phone: [phone] Fax: [phone]    Treat when closer to 12 weeks

## 2024-02-10 LAB — BACTERIA UR CULT: NORMAL

## 2024-02-11 LAB
6MAM UR QL: NOT DETECTED
7-AMINOCLONAZEPAM: NOT DETECTED
ALPHA-OH-ALPRAZOLAM: NOT DETECTED
ALPHA-OH-MIDAZOLAM, URINE: NOT DETECTED
ALPRAZOLAM: NOT DETECTED
AMPHET UR QL SCN: NOT DETECTED
BARBITURATES: NEGATIVE
BENZOYLECGONINE: NEGATIVE
BUPRENORPHINE: NOT DETECTED
CARISOPRODOL UR QL: NEGATIVE
CLONAZEPAM UR QL: NOT DETECTED
CODEINE: NOT DETECTED
CREAT UR-MCNC: 263.6 MG/DL (ref 20–400)
DIAZEPAM: NOT DETECTED
ETHYL GLUCURONIDE: NEGATIVE
FENTANYL UR QL: NOT DETECTED
GABAPENTIN: NOT DETECTED
HYDROCODONE UR QL: NOT DETECTED
HYDROMORPHONE: NOT DETECTED
LORAZEPAM UR QL: NOT DETECTED
MARIJUANA METABOLITE: NORMAL
MDA: NOT DETECTED
MDEA: NOT DETECTED
MDMA UR QL: NOT DETECTED
MEPERIDINE: NOT DETECTED
METHADONE: NEGATIVE
METHAMPHETAMINE: NOT DETECTED
METHYLPHENIDATE: NOT DETECTED
MIDAZOLAM UR QL SCN: NOT DETECTED
MORPHINE: NOT DETECTED
NALOXONE: NOT DETECTED
NORBUPRENORPHINE, FREE: NOT DETECTED
NORDIAZEPAM: NOT DETECTED
NORFENTANYL: NOT DETECTED
NORHYDROCODONE, URINE: NOT DETECTED
NOROXYCODONE: NOT DETECTED
NOROXYMORPHONE, URINE: NOT DETECTED
OXAZEPAM UR QL: NOT DETECTED
OXYCODONE UR QL: NOT DETECTED
OXYMORPHONE UR QL: NOT DETECTED
PAIN MANAGEMENT DRUG PANEL: NORMAL
PATHOLOGY STUDY: NORMAL
PCP: NEGATIVE
PHENTERMINE: NOT DETECTED
PREGABALIN: NOT DETECTED
TAPENTADOL, URINE: NOT DETECTED
TAPENTADOL-O-SULFATE, URINE: NOT DETECTED
TEMAZEPAM: NOT DETECTED
TRAMADOL: NEGATIVE
VZV IGG SER IA-ACNC: 72.4 IV
ZOLPIDEM: NOT DETECTED

## 2024-02-12 PROBLEM — O99.320 MARIJUANA USE DURING PREGNANCY: Status: ACTIVE | Noted: 2024-02-12

## 2024-02-12 PROBLEM — F12.90 MARIJUANA USE DURING PREGNANCY: Status: ACTIVE | Noted: 2024-02-12

## 2024-02-13 ENCOUNTER — HOSPITAL ENCOUNTER (OUTPATIENT)
Dept: ULTRASOUND IMAGING | Age: 24
Discharge: HOME | End: 2024-02-15
Attending: ADVANCED PRACTICE MIDWIFE
Payer: COMMERCIAL

## 2024-02-13 DIAGNOSIS — Z34.90 EARLY STAGE OF PREGNANCY: ICD-10-CM

## 2024-02-13 PROCEDURE — 76801 OB US < 14 WKS SINGLE FETUS: CPT

## 2024-02-15 ENCOUNTER — HOSPITAL ENCOUNTER (EMERGENCY)
Age: 24
Discharge: HOME | End: 2024-02-15
Attending: EMERGENCY MEDICINE
Payer: COMMERCIAL

## 2024-02-15 ENCOUNTER — TELEPHONE (OUTPATIENT)
Dept: OBGYN CLINIC | Age: 24
End: 2024-02-15

## 2024-02-15 VITALS
OXYGEN SATURATION: 99 % | BODY MASS INDEX: 18.66 KG/M2 | HEIGHT: 65 IN | TEMPERATURE: 98 F | WEIGHT: 112 LBS | SYSTOLIC BLOOD PRESSURE: 97 MMHG | RESPIRATION RATE: 19 BRPM | HEART RATE: 78 BPM | DIASTOLIC BLOOD PRESSURE: 57 MMHG

## 2024-02-15 DIAGNOSIS — B34.9 VIRAL SYNDROME: ICD-10-CM

## 2024-02-15 DIAGNOSIS — E86.0 DEHYDRATION: Primary | ICD-10-CM

## 2024-02-15 DIAGNOSIS — J11.1 INFLUENZA: Primary | ICD-10-CM

## 2024-02-15 LAB
ALBUMIN SERPL-MCNC: 4.3 G/DL (ref 3.5–4.6)
ALP SERPL-CCNC: 43 U/L (ref 40–130)
ALT SERPL-CCNC: <5 U/L (ref 0–33)
ANION GAP SERPL CALCULATED.3IONS-SCNC: 11 MEQ/L (ref 9–15)
AST SERPL-CCNC: 10 U/L (ref 0–35)
BASOPHILS # BLD: 0 K/UL (ref 0–0.2)
BASOPHILS NFR BLD: 0.3 %
BILIRUB SERPL-MCNC: 0.5 MG/DL (ref 0.2–0.7)
BUN SERPL-MCNC: 3 MG/DL (ref 6–20)
CALCIUM SERPL-MCNC: 9.3 MG/DL (ref 8.5–9.9)
CHLORIDE SERPL-SCNC: 105 MEQ/L (ref 95–107)
CO2 SERPL-SCNC: 24 MEQ/L (ref 20–31)
CREAT SERPL-MCNC: 0.48 MG/DL (ref 0.5–0.9)
EOSINOPHIL # BLD: 0 K/UL (ref 0–0.7)
EOSINOPHIL NFR BLD: 0.6 %
ERYTHROCYTE [DISTWIDTH] IN BLOOD BY AUTOMATED COUNT: 14 % (ref 11.5–14.5)
GLOBULIN SER CALC-MCNC: 2.5 G/DL (ref 2.3–3.5)
GLUCOSE SERPL-MCNC: 91 MG/DL (ref 70–99)
HCT VFR BLD AUTO: 37.5 % (ref 37–47)
HGB BLD-MCNC: 12.2 G/DL (ref 12–16)
HPV HR 12 DNA SPEC QL NAA+PROBE: NOT DETECTED
HPV16 DNA SPEC QL NAA+PROBE: NOT DETECTED
HPV16+18+H RISK 12 DNA SPEC-IMP: NORMAL
HPV18 DNA SPEC QL NAA+PROBE: NOT DETECTED
INFLUENZA A BY PCR: NEGATIVE
INFLUENZA B BY PCR: NEGATIVE
LYMPHOCYTES # BLD: 1.1 K/UL (ref 1–4.8)
LYMPHOCYTES NFR BLD: 15.9 %
MCH RBC QN AUTO: 28.2 PG (ref 27–31.3)
MCHC RBC AUTO-ENTMCNC: 32.5 % (ref 33–37)
MCV RBC AUTO: 86.8 FL (ref 79.4–94.8)
MONOCYTES # BLD: 0.6 K/UL (ref 0.2–0.8)
MONOCYTES NFR BLD: 9.1 %
N GONORRHOEA DNA SPEC QL NAA+PROBE: NEGATIVE
NEUTROPHILS # BLD: 5 K/UL (ref 1.4–6.5)
NEUTS SEG NFR BLD: 73.8 %
PLATELET # BLD AUTO: 166 K/UL (ref 130–400)
POTASSIUM SERPL-SCNC: 4.8 MEQ/L (ref 3.4–4.9)
PROT SERPL-MCNC: 6.8 G/DL (ref 6.3–8)
RBC # BLD AUTO: 4.32 M/UL (ref 4.2–5.4)
SARS-COV-2 RDRP RESP QL NAA+PROBE: NOT DETECTED
SODIUM SERPL-SCNC: 140 MEQ/L (ref 135–144)
WBC # BLD AUTO: 6.7 K/UL (ref 4.8–10.8)

## 2024-02-15 PROCEDURE — 99284 EMERGENCY DEPT VISIT MOD MDM: CPT

## 2024-02-15 PROCEDURE — 87502 INFLUENZA DNA AMP PROBE: CPT

## 2024-02-15 PROCEDURE — 96361 HYDRATE IV INFUSION ADD-ON: CPT

## 2024-02-15 PROCEDURE — 87635 SARS-COV-2 COVID-19 AMP PRB: CPT

## 2024-02-15 PROCEDURE — 80053 COMPREHEN METABOLIC PANEL: CPT

## 2024-02-15 PROCEDURE — 2580000003 HC RX 258: Performed by: EMERGENCY MEDICINE

## 2024-02-15 PROCEDURE — 6360000002 HC RX W HCPCS: Performed by: EMERGENCY MEDICINE

## 2024-02-15 PROCEDURE — 85025 COMPLETE CBC W/AUTO DIFF WBC: CPT

## 2024-02-15 PROCEDURE — 36415 COLL VENOUS BLD VENIPUNCTURE: CPT

## 2024-02-15 PROCEDURE — 96374 THER/PROPH/DIAG INJ IV PUSH: CPT

## 2024-02-15 RX ORDER — 0.9 % SODIUM CHLORIDE 0.9 %
1000 INTRAVENOUS SOLUTION INTRAVENOUS ONCE
Status: COMPLETED | OUTPATIENT
Start: 2024-02-15 | End: 2024-02-15

## 2024-02-15 RX ORDER — ONDANSETRON 2 MG/ML
4 INJECTION INTRAMUSCULAR; INTRAVENOUS ONCE
Status: COMPLETED | OUTPATIENT
Start: 2024-02-15 | End: 2024-02-15

## 2024-02-15 RX ORDER — ONDANSETRON 4 MG/1
4 TABLET, ORALLY DISINTEGRATING ORAL 3 TIMES DAILY PRN
Qty: 21 TABLET | Refills: 0 | Status: SHIPPED | OUTPATIENT
Start: 2024-02-15

## 2024-02-15 RX ORDER — METHYLPREDNISOLONE 4 MG/1
TABLET ORAL
Qty: 1 KIT | Refills: 0 | Status: SHIPPED | OUTPATIENT
Start: 2024-02-15 | End: 2024-02-21

## 2024-02-15 RX ORDER — OSELTAMIVIR PHOSPHATE 75 MG/1
75 CAPSULE ORAL 2 TIMES DAILY
Qty: 10 CAPSULE | Refills: 0 | Status: SHIPPED | OUTPATIENT
Start: 2024-02-15 | End: 2024-02-20

## 2024-02-15 RX ADMIN — SODIUM CHLORIDE 1000 ML: 9 INJECTION, SOLUTION INTRAVENOUS at 18:30

## 2024-02-15 RX ADMIN — ONDANSETRON 4 MG: 2 INJECTION INTRAMUSCULAR; INTRAVENOUS at 18:30

## 2024-02-15 NOTE — TELEPHONE ENCOUNTER
Pharmacy [facility].    Pt calling inc/o of being very fatigued and weak. Also has some stuffiness. She thinks its the flu. No nausea or vomiting     Per Kaykay Kruger CNM verbal pt was advised rx would be sent to the pharmacy.

## 2024-02-15 NOTE — ED TRIAGE NOTES
Patient arrived via private car due to flu exposure on Sunday. Patient has increased fatigue, cough. Patient called her OBGYN today and scripts have been sent but she has not picked them up

## 2024-02-15 NOTE — ED PROVIDER NOTES
Basic Information   Time Seen: 5:08 PM   Primary Care Provider: Regine Boyer, APRN - CNP     Chief Complaint   Patient presents with    general illness     Flu exposure, fatigue, sore throat, nausea.      HPI   Fernanda Knapp is a 23 yrs female who presents with fatigue, weakness, cough, nausea, vomiting.  Patient states flu exposure on Sunday.  Symptoms seem to start around then.  Patient is approximately 9 weeks pregnant.  Called her OB who called her in a Medrol Dosepak and Tamiflu.  Patient has not started these yet.  Patient states nausea vomiting seems consistent with what she has been experiencing throughout her entire trimester.  Denies abdominal or flank pain, no pelvic pain, no abnormal vaginal bleeding or vaginal discharge, no dysuria hematuria urinary urgency or urinary urgency.  No fevers.  No complications with this pregnancy.  G2, P1 female.  No complications with prior pregnancy.      Physical Exam     /67 (02/15/24 1637)    Temp 98.2 °F (36.8 °C) (02/15/24 1637)    Pulse 85 (02/15/24 1637)   Resp 16 (02/15/24 1637)    SpO2 100 % (02/15/24 1637)       General: Awake and Alert, no acute distress.  Nontoxic   CV: RRR, S1, S2   Resp: LCTAB, even and non labored   Other: Soft abdomen.  No guarding.  No distention.  No tenderness.    Impression and Plan     Labs Reviewed   RAPID INFLUENZA A/B ANTIGENS   COVID-19, RAPID        No orders to display      Final Impression   I have performed a medical screening exam on Fernanda Knapp. Based on this patient's chief complaint/symptoms of   Chief Complaint   Patient presents with    general illness     Flu exposure, fatigue, sore throat, nausea.    and my focused exam, their care will be started and transitioned to provider when room is available       Leticia Dewitt PA  02/15/24 6219    
Currently     Types: Marijuana (Weed)     Comment: 9/25/2021 xanax possibly laced with fentanyl and heroin    Sexual activity: Yes     Partners: Male     Comment: N/A     Social Determinants of Health     Financial Resource Strain: Low Risk  (9/11/2023)    Overall Financial Resource Strain (CARDIA)     Difficulty of Paying Living Expenses: Not hard at all   Transportation Needs: Unknown (9/11/2023)    PRAPARE - Transportation     Lack of Transportation (Non-Medical): No   Housing Stability: Unknown (9/11/2023)    Housing Stability Vital Sign     Unstable Housing in the Last Year: No       SCREENINGS      @FLOW([id number])@      PHYSICAL EXAM    (up to 7 for level 4, 8 or more for level 5)     ED Triage Vitals [02/15/24 1637]   BP Temp Temp Source Pulse Respirations SpO2 Height Weight - Scale   101/67 98.2 °F (36.8 °C) Oral 85 16 100 % 1.651 m (5' 5\") 50.8 kg (112 lb)       Physical Exam  Vitals and nursing note reviewed.   Constitutional:       Appearance: She is well-developed.   HENT:      Head: Normocephalic.      Nose: Nose normal.      Mouth/Throat:      Mouth: Mucous membranes are moist.   Eyes:      Conjunctiva/sclera: Conjunctivae normal.      Pupils: Pupils are equal, round, and reactive to light.   Cardiovascular:      Rate and Rhythm: Normal rate and regular rhythm.      Heart sounds: Normal heart sounds.   Pulmonary:      Effort: Pulmonary effort is normal.      Breath sounds: Normal breath sounds.   Abdominal:      General: Bowel sounds are normal.      Palpations: Abdomen is soft.      Tenderness: There is no abdominal tenderness. There is no guarding.   Musculoskeletal:         General: Normal range of motion.      Cervical back: Normal range of motion and neck supple.   Skin:     General: Skin is warm and dry.      Capillary Refill: Capillary refill takes less than 2 seconds.   Neurological:      General: No focal deficit present.      Mental Status: She is alert and oriented to person, place, and

## 2024-02-22 ENCOUNTER — INITIAL PRENATAL (OUTPATIENT)
Dept: OBGYN CLINIC | Age: 24
End: 2024-02-22
Payer: COMMERCIAL

## 2024-02-22 VITALS
DIASTOLIC BLOOD PRESSURE: 72 MMHG | SYSTOLIC BLOOD PRESSURE: 98 MMHG | HEART RATE: 85 BPM | WEIGHT: 110 LBS | BODY MASS INDEX: 18.3 KG/M2

## 2024-02-22 DIAGNOSIS — Z3A.10 10 WEEKS GESTATION OF PREGNANCY: ICD-10-CM

## 2024-02-22 DIAGNOSIS — Z34.91 INITIAL OBSTETRIC VISIT IN FIRST TRIMESTER: Primary | ICD-10-CM

## 2024-02-22 DIAGNOSIS — Z36.0 ENCOUNTER FOR ANTENATAL SCREENING FOR CHROMOSOMAL ANOMALIES: ICD-10-CM

## 2024-02-22 DIAGNOSIS — N76.0 BACTERIAL VAGINITIS: ICD-10-CM

## 2024-02-22 DIAGNOSIS — B96.89 BACTERIAL VAGINITIS: ICD-10-CM

## 2024-02-22 DIAGNOSIS — O21.9 NAUSEA AND VOMITING IN PREGNANCY: ICD-10-CM

## 2024-02-22 PROCEDURE — G8419 CALC BMI OUT NRM PARAM NOF/U: HCPCS | Performed by: ADVANCED PRACTICE MIDWIFE

## 2024-02-22 PROCEDURE — 4004F PT TOBACCO SCREEN RCVD TLK: CPT | Performed by: ADVANCED PRACTICE MIDWIFE

## 2024-02-22 PROCEDURE — 99214 OFFICE O/P EST MOD 30 MIN: CPT | Performed by: ADVANCED PRACTICE MIDWIFE

## 2024-02-22 PROCEDURE — G8484 FLU IMMUNIZE NO ADMIN: HCPCS | Performed by: ADVANCED PRACTICE MIDWIFE

## 2024-02-22 PROCEDURE — G8427 DOCREV CUR MEDS BY ELIG CLIN: HCPCS | Performed by: ADVANCED PRACTICE MIDWIFE

## 2024-02-22 RX ORDER — PROMETHAZINE HYDROCHLORIDE 25 MG/1
TABLET ORAL
Qty: 60 TABLET | Refills: 1 | Status: SHIPPED | OUTPATIENT
Start: 2024-02-22

## 2024-02-22 RX ORDER — METRONIDAZOLE 500 MG/1
500 TABLET ORAL 2 TIMES DAILY
Qty: 14 TABLET | Refills: 1 | Status: SHIPPED | OUTPATIENT
Start: 2024-02-22 | End: 2024-05-22

## 2024-02-22 ASSESSMENT — ENCOUNTER SYMPTOMS
SHORTNESS OF BREATH: 0
DIARRHEA: 0
NAUSEA: 1
ABDOMINAL PAIN: 0
VOMITING: 1
CONSTIPATION: 0

## 2024-02-22 NOTE — PROGRESS NOTES
Chief Complaint:     Maryjane Knapp is a 23 y.o. female who presents here today for complaints of:      Chief Complaint   Patient presents with    Initial Prenatal Visit       History of Present Illness:     Maryjane Knapp is a 23 y.o. female who presents for her Initial Prenatal Visit.  Concerns Today:  Continued nausea, Zofran is not quite helping    Past Medical, Surgical, Family, and Social History:     Allergies:  Patient has no known allergies.  Patient's last menstrual period was 2023 (within days).  OB History    Para Term  AB Living   2 1 1 0 0 1   SAB IAB Ectopic Molar Multiple Live Births   0 0 0 0 0 1      # Outcome Date GA Lbr Ash/2nd Weight Sex Delivery Anes PTL Lv   2 Current            1 Term 24 39w4d  4.009 kg (8 lb 13.4 oz) F Vag-Spont EPI N QUINTEN      Complications: Hemorrhage      Name: CORNELIOBABY GIRL MARYJANE      Apgar1: 8  Apgar5: 9       Past Medical History:   Diagnosis Date    Anxiety 2017    Depression 2017    History of herpes genitalis 2021     Past Surgical History:   Procedure Laterality Date    DENTAL SURGERY      DENTAL EXTRACTIONS    FOOT SURGERY Bilateral     for flat feet    TONSILLECTOMY AND ADENOIDECTOMY N/A 2017    TONSILLECTOMY  AND ADENOIDECTOMY performed by Asad Yun MD at [facility] OR    WISDOM TOOTH EXTRACTION Bilateral      OB Genetic Screening    Patient's Age 35+ at Date of Delivery No     Cystic Fibrosis No     Thalassemia MCV<80 No     [place] Chorea No     Neural Tube Defect No     Mental Retardation/Autism No     Congenital Heart Defect Yes brother has hole in heart/FOB heart arrythmia    Was Person Treated for Fragilex? No     Down Syndrome No     Other Inherited Genetic Chromosomal Disorder? No     Aguila-Sachs No     Maternal Metabolic Disorder No     Canavan Disease No     Patient or Baby's Father Had Other Defects? No     Familial dysautonomia No     Recurrent Pregnancy Loss or Still

## 2024-03-21 ENCOUNTER — ROUTINE PRENATAL (OUTPATIENT)
Dept: OBGYN CLINIC | Age: 24
End: 2024-03-21

## 2024-03-21 VITALS
SYSTOLIC BLOOD PRESSURE: 104 MMHG | WEIGHT: 118 LBS | BODY MASS INDEX: 19.64 KG/M2 | HEART RATE: 41 BPM | DIASTOLIC BLOOD PRESSURE: 66 MMHG

## 2024-03-21 DIAGNOSIS — Z36.89 SCREENING, ANTENATAL, FOR FETAL ANATOMIC SURVEY: ICD-10-CM

## 2024-03-21 DIAGNOSIS — Z36.86 ENCOUNTER FOR ANTENATAL SCREENING FOR CERVICAL LENGTH: ICD-10-CM

## 2024-03-21 DIAGNOSIS — Z3A.14 14 WEEKS GESTATION OF PREGNANCY: ICD-10-CM

## 2024-03-21 DIAGNOSIS — Z34.92 PRENATAL CARE, SECOND TRIMESTER: Primary | ICD-10-CM

## 2024-03-22 ASSESSMENT — ENCOUNTER SYMPTOMS
SHORTNESS OF BREATH: 0
NAUSEA: 1
CONSTIPATION: 1
VOMITING: 0
DIARRHEA: 0
ABDOMINAL PAIN: 0

## 2024-03-22 NOTE — PROGRESS NOTES
SUBJECTIVE:  Denies bleeding, spotting, leaking of fluid, abnormal discharge.  No fetal movement yet.  Feeling much better, N/V has significantly improved.  Still having some constipation, but also improving now that she is taking less Zofran.    Discussed scheduling her anatomy US at about 20 weeks - on/after 24.    Review of Systems   Eyes:  Negative for visual disturbance.   Respiratory:  Negative for shortness of breath.    Gastrointestinal:  Positive for constipation and nausea. Negative for abdominal pain, diarrhea and vomiting.   Genitourinary:  Negative for dysuria, vaginal bleeding and vaginal discharge.   Neurological:  Negative for headaches.     OBJECTIVE:  Physical Exam  Appearance:  Normal appearance  Cardiovascular:  Normal rate, Capillary refill less than 2 seconds  Pulmonary:  Normal effort, no distress  Abdominal:  No tenderness  MS:  No Swelling, No dependent edema  Skin:  Warm, dry  Neuro:  Alert and oriented x3, reflexes normal.  Psychiatric:  Normal mood and behavior    ASSESSMENT:  24 y.o. female  IUP at 14w4d    Patient Active Problem List    Diagnosis Date Noted    Marijuana use during pregnancy 2024    History of postpartum hemorrhage, currently pregnant 2024      Diagnosis Orders   1. Prenatal care, second trimester        2. 14 weeks gestation of pregnancy        3. Screening, , for fetal anatomic survey  US OB 14 PLUS WEEKS SINGLE OR FIRST GESTATION      4. Encounter for  screening for cervical length  US OB 14 PLUS WEEKS SINGLE OR FIRST GESTATION          PLAN:  Schedule anatomy US at 20 weeks - on/after 24     Follow-Up  Return in about 4 weeks (around 2024) for Prenatal Care Visit.    DENISE Burch CNM

## 2024-04-18 ENCOUNTER — ROUTINE PRENATAL (OUTPATIENT)
Dept: OBGYN CLINIC | Age: 24
End: 2024-04-18

## 2024-04-18 VITALS
DIASTOLIC BLOOD PRESSURE: 68 MMHG | BODY MASS INDEX: 19.64 KG/M2 | SYSTOLIC BLOOD PRESSURE: 108 MMHG | HEART RATE: 98 BPM | WEIGHT: 118 LBS

## 2024-04-18 DIAGNOSIS — Z34.92 PRENATAL CARE, SECOND TRIMESTER: Primary | ICD-10-CM

## 2024-04-18 DIAGNOSIS — Z3A.18 18 WEEKS GESTATION OF PREGNANCY: ICD-10-CM

## 2024-04-19 NOTE — PROGRESS NOTES
SUBJECTIVE:  Denies bleeding, spotting, leaking of fluid, abnormal discharge.  Just beginning to feel fetal movement.  Nausea is continuing to improve.  Zofran causes more constipation symptoms than nausea relief.  Prefers promethazine as needed.  Anatomy US already scheduled on 24.    Review of Systems   Eyes:  Negative for visual disturbance.   Respiratory:  Negative for shortness of breath.    Gastrointestinal:  Negative for abdominal pain, constipation, diarrhea, nausea and vomiting.   Genitourinary:  Negative for dysuria, vaginal bleeding and vaginal discharge.   Neurological:  Negative for headaches.     OBJECTIVE:  Physical Exam  Appearance:  Normal appearance  Cardiovascular:  Normal rate, Capillary refill less than 2 seconds  Pulmonary:  Normal effort, no distress  Abdominal:  No tenderness  MS:  No Swelling, No dependent edema  Skin:  Warm, dry  Neuro:  Alert and oriented x3, reflexes normal.  Psychiatric:  Normal mood and behavior    ASSESSMENT:  24 y.o. female  IUP at 18w4d    Patient Active Problem List    Diagnosis Date Noted    Marijuana use during pregnancy 2024    History of postpartum hemorrhage, currently pregnant 2024      Diagnosis Orders   1. Prenatal care, second trimester        2. 18 weeks gestation of pregnancy            PLAN:  Keep US appointment already scheduled on 24.     Follow-Up  Return in about 4 weeks (around 2024) for Prenatal Care Visit.    DENISE Burch CNM

## 2024-05-02 ENCOUNTER — HOSPITAL ENCOUNTER (OUTPATIENT)
Dept: ULTRASOUND IMAGING | Age: 24
Discharge: HOME | End: 2024-05-04
Attending: ADVANCED PRACTICE MIDWIFE
Payer: COMMERCIAL

## 2024-05-02 DIAGNOSIS — Z36.89 SCREENING, ANTENATAL, FOR FETAL ANATOMIC SURVEY: ICD-10-CM

## 2024-05-02 DIAGNOSIS — Z36.86 ENCOUNTER FOR ANTENATAL SCREENING FOR CERVICAL LENGTH: ICD-10-CM

## 2024-05-02 PROCEDURE — 76805 OB US >/= 14 WKS SNGL FETUS: CPT

## 2024-05-03 NOTE — RESULT ENCOUNTER NOTE
5/2/24 US:  20w 2d, EDC 9/17/24.  Variable presentation, anatomy Complete and WNL.   grams (0lb. 12oz) at 53%.  BELLA WNL.  Anterior placenta.  Cervical length 4.4 cm.    Impression:    Fetal Anatomy complete and WNL  Reassuring cervical length  Appropriate interval growth - 53%

## 2024-05-14 ENCOUNTER — OFFICE VISIT (OUTPATIENT)
Dept: FAMILY MEDICINE CLINIC | Age: 24
End: 2024-05-14

## 2024-05-14 VITALS
HEIGHT: 65 IN | TEMPERATURE: 98.2 F | WEIGHT: 132.4 LBS | SYSTOLIC BLOOD PRESSURE: 100 MMHG | OXYGEN SATURATION: 98 % | HEART RATE: 90 BPM | BODY MASS INDEX: 22.06 KG/M2 | DIASTOLIC BLOOD PRESSURE: 64 MMHG

## 2024-05-14 DIAGNOSIS — J06.9 UPPER RESPIRATORY TRACT INFECTION, UNSPECIFIED TYPE: Primary | ICD-10-CM

## 2024-05-14 PROCEDURE — 99214 OFFICE O/P EST MOD 30 MIN: CPT | Performed by: NURSE PRACTITIONER

## 2024-05-14 RX ORDER — CETIRIZINE HYDROCHLORIDE 10 MG/1
10 TABLET ORAL DAILY
Qty: 30 TABLET | Refills: 0 | Status: SHIPPED | OUTPATIENT
Start: 2024-05-14

## 2024-05-14 RX ORDER — AMOXICILLIN 875 MG/1
875 TABLET, COATED ORAL 2 TIMES DAILY
Qty: 20 TABLET | Refills: 0 | Status: SHIPPED | OUTPATIENT
Start: 2024-05-14 | End: 2024-05-24

## 2024-05-14 ASSESSMENT — PATIENT HEALTH QUESTIONNAIRE - PHQ9
8. MOVING OR SPEAKING SO SLOWLY THAT OTHER PEOPLE COULD HAVE NOTICED. OR THE OPPOSITE, BEING SO FIGETY OR RESTLESS THAT YOU HAVE BEEN MOVING AROUND A LOT MORE THAN USUAL: NOT AT ALL
SUM OF ALL RESPONSES TO PHQ9 QUESTIONS 1 & 2: 0
10. IF YOU CHECKED OFF ANY PROBLEMS, HOW DIFFICULT HAVE THESE PROBLEMS MADE IT FOR YOU TO DO YOUR WORK, TAKE CARE OF THINGS AT HOME, OR GET ALONG WITH OTHER PEOPLE: NOT DIFFICULT AT ALL
1. LITTLE INTEREST OR PLEASURE IN DOING THINGS: NOT AT ALL
6. FEELING BAD ABOUT YOURSELF - OR THAT YOU ARE A FAILURE OR HAVE LET YOURSELF OR YOUR FAMILY DOWN: NOT AT ALL
7. TROUBLE CONCENTRATING ON THINGS, SUCH AS READING THE NEWSPAPER OR WATCHING TELEVISION: NOT AT ALL
SUM OF ALL RESPONSES TO PHQ QUESTIONS 1-9: 0
3. TROUBLE FALLING OR STAYING ASLEEP: NOT AT ALL
2. FEELING DOWN, DEPRESSED OR HOPELESS: NOT AT ALL
SUM OF ALL RESPONSES TO PHQ QUESTIONS 1-9: 0
4. FEELING TIRED OR HAVING LITTLE ENERGY: NOT AT ALL
5. POOR APPETITE OR OVEREATING: NOT AT ALL
SUM OF ALL RESPONSES TO PHQ QUESTIONS 1-9: 0
SUM OF ALL RESPONSES TO PHQ QUESTIONS 1-9: 0
9. THOUGHTS THAT YOU WOULD BE BETTER OFF DEAD, OR OF HURTING YOURSELF: NOT AT ALL

## 2024-05-14 ASSESSMENT — ENCOUNTER SYMPTOMS
SINUS PRESSURE: 1
RHINORRHEA: 1
SINUS PAIN: 1
ABDOMINAL PAIN: 0
COUGH: 1
VOMITING: 0
SORE THROAT: 1
CHEST TIGHTNESS: 0
TROUBLE SWALLOWING: 0
SHORTNESS OF BREATH: 1
WHEEZING: 0

## 2024-05-14 NOTE — PROGRESS NOTES
Not on file   Intimate Partner Violence: Not on file   Housing Stability: Unknown (9/11/2023)    Housing Stability Vital Sign     Unable to Pay for Housing in the Last Year: Not on file     Number of Places Lived in the Last Year: Not on file     Unstable Housing in the Last Year: No     Family History   Problem Relation Age of Onset    Asthma Other         UNCLE WITH MILD ASTHMA    No Known Problems Mother     No Known Problems Father     No Known Problems Paternal Grandmother     Pacemaker Paternal Grandfather     Anxiety Disorder Maternal Grandmother     Anxiety Disorder Maternal Grandfather     Epilepsy Sister     No Known Problems Sister     No Known Problems Brother     Congenital Heart Defect Brother     Breast Cancer Neg Hx      No Known Allergies  Current Outpatient Medications   Medication Sig Dispense Refill    amoxicillin (AMOXIL) 875 MG tablet Take 1 tablet by mouth 2 times daily for 10 days 20 tablet 0    cetirizine (ZYRTEC) 10 MG tablet Take 1 tablet by mouth daily 30 tablet 0    dextromethorphan-guaiFENesin (MUCINEX DM)  MG per extended release tablet Take 1 tablet by mouth every 12 hours as needed for Cough or Congestion 28 tablet 0    Prenatal Vit-Fe Fumarate-FA (PRENATAL PLUS VITAMIN/MINERAL) 27-1 MG TABS Take 1 tablet by mouth daily 90 tablet 3    metroNIDAZOLE (FLAGYL) 500 MG tablet Take 1 tablet by mouth in the morning and 1 tablet in the evening. (Patient not taking: Reported on 5/14/2024) 14 tablet 1    promethazine (PHENERGAN) 25 MG tablet Take 1 tablet at bedtime to decrease morning nausea/vomiting.  Take 1/2 -1 tablet during daytime hours every 6 hours as needed. (Patient not taking: Reported on 5/14/2024) 60 tablet 1    ondansetron (ZOFRAN-ODT) 4 MG disintegrating tablet Take 1 tablet by mouth 3 times daily as needed for Nausea or Vomiting (Patient not taking: Reported on 5/14/2024) 21 tablet 0     No current facility-administered medications for this visit.     PMH, Surgical Hx,

## 2024-05-22 ENCOUNTER — ROUTINE PRENATAL (OUTPATIENT)
Dept: OBGYN CLINIC | Age: 24
End: 2024-05-22

## 2024-05-22 VITALS
DIASTOLIC BLOOD PRESSURE: 62 MMHG | WEIGHT: 131 LBS | BODY MASS INDEX: 21.8 KG/M2 | HEART RATE: 85 BPM | SYSTOLIC BLOOD PRESSURE: 104 MMHG

## 2024-05-22 DIAGNOSIS — Z34.92 PRENATAL CARE, SECOND TRIMESTER: Primary | ICD-10-CM

## 2024-05-22 DIAGNOSIS — Z3A.23 23 WEEKS GESTATION OF PREGNANCY: ICD-10-CM

## 2024-05-22 DIAGNOSIS — O21.9 NAUSEA AND VOMITING IN PREGNANCY: ICD-10-CM

## 2024-05-22 PROCEDURE — 99214 OFFICE O/P EST MOD 30 MIN: CPT | Performed by: ADVANCED PRACTICE MIDWIFE

## 2024-05-22 RX ORDER — METOCLOPRAMIDE 10 MG/1
10 TABLET ORAL EVERY 6 HOURS PRN
Qty: 30 TABLET | Refills: 0 | Status: SHIPPED | OUTPATIENT
Start: 2024-05-22

## 2024-05-22 ASSESSMENT — ENCOUNTER SYMPTOMS
VOMITING: 0
DIARRHEA: 0
ABDOMINAL PAIN: 0
SHORTNESS OF BREATH: 0
CONSTIPATION: 0
NAUSEA: 1

## 2024-05-22 NOTE — PROGRESS NOTES
SUBJECTIVE:  Denies bleeding, spotting, leaking of fluid, abnormal discharge.  Good fetal movement.  Continuing to have frequent nausea most days.  Phenergan at bedtime does some morning relief, but she is unable to tolerate the constipation that occurs when Zofran is taken for daytime symptoms.  Discussed collecting 28 week labs with next visit.    Review of Systems   Eyes:  Negative for visual disturbance.   Respiratory:  Negative for shortness of breath.    Gastrointestinal:  Positive for nausea. Negative for abdominal pain, constipation, diarrhea and vomiting.   Genitourinary:  Negative for dysuria, vaginal bleeding and vaginal discharge.   Neurological:  Negative for headaches.     OBJECTIVE:  24 US: 20w 2d, EDC 24. Variable presentation, anatomy Complete and WNL.  grams (0lb. 12oz) at 53%. BELLA WNL. Anterior placenta. Cervical length 4.4 cm.     Physical Exam  Appearance:  Normal appearance  Cardiovascular:  Normal rate, Capillary refill less than 2 seconds  Pulmonary:  Normal effort, no distress  Abdominal:  No tenderness  MS:  No Swelling, No dependent edema  Skin:  Warm, dry  Neuro:  Alert and oriented x3, reflexes normal.  Psychiatric:  Normal mood and behavior    ASSESSMENT:  24 y.o. female  IUP at 23w2d  Fetal Anatomy complete and WNL  Reassuring cervical length  Appropriate interval growth - 53%    Patient Active Problem List    Diagnosis Date Noted    Marijuana use during pregnancy 2024    History of postpartum hemorrhage, currently pregnant 2024      Diagnosis Orders   1. Prenatal care, second trimester  RPR    Glucose tolerance, 1 hour    CBC with Auto Differential      2. 23 weeks gestation of pregnancy        3. Nausea and vomiting in pregnancy  metoclopramide (REGLAN) 10 MG tablet          PLAN:  N/V - stop Zofran, try Reglan  Next US at 30 weeks for interval growth     Follow-Up  Return in about 5 weeks (around 2024) for Prenatal Visit with 1 hour Glucose

## 2024-06-05 DIAGNOSIS — J06.9 UPPER RESPIRATORY TRACT INFECTION, UNSPECIFIED TYPE: ICD-10-CM

## 2024-06-06 RX ORDER — CETIRIZINE HYDROCHLORIDE 10 MG/1
10 TABLET ORAL DAILY
Qty: 90 TABLET | Refills: 0 | Status: SHIPPED | OUTPATIENT
Start: 2024-06-06

## 2024-06-20 ENCOUNTER — TELEPHONE (OUTPATIENT)
Dept: OBGYN CLINIC | Age: 24
End: 2024-06-20

## 2024-06-20 ENCOUNTER — HOSPITAL ENCOUNTER (OUTPATIENT)
Age: 24
Discharge: HOME | End: 2024-06-20
Attending: ADVANCED PRACTICE MIDWIFE | Admitting: OBSTETRICS & GYNECOLOGY

## 2024-06-20 VITALS
SYSTOLIC BLOOD PRESSURE: 103 MMHG | WEIGHT: 140 LBS | DIASTOLIC BLOOD PRESSURE: 58 MMHG | TEMPERATURE: 98.2 F | BODY MASS INDEX: 23.32 KG/M2 | HEIGHT: 65 IN | RESPIRATION RATE: 18 BRPM | OXYGEN SATURATION: 99 % | HEART RATE: 58 BPM

## 2024-06-20 PROBLEM — O26.892 VAGINAL DISCHARGE DURING PREGNANCY IN SECOND TRIMESTER: Status: ACTIVE | Noted: 2024-06-20

## 2024-06-20 PROBLEM — Z3A.27 27 WEEKS GESTATION OF PREGNANCY: Status: ACTIVE | Noted: 2024-06-20

## 2024-06-20 PROBLEM — O47.02 PRETERM UTERINE CONTRACTIONS IN SECOND TRIMESTER, ANTEPARTUM: Status: ACTIVE | Noted: 2024-06-20

## 2024-06-20 PROBLEM — N89.8 VAGINAL DISCHARGE DURING PREGNANCY IN SECOND TRIMESTER: Status: ACTIVE | Noted: 2024-06-20

## 2024-06-20 LAB
AMPHET UR QL SCN: ABNORMAL
BACTERIA URNS QL MICRO: NEGATIVE /HPF
BARBITURATES UR QL SCN: ABNORMAL
BENZODIAZ UR QL SCN: ABNORMAL
BILIRUB UR QL STRIP: NEGATIVE
CANNABINOIDS UR QL SCN: POSITIVE
CLARITY UR: CLEAR
CLUE CELLS VAG QL WET PREP: NORMAL
COCAINE UR QL SCN: ABNORMAL
COLOR UR: YELLOW
DRUG SCREEN COMMENT UR-IMP: ABNORMAL
EPI CELLS #/AREA URNS AUTO: NORMAL /HPF (ref 0–5)
FENTANYL SCREEN, URINE: ABNORMAL
GLUCOSE UR STRIP-MCNC: NEGATIVE MG/DL
HGB UR QL STRIP: NEGATIVE
HYALINE CASTS #/AREA URNS AUTO: NORMAL /HPF (ref 0–5)
KETONES UR STRIP-MCNC: NEGATIVE MG/DL
LEUKOCYTE ESTERASE UR QL STRIP: ABNORMAL
METHADONE UR QL SCN: ABNORMAL
NITRITE UR QL STRIP: NEGATIVE
OPIATES UR QL SCN: ABNORMAL
OXYCODONE UR QL SCN: ABNORMAL
PCP UR QL SCN: ABNORMAL
PH UR STRIP: 7 [PH] (ref 5–9)
PROPOXYPH UR QL SCN: ABNORMAL
PROT UR STRIP-MCNC: NEGATIVE MG/DL
RBC #/AREA URNS AUTO: NORMAL /HPF (ref 0–5)
SP GR UR STRIP: 1.01 (ref 1–1.03)
T VAGINALIS VAG QL WET PREP: NORMAL
TRICHOMONAS VAGINALIS SCREEN: NEGATIVE
URINE REFLEX TO CULTURE: ABNORMAL
UROBILINOGEN UR STRIP-ACNC: 0.2 E.U./DL
WBC #/AREA URNS AUTO: NORMAL /HPF (ref 0–5)
YEAST VAG QL WET PREP: NORMAL

## 2024-06-20 PROCEDURE — 87491 CHLMYD TRACH DNA AMP PROBE: CPT

## 2024-06-20 PROCEDURE — 80307 DRUG TEST PRSMV CHEM ANLYZR: CPT

## 2024-06-20 PROCEDURE — 96361 HYDRATE IV INFUSION ADD-ON: CPT

## 2024-06-20 PROCEDURE — 2580000003 HC RX 258: Performed by: OBSTETRICS & GYNECOLOGY

## 2024-06-20 PROCEDURE — 99284 EMERGENCY DEPT VISIT MOD MDM: CPT

## 2024-06-20 PROCEDURE — 96372 THER/PROPH/DIAG INJ SC/IM: CPT

## 2024-06-20 PROCEDURE — 59025 FETAL NON-STRESS TEST: CPT | Performed by: OBSTETRICS & GYNECOLOGY

## 2024-06-20 PROCEDURE — 6360000002 HC RX W HCPCS: Performed by: OBSTETRICS & GYNECOLOGY

## 2024-06-20 PROCEDURE — 87210 SMEAR WET MOUNT SALINE/INK: CPT

## 2024-06-20 PROCEDURE — 81001 URINALYSIS AUTO W/SCOPE: CPT

## 2024-06-20 PROCEDURE — 87591 N.GONORRHOEAE DNA AMP PROB: CPT

## 2024-06-20 PROCEDURE — 99214 OFFICE O/P EST MOD 30 MIN: CPT | Performed by: OBSTETRICS & GYNECOLOGY

## 2024-06-20 PROCEDURE — 87808 TRICHOMONAS ASSAY W/OPTIC: CPT

## 2024-06-20 PROCEDURE — 96374 THER/PROPH/DIAG INJ IV PUSH: CPT

## 2024-06-20 RX ORDER — SODIUM CHLORIDE, SODIUM LACTATE, POTASSIUM CHLORIDE, AND CALCIUM CHLORIDE .6; .31; .03; .02 G/100ML; G/100ML; G/100ML; G/100ML
1000 INJECTION, SOLUTION INTRAVENOUS ONCE
Status: COMPLETED | OUTPATIENT
Start: 2024-06-20 | End: 2024-06-20

## 2024-06-20 RX ORDER — ONDANSETRON 2 MG/ML
4 INJECTION INTRAMUSCULAR; INTRAVENOUS EVERY 6 HOURS PRN
Status: DISCONTINUED | OUTPATIENT
Start: 2024-06-20 | End: 2024-06-20 | Stop reason: HOSPADM

## 2024-06-20 RX ORDER — NALBUPHINE HYDROCHLORIDE 20 MG/ML
10 INJECTION, SOLUTION INTRAMUSCULAR; INTRAVENOUS; SUBCUTANEOUS ONCE
Status: COMPLETED | OUTPATIENT
Start: 2024-06-20 | End: 2024-06-20

## 2024-06-20 RX ADMIN — ONDANSETRON 4 MG: 2 INJECTION INTRAMUSCULAR; INTRAVENOUS at 10:58

## 2024-06-20 RX ADMIN — Medication 10 MG: at 10:29

## 2024-06-20 RX ADMIN — SODIUM CHLORIDE, POTASSIUM CHLORIDE, SODIUM LACTATE AND CALCIUM CHLORIDE 1000 ML: 600; 310; 30; 20 INJECTION, SOLUTION INTRAVENOUS at 10:57

## 2024-06-20 ASSESSMENT — PAIN DESCRIPTION - DESCRIPTORS: DESCRIPTORS: CRAMPING;SHARP

## 2024-06-20 ASSESSMENT — PAIN DESCRIPTION - LOCATION: LOCATION: ABDOMEN

## 2024-06-20 ASSESSMENT — PAIN SCALES - GENERAL: PAINLEVEL_OUTOF10: 6

## 2024-06-20 NOTE — TELEPHONE ENCOUNTER
Pt called, c/o pain increasing since this morning. + FM . Pt was advised to go to labor and delivery for evaluation. Per

## 2024-06-20 NOTE — ED TRIAGE NOTES
Department of Obstetrics and Gynecology  Labor and Delivery  Giovani Cabello MD: OB Triage Note      SUBJECTIVE:  Patient is a 23 yo  female @ 27.3 weeks with EDC 24 s/b LMP 23 c/b 8.6 week sono.  She presents with complaints of contractions since 7 am. She denies any previous incidences in past. She admits to only drinking 2 bottles of water yesterday but usually stays pretty hydrated. She reports increase in vaginal discharge over the last couple of days. She reports coitus several days ago. She reports +FM, denies LOF. She presents for reassurance.     OBJECTIVE    ROS:  Gen: Uncomfortable  CV: Negative  Lungs: Negative  Abdomen: +FM, abdominal pain  Pelvis: vaginal discharge  Rest of systems reviewed and found to be negative.        Recent Vitals     Most Recent Value 2024  0941 2024  0940 2024  0939   BP: 104/58 Abnormal   as of 2024 -- 104/58 Abnormal  105/56 Abnormal    Temp: 98.2 °F (36.8 °C)  as of 2024 -- -- 98.2 °F (36.8 °C)   Pulse: 75  as of 2024 -- 75 70   Height: 165.1 cm (5' 5\")  as of 2024 165.1 cm (5' 5\") -- --   Weight - Scale: 63.5 kg (140 lb)  as of 2024 63.5 kg (140 lb) -- --   Body Mass Index: 23.30 kg/m²  165.1 cm (5' 5\")  as of 2024  63.5 kg (140 lb)  as of 2024      Respirations: 18  as of 2024 -- -- 18   SpO2: 99%  as of 2024 -- -- 99 %       PE:   Gen: A x O x 3, in NAD  Abdomen: soft, gravid, +FM  Cervix:             Dilation:  closed          Effacement:  0 %         Station:  -3         Consistency:  Medium         Position: posterior    Fetal heart rate:  Category I (AGA)-duration 30 minutes       Baseline Heart Rate: 130s AGA       Accelerations:  present       Decelerations: none       Variability:  moderate    Contraction frequency: q 2-3 minutes    DATA:  Results     Component Value Units   Trichomonas by EIA [[id number]]    Collected: 24 1019    Updated: 24 1037     TRICHOMONAS VAGINALIS  long.  Nubain given x 1 dose for discomfort. Zofran given for nausea  Wet Prep was negative  GC/CT cultures obtained. Results are pending.   1 liter Bolus of Lactated Ringers given   Discharge home with instructions, fetal kick counts and follow up with Kaykay Kruger CNM on 6/26/24 @ 5:30 Pm

## 2024-06-20 NOTE — FLOWSHEET NOTE
Dr Cabello to BS assessing pt, viewing EFM, VE, collecting swabs, interviewing/educating pt, discussing POC.

## 2024-06-20 NOTE — FLOWSHEET NOTE
Dr Cabello notified of pt c/o abd pain today, slight dizziness upon standing this morning, reports only drinking 2 bottles of water yesterday, EFM w/cx, hx, assessment-awaiting urine results.

## 2024-06-25 LAB
C TRACH DNA CVX QL NAA+PROBE: NEGATIVE
N GONORRHOEA DNA CERV MUCUS QL NAA+PROBE: NEGATIVE

## 2024-06-26 ENCOUNTER — ROUTINE PRENATAL (OUTPATIENT)
Dept: OBGYN CLINIC | Age: 24
End: 2024-06-26

## 2024-06-26 ENCOUNTER — HOSPITAL ENCOUNTER (OUTPATIENT)
Age: 24
Discharge: HOME | End: 2024-06-26
Attending: OBSTETRICS & GYNECOLOGY | Admitting: OBSTETRICS & GYNECOLOGY

## 2024-06-26 VITALS
TEMPERATURE: 98.2 F | HEART RATE: 88 BPM | SYSTOLIC BLOOD PRESSURE: 98 MMHG | DIASTOLIC BLOOD PRESSURE: 68 MMHG | RESPIRATION RATE: 20 BRPM

## 2024-06-26 VITALS
HEART RATE: 83 BPM | DIASTOLIC BLOOD PRESSURE: 66 MMHG | WEIGHT: 136 LBS | BODY MASS INDEX: 22.63 KG/M2 | SYSTOLIC BLOOD PRESSURE: 104 MMHG

## 2024-06-26 DIAGNOSIS — O47.02 PRETERM UTERINE CONTRACTIONS IN SECOND TRIMESTER, ANTEPARTUM: ICD-10-CM

## 2024-06-26 DIAGNOSIS — Z36.89 ENCOUNTER FOR ULTRASOUND TO ASSESS FETAL GROWTH: ICD-10-CM

## 2024-06-26 DIAGNOSIS — Z3A.28 28 WEEKS GESTATION OF PREGNANCY: ICD-10-CM

## 2024-06-26 DIAGNOSIS — Z34.93 PRENATAL CARE, THIRD TRIMESTER: Primary | ICD-10-CM

## 2024-06-26 PROBLEM — O26.899 ABDOMINAL PAIN IN PREGNANCY, ANTEPARTUM: Status: ACTIVE | Noted: 2024-06-26

## 2024-06-26 PROBLEM — R10.9 ABDOMINAL PAIN IN PREGNANCY, ANTEPARTUM: Status: ACTIVE | Noted: 2024-06-26

## 2024-06-26 LAB
AMPHET UR QL SCN: NORMAL
BACTERIA URNS QL MICRO: ABNORMAL /HPF
BARBITURATES UR QL SCN: NORMAL
BENZODIAZ UR QL SCN: NORMAL
BILIRUB UR QL STRIP: NEGATIVE
CANNABINOIDS UR QL SCN: NORMAL
CLARITY UR: CLEAR
COCAINE UR QL SCN: NORMAL
COLOR UR: YELLOW
DRUG SCREEN COMMENT UR-IMP: NORMAL
EPI CELLS #/AREA URNS HPF: ABNORMAL /HPF
FENTANYL SCREEN, URINE: NORMAL
GLUCOSE UR STRIP-MCNC: NEGATIVE MG/DL
HGB UR QL STRIP: NEGATIVE
KETONES UR STRIP-MCNC: ABNORMAL MG/DL
LEUKOCYTE ESTERASE UR QL STRIP: ABNORMAL
METHADONE UR QL SCN: NORMAL
NITRITE UR QL STRIP: NEGATIVE
OPIATES UR QL SCN: NORMAL
OXYCODONE UR QL SCN: NORMAL
PCP UR QL SCN: NORMAL
PH UR STRIP: 7 [PH] (ref 5–9)
PROPOXYPH UR QL SCN: NORMAL
PROT UR STRIP-MCNC: NEGATIVE MG/DL
RBC #/AREA URNS HPF: ABNORMAL /HPF (ref 0–2)
SP GR UR STRIP: 1 (ref 1–1.03)
URINE REFLEX TO CULTURE: ABNORMAL
UROBILINOGEN UR STRIP-ACNC: 0.2 E.U./DL
WBC #/AREA URNS HPF: ABNORMAL /HPF (ref 0–5)

## 2024-06-26 PROCEDURE — 80307 DRUG TEST PRSMV CHEM ANLYZR: CPT

## 2024-06-26 PROCEDURE — 99214 OFFICE O/P EST MOD 30 MIN: CPT | Performed by: ADVANCED PRACTICE MIDWIFE

## 2024-06-26 PROCEDURE — 99213 OFFICE O/P EST LOW 20 MIN: CPT | Performed by: OBSTETRICS & GYNECOLOGY

## 2024-06-26 PROCEDURE — 99282 EMERGENCY DEPT VISIT SF MDM: CPT

## 2024-06-26 PROCEDURE — 81001 URINALYSIS AUTO W/SCOPE: CPT

## 2024-06-26 RX ORDER — CEPHALEXIN 500 MG/1
500 CAPSULE ORAL 2 TIMES DAILY
Qty: 14 CAPSULE | Refills: 0 | Status: SHIPPED | OUTPATIENT
Start: 2024-06-26 | End: 2024-07-03

## 2024-06-26 RX ORDER — PHENAZOPYRIDINE HYDROCHLORIDE 200 MG/1
200 TABLET, FILM COATED ORAL 3 TIMES DAILY PRN
Qty: 15 TABLET | Refills: 0 | Status: SHIPPED | OUTPATIENT
Start: 2024-06-26 | End: 2024-07-01

## 2024-06-26 NOTE — DISCHARGE INSTRUCTIONS
Return if water breaks, bright red vaginal bleeding, regular contractions and/or pain that does not stop regardless of contractions.   Increase water intake  Ask ROCKY Mccracken about urine results

## 2024-06-26 NOTE — PROGRESS NOTES
Arrives to triage with c/o abd pain . Into BR, instructed on UA sample, verbalized understanding. UA sample obtained and sent to lab.   
Discharge instructions provided, verbalized understanding. Dc'd home with all belongings.   
Dr Navas at bedside obtaining hx and discussing POC, verbalized understanding. SVE performed per Dr Navas, tolerated well. No blood on exam glove.   
Reports abd pain since 130 pm. Denies vaginal bleeding and LOF. Reports fetal movement. Last saw ROCKY Mccracken 3-4 wks ago. Has appt today at 530 pm  
PLUS VITAMIN/MINERAL) 27-1 MG TABS Take 1 tablet by mouth daily  Qty: 90 tablet, Refills: 3    Associated Diagnoses: Early stage of pregnancy           STOP taking these medications       cetirizine (ZYRTEC) 10 MG tablet Comments:   Reason for Stopping:         metoclopramide (REGLAN) 10 MG tablet Comments:   Reason for Stopping:               Pt instructed to return if:     - Leaking fluid  - Vaginal bleeding  - Regular contractions:  Every 5 minutes or closer for one hour  - Decreased fetal movement  - Worsening abdominal (belly) pain  - Headache, blurry vision, increased swelling, upper abdominal pain    Reji Lazo DO, MBA, FACOOG, FACOG  June 26, 2024 4:43 PM

## 2024-06-27 PROBLEM — R10.9 ABDOMINAL PAIN IN PREGNANCY, ANTEPARTUM: Status: RESOLVED | Noted: 2024-06-26 | Resolved: 2024-06-27

## 2024-06-27 PROBLEM — O26.892 VAGINAL DISCHARGE DURING PREGNANCY IN SECOND TRIMESTER: Status: RESOLVED | Noted: 2024-06-20 | Resolved: 2024-06-27

## 2024-06-27 PROBLEM — Z3A.27 27 WEEKS GESTATION OF PREGNANCY: Status: RESOLVED | Noted: 2024-06-20 | Resolved: 2024-06-27

## 2024-06-27 PROBLEM — N89.8 VAGINAL DISCHARGE DURING PREGNANCY IN SECOND TRIMESTER: Status: RESOLVED | Noted: 2024-06-20 | Resolved: 2024-06-27

## 2024-06-27 PROBLEM — O26.899 ABDOMINAL PAIN IN PREGNANCY, ANTEPARTUM: Status: RESOLVED | Noted: 2024-06-26 | Resolved: 2024-06-27

## 2024-06-27 ASSESSMENT — ENCOUNTER SYMPTOMS
DIARRHEA: 0
ABDOMINAL PAIN: 0
SHORTNESS OF BREATH: 0
CONSTIPATION: 0
NAUSEA: 0
VOMITING: 0
BACK PAIN: 1

## 2024-06-27 NOTE — PROGRESS NOTES
Pt has been having contractions all day since about 1:30. She was seen at L&D, and was told to come in and discuss urine results. She has been having abdominal pains  
    Epithelial Cells, UA   Date Value Ref Range Status   2024 3-5 /HPF Final     Bacteria, UA   Date Value Ref Range Status   2024 RARE (A) Negative /HPF Final     Physical Exam  Appearance:  Normal appearance  Cardiovascular:  Normal rate, Capillary refill less than 2 seconds  Pulmonary:  Normal effort, no distress  Abdominal:  No tenderness  MS:  No Swelling, No dependent edema  Skin:  Warm, dry  Neuro:  Alert and oriented x3, reflexes normal.  Psychiatric:  Normal mood and behavior    ASSESSMENT:  24 y.o. female  IUP at 28w3d    Patient Active Problem List    Diagnosis Date Noted     uterine contractions in second trimester, antepartum 2024    Marijuana use during pregnancy 2024    History of postpartum hemorrhage, currently pregnant 2024      Diagnosis Orders   1. Prenatal care, third trimester  Glucose tolerance, 1 hour    CBC with Auto Differential    RPR      2. 28 weeks gestation of pregnancy        3.  uterine contractions in second trimester, antepartum  phenazopyridine (PYRIDIUM) 200 MG tablet    cephALEXin (KEFLEX) 500 MG capsule      4. Encounter for ultrasound to assess fetal growth  US OB 1 OR MORE FETUS LIMITED    US OB TRANSVAGINAL          PLAN:   Contractions - Start Keflex and Pyridium.  Labor precautions reviewed.  Follow-up tomorrow if symptoms are not improving     Follow-Up  Return in 1 day (on 2024) for Prenatal Care Visit.    DENISE Burch CNM

## 2024-07-09 ENCOUNTER — HOSPITAL ENCOUNTER (OUTPATIENT)
Dept: ULTRASOUND IMAGING | Age: 24
Discharge: HOME | End: 2024-07-11
Attending: ADVANCED PRACTICE MIDWIFE
Payer: COMMERCIAL

## 2024-07-09 DIAGNOSIS — Z36.89 ENCOUNTER FOR ULTRASOUND TO ASSESS FETAL GROWTH: ICD-10-CM

## 2024-07-09 PROCEDURE — 76815 OB US LIMITED FETUS(S): CPT

## 2024-07-10 ENCOUNTER — ROUTINE PRENATAL (OUTPATIENT)
Dept: OBGYN CLINIC | Age: 24
End: 2024-07-10

## 2024-07-10 VITALS
BODY MASS INDEX: 23.13 KG/M2 | HEART RATE: 77 BPM | DIASTOLIC BLOOD PRESSURE: 54 MMHG | SYSTOLIC BLOOD PRESSURE: 102 MMHG | WEIGHT: 139 LBS

## 2024-07-10 DIAGNOSIS — Z34.93 PRENATAL CARE, THIRD TRIMESTER: Primary | ICD-10-CM

## 2024-07-10 DIAGNOSIS — Z3A.30 30 WEEKS GESTATION OF PREGNANCY: ICD-10-CM

## 2024-07-10 PROBLEM — O47.02 PRETERM UTERINE CONTRACTIONS IN SECOND TRIMESTER, ANTEPARTUM: Status: RESOLVED | Noted: 2024-06-20 | Resolved: 2024-07-10

## 2024-07-10 PROCEDURE — 99212 OFFICE O/P EST SF 10 MIN: CPT | Performed by: ADVANCED PRACTICE MIDWIFE

## 2024-07-10 NOTE — PROGRESS NOTES
SUBJECTIVE:  Denies bleeding, spotting, leaking of fluid, abnormal discharge.  Good fetal movement  Finally feeling better with good improvement in nausea and vomiting.  Better able to tolerate food and feeling like she has more energy.  Reviewed US results - cephalic, growth 52%.  Next growth US at 36 weeks.  Encouraged to complete 28 week labs as soon as possible.    Review of Systems   Eyes:  Negative for visual disturbance.   Respiratory:  Negative for shortness of breath.    Gastrointestinal:  Negative for abdominal pain, constipation, diarrhea, nausea and vomiting.   Genitourinary:  Negative for dysuria, vaginal bleeding and vaginal discharge.   Neurological:  Negative for headaches.     OBJECTIVE:  24 US: 30w 4d, EDC 24. Cephalic presentation, anatomy WNL. EFW 1600 grams (3lb. 8oz) at 53%. BELLA 12 cm. Anterior placenta. Cervical length 3.8 cm     Physical Exam  Appearance:  Normal appearance  Cardiovascular:  Normal rate, Capillary refill less than 2 seconds  Pulmonary:  Normal effort, no distress  Abdominal:  No tenderness  MS:  No Swelling, No dependent edema  Skin:  Warm, dry  Neuro:  Alert and oriented x3, reflexes normal.  Psychiatric:  Normal mood and behavior    ASSESSMENT:  24 y.o. female  IUP at 30w2d  Fetal Anatomy WNL  Reassuring cervical length  Appropriate interval growth - 53%    Patient Active Problem List    Diagnosis Date Noted    Marijuana use during pregnancy 2024    History of postpartum hemorrhage, currently pregnant 2024      Diagnosis Orders   1. Prenatal care, third trimester        2. 30 weeks gestation of pregnancy            PLAN:  Encouraged to complete 28 week labs as soon as possible.  Final growth US at 36 weeks     Follow-Up  Return in about 2 weeks (around 2024) for Prenatal Care Visit.    DENISE Burch CNM

## 2024-07-10 NOTE — RESULT ENCOUNTER NOTE
7/9/24 US:  30w 4d, EDC 9/13/24.  Cephalic presentation, anatomy WNL.  EFW 1600 grams (3lb. 8oz) at 53%.  BELLA 12 cm.  Anterior placenta.  Cervical length 3.8 cm.    Impression:    Fetal Anatomy WNL  Reassuring cervical length  Appropriate interval growth - 53%

## 2024-07-11 DIAGNOSIS — Z34.93 PRENATAL CARE, THIRD TRIMESTER: ICD-10-CM

## 2024-07-11 LAB
BASOPHILS # BLD: 0 K/UL (ref 0–0.2)
BASOPHILS NFR BLD: 0.2 %
EOSINOPHIL # BLD: 0.1 K/UL (ref 0–0.7)
EOSINOPHIL NFR BLD: 0.6 %
ERYTHROCYTE [DISTWIDTH] IN BLOOD BY AUTOMATED COUNT: 13.5 % (ref 11.5–14.5)
GLUCOSE, 1HR PP: 69 MG/DL (ref 60–140)
HCT VFR BLD AUTO: 33.5 % (ref 37–47)
HGB BLD-MCNC: 10.7 G/DL (ref 12–16)
LYMPHOCYTES # BLD: 1.5 K/UL (ref 1–4.8)
LYMPHOCYTES NFR BLD: 17.4 %
MCH RBC QN AUTO: 28.2 PG (ref 27–31.3)
MCHC RBC AUTO-ENTMCNC: 31.9 % (ref 33–37)
MCV RBC AUTO: 88.2 FL (ref 79.4–94.8)
MONOCYTES # BLD: 0.6 K/UL (ref 0.2–0.8)
MONOCYTES NFR BLD: 7 %
NEUTROPHILS # BLD: 6.6 K/UL (ref 1.4–6.5)
NEUTS SEG NFR BLD: 74.5 %
PLATELET # BLD AUTO: 155 K/UL (ref 130–400)
RBC # BLD AUTO: 3.8 M/UL (ref 4.2–5.4)
WBC # BLD AUTO: 8.8 K/UL (ref 4.8–10.8)

## 2024-07-12 LAB — RPR SER QL: NORMAL

## 2024-07-24 ENCOUNTER — ROUTINE PRENATAL (OUTPATIENT)
Dept: OBGYN CLINIC | Age: 24
End: 2024-07-24

## 2024-07-24 VITALS
DIASTOLIC BLOOD PRESSURE: 66 MMHG | WEIGHT: 143 LBS | HEART RATE: 87 BPM | BODY MASS INDEX: 23.8 KG/M2 | SYSTOLIC BLOOD PRESSURE: 108 MMHG

## 2024-07-24 DIAGNOSIS — Z3A.32 32 WEEKS GESTATION OF PREGNANCY: ICD-10-CM

## 2024-07-24 DIAGNOSIS — N89.8 VAGINAL DISCHARGE: ICD-10-CM

## 2024-07-24 DIAGNOSIS — N89.8 VAGINAL IRRITATION: ICD-10-CM

## 2024-07-24 DIAGNOSIS — B37.31 CANDIDAL VAGINITIS: ICD-10-CM

## 2024-07-24 DIAGNOSIS — Z34.93 PRENATAL CARE, THIRD TRIMESTER: Primary | ICD-10-CM

## 2024-07-24 PROCEDURE — 99213 OFFICE O/P EST LOW 20 MIN: CPT | Performed by: ADVANCED PRACTICE MIDWIFE

## 2024-07-24 RX ORDER — FLUCONAZOLE 150 MG/1
TABLET ORAL
Qty: 3 TABLET | Refills: 1 | Status: SHIPPED | OUTPATIENT
Start: 2024-07-24 | End: 2024-10-22

## 2024-07-24 ASSESSMENT — ENCOUNTER SYMPTOMS
VOMITING: 0
DIARRHEA: 0
SHORTNESS OF BREATH: 0
NAUSEA: 0
ABDOMINAL PAIN: 0
CONSTIPATION: 0

## 2024-07-24 NOTE — PROGRESS NOTES
SUBJECTIVE:  Denies bleeding, spotting, leaking of fluid, abnormal discharge.  Good fetal movement.  Experiencing frequent watery discharge with mild itching/irritation.  Keith Man are also more frequent and more painful than usual.    Review of Systems   Eyes:  Negative for visual disturbance.   Respiratory:  Negative for shortness of breath.    Gastrointestinal:  Negative for abdominal pain, constipation, diarrhea, nausea and vomiting.   Genitourinary:  Positive for vaginal discharge and vaginal pain (Irritation, Itching). Negative for dysuria and vaginal bleeding.   Neurological:  Negative for headaches.     OBJECTIVE:  Physical Exam  Constitutional:       General: She is not in acute distress.     Appearance: Normal appearance.   Cardiovascular:      Rate and Rhythm: Normal rate and regular rhythm.   Pulmonary:      Effort: Pulmonary effort is normal. No respiratory distress.   Abdominal:      Palpations: Abdomen is soft.      Tenderness: There is no abdominal tenderness.      Hernia: There is no hernia in the left inguinal area or right inguinal area.   Genitourinary:     Labia:         Right: No rash, tenderness, lesion or injury.         Left: No rash, tenderness, lesion or injury.       Urethra: No prolapse, urethral pain, urethral swelling or urethral lesion.      Vagina: No signs of injury and foreign body. Vaginal discharge and erythema present. No tenderness, bleeding, lesions or prolapsed vaginal walls.      Cervix: No cervical motion tenderness, discharge, friability, lesion, erythema, cervical bleeding or eversion.      Uterus: Not tender.       Rectum: No mass or external hemorrhoid.         Musculoskeletal:         General: No swelling.      Right lower leg: No edema.      Left lower leg: No edema.   Lymphadenopathy:      Lower Body: No right inguinal adenopathy. No left inguinal adenopathy.   Skin:     General: Skin is warm and dry.      Capillary Refill: Capillary refill takes less than 2

## 2024-07-25 LAB
CLUE CELLS VAG QL WET PREP: ABNORMAL
T VAGINALIS VAG QL WET PREP: ABNORMAL
TRICHOMONAS VAGINALIS SCREEN: NEGATIVE
YEAST VAG QL WET PREP: ABNORMAL

## 2024-07-26 LAB
C TRACH DNA CVX QL NAA+PROBE: NEGATIVE
N GONORRHOEA DNA CERV MUCUS QL NAA+PROBE: NEGATIVE

## 2024-08-08 ENCOUNTER — ROUTINE PRENATAL (OUTPATIENT)
Dept: OBGYN CLINIC | Age: 24
End: 2024-08-08

## 2024-08-08 VITALS
HEART RATE: 73 BPM | SYSTOLIC BLOOD PRESSURE: 100 MMHG | WEIGHT: 145 LBS | BODY MASS INDEX: 24.13 KG/M2 | DIASTOLIC BLOOD PRESSURE: 60 MMHG

## 2024-08-08 DIAGNOSIS — F12.90 MARIJUANA USE DURING PREGNANCY: ICD-10-CM

## 2024-08-08 DIAGNOSIS — Z34.93 PRENATAL CARE, THIRD TRIMESTER: Primary | ICD-10-CM

## 2024-08-08 DIAGNOSIS — Z3A.34 34 WEEKS GESTATION OF PREGNANCY: ICD-10-CM

## 2024-08-08 DIAGNOSIS — O99.320 MARIJUANA USE DURING PREGNANCY: ICD-10-CM

## 2024-08-08 PROCEDURE — 99213 OFFICE O/P EST LOW 20 MIN: CPT | Performed by: OBSTETRICS & GYNECOLOGY

## 2024-08-08 NOTE — PROGRESS NOTES
Patient's last menstrual period was 12/11/2023 (within days).  Please reference prenatal and OB flow chart for further information  PT here today for routine prenatal care  Pt endorses fetal movement and denies loss of fluid, contractions or vaginal bleeding  No complaints.  Positive fetal movement  ROS:  Pt denies headache, vision changes, right upper quadrant pain, dysuria, or nausea/vomiting,     PE:  /60   Pulse 73   Wt 65.8 kg (145 lb)   LMP 12/11/2023 (Within Days)   BMI 24.13 kg/m²   Gen - Alert and oriented x 3  HEENT- NC/AT, CVS - RRR, Lungs - CTAB  Abd - FH 34        ASSESSMENT AND PLAN:   IUP at 34 weeks and 3 days  Patient to return in 1 week

## 2024-08-15 ENCOUNTER — ROUTINE PRENATAL (OUTPATIENT)
Dept: OBGYN CLINIC | Age: 24
End: 2024-08-15

## 2024-08-15 VITALS
DIASTOLIC BLOOD PRESSURE: 60 MMHG | HEART RATE: 76 BPM | SYSTOLIC BLOOD PRESSURE: 108 MMHG | BODY MASS INDEX: 24.13 KG/M2 | WEIGHT: 145 LBS

## 2024-08-15 DIAGNOSIS — Z3A.35 35 WEEKS GESTATION OF PREGNANCY: ICD-10-CM

## 2024-08-15 DIAGNOSIS — F12.90 MARIJUANA USE DURING PREGNANCY: ICD-10-CM

## 2024-08-15 DIAGNOSIS — O99.320 MARIJUANA USE DURING PREGNANCY: ICD-10-CM

## 2024-08-15 DIAGNOSIS — Z34.93 PRENATAL CARE, THIRD TRIMESTER: Primary | ICD-10-CM

## 2024-08-15 RX ORDER — FAMOTIDINE 20 MG/1
20 TABLET, FILM COATED ORAL 2 TIMES DAILY
Qty: 60 TABLET | Refills: 3 | Status: SHIPPED | OUTPATIENT
Start: 2024-08-15

## 2024-08-15 RX ORDER — ONDANSETRON HYDROCHLORIDE 8 MG/1
8 TABLET, FILM COATED ORAL EVERY 8 HOURS PRN
Qty: 60 TABLET | Refills: 3 | Status: SHIPPED | OUTPATIENT
Start: 2024-08-15

## 2024-08-15 NOTE — PROGRESS NOTES
Patient's last menstrual period was 12/11/2023 (within days).  Please reference prenatal and OB flow chart for further information  PT here today for routine prenatal care  Pt endorses fetal movement and denies loss of fluid, contractions or vaginal bleeding  Patient complaining of nausea.  Positive fetal movement.  No contractions  ROS:  Pt denies headache, vision changes, right upper quadrant pain, dysuria, or nausea/vomiting,     PE:  /60   Pulse 76   Wt 65.8 kg (145 lb)   LMP 12/11/2023 (Within Days)   BMI 24.13 kg/m²   Gen - Alert and oriented x 3  HEENT- NC/AT, CVS - RRR, Lungs - CTAB  Abd - FH 35        ASSESSMENT AND PLAN:   IUP at 35 weeks and 3 days  GERD with nausea  Patient to be set up for her ultrasound.  Pepcid and Zofran sent  Follow-up in 1 week

## 2024-08-19 ENCOUNTER — HOSPITAL ENCOUNTER (OUTPATIENT)
Dept: ULTRASOUND IMAGING | Age: 24
Discharge: HOME | End: 2024-08-21
Attending: OBSTETRICS & GYNECOLOGY
Payer: COMMERCIAL

## 2024-08-19 DIAGNOSIS — Z34.93 PRENATAL CARE, THIRD TRIMESTER: ICD-10-CM

## 2024-08-19 PROCEDURE — 76815 OB US LIMITED FETUS(S): CPT

## 2024-08-21 ENCOUNTER — ROUTINE PRENATAL (OUTPATIENT)
Dept: OBGYN CLINIC | Age: 24
End: 2024-08-21
Payer: COMMERCIAL

## 2024-08-21 VITALS
HEART RATE: 85 BPM | SYSTOLIC BLOOD PRESSURE: 110 MMHG | WEIGHT: 150 LBS | BODY MASS INDEX: 24.96 KG/M2 | DIASTOLIC BLOOD PRESSURE: 80 MMHG

## 2024-08-21 DIAGNOSIS — Z3A.36 36 WEEKS GESTATION OF PREGNANCY: ICD-10-CM

## 2024-08-21 DIAGNOSIS — O47.00 PRETERM UTERINE CONTRACTIONS: ICD-10-CM

## 2024-08-21 DIAGNOSIS — Z34.93 PRENATAL CARE, THIRD TRIMESTER: Primary | ICD-10-CM

## 2024-08-21 PROCEDURE — 99213 OFFICE O/P EST LOW 20 MIN: CPT | Performed by: ADVANCED PRACTICE MIDWIFE

## 2024-08-21 PROCEDURE — 4004F PT TOBACCO SCREEN RCVD TLK: CPT | Performed by: ADVANCED PRACTICE MIDWIFE

## 2024-08-21 PROCEDURE — G8420 CALC BMI NORM PARAMETERS: HCPCS | Performed by: ADVANCED PRACTICE MIDWIFE

## 2024-08-21 PROCEDURE — G8427 DOCREV CUR MEDS BY ELIG CLIN: HCPCS | Performed by: ADVANCED PRACTICE MIDWIFE

## 2024-08-21 NOTE — PROGRESS NOTES
SUBJECTIVE:  Denies bleeding, spotting, leaking of fluid, abnormal discharge.  Good fetal movement  Starting feeling some regular cramping/mild contractions about an hour ago with some low back pain.    Review of Systems   Eyes:  Negative for visual disturbance.   Respiratory:  Negative for shortness of breath.    Gastrointestinal:  Negative for abdominal pain, constipation, diarrhea, nausea and vomiting.   Genitourinary:  Negative for dysuria, vaginal bleeding and vaginal discharge.   Neurological:  Negative for headaches.     OBJECTIVE:  Cervical Exam:  1/0%/-3, Midposition, Soft.  Cephalic.  GBS Collected.    24 US:  35w 4d, EDC 24.  Cephalic presentation, anatomy WNL.  EFW 2589 grams (5lb. 11oz) at 27%.  BELLA  11 cm.  Anterior placenta.  Cervical length 4.5 cm.    Physical Exam  Appearance:  Normal appearance  Cardiovascular:  Normal rate, Capillary refill less than 2 seconds  Pulmonary:  Normal effort, no distress  Abdominal:  No tenderness  MS:  No Swelling, No dependent edema  Skin:  Warm, dry  Neuro:  Alert and oriented x3, reflexes normal.  Psychiatric:  Normal mood and behavior    ASSESSMENT:  24 y.o. female  IUP at 36w2d  Fetal Anatomy WNL  Cephalic presentation  Reassuring cervical length  Appropriate interval growth - 27%    Patient Active Problem List    Diagnosis Date Noted    Marijuana use during pregnancy 2024    History of postpartum hemorrhage, currently pregnant 2024      Diagnosis Orders   1. Prenatal care, third trimester  Culture, Strep B Screen, Vaginal/Rectal      2. 36 weeks gestation of pregnancy        3.  uterine contractions  Culture, Urine    Urinalysis          PLAN:  GBS Collected  Labor precautions reviewed if contractions continue   Check UA with culture    Follow-Up  Return in about 1 week (around 2024) for Prenatal Care Visit.    DENISE Burch CNM

## 2024-08-22 DIAGNOSIS — O47.00 PRETERM UTERINE CONTRACTIONS: ICD-10-CM

## 2024-08-22 DIAGNOSIS — Z34.93 PRENATAL CARE, THIRD TRIMESTER: ICD-10-CM

## 2024-08-22 LAB
BACTERIA URNS QL MICRO: ABNORMAL /HPF
BILIRUB UR QL STRIP: NEGATIVE
CLARITY UR: ABNORMAL
COLOR UR: YELLOW
CRYSTALS URNS MICRO: ABNORMAL /HPF
EPI CELLS #/AREA URNS AUTO: ABNORMAL /HPF (ref 0–5)
GLUCOSE UR STRIP-MCNC: NEGATIVE MG/DL
HGB UR QL STRIP: NEGATIVE
HYALINE CASTS #/AREA URNS AUTO: ABNORMAL /HPF (ref 0–5)
KETONES UR STRIP-MCNC: NEGATIVE MG/DL
LEUKOCYTE ESTERASE UR QL STRIP: ABNORMAL
NITRITE UR QL STRIP: NEGATIVE
PH UR STRIP: 7 [PH] (ref 5–9)
PROT UR STRIP-MCNC: NEGATIVE MG/DL
RBC #/AREA URNS AUTO: ABNORMAL /HPF (ref 0–5)
SP GR UR STRIP: 1.02 (ref 1–1.03)
UROBILINOGEN UR STRIP-ACNC: 0.2 E.U./DL
WBC #/AREA URNS AUTO: ABNORMAL /HPF (ref 0–5)

## 2024-08-23 LAB — BACTERIA UR CULT: NORMAL

## 2024-08-24 ENCOUNTER — ROUTINE PRENATAL (OUTPATIENT)
Dept: OBGYN CLINIC | Age: 24
End: 2024-08-24

## 2024-08-24 VITALS
WEIGHT: 152 LBS | SYSTOLIC BLOOD PRESSURE: 106 MMHG | HEART RATE: 102 BPM | DIASTOLIC BLOOD PRESSURE: 72 MMHG | BODY MASS INDEX: 25.29 KG/M2

## 2024-08-24 DIAGNOSIS — Z3A.36 36 WEEKS GESTATION OF PREGNANCY: ICD-10-CM

## 2024-08-24 DIAGNOSIS — R10.2 PELVIC PRESSURE IN PREGNANCY: ICD-10-CM

## 2024-08-24 DIAGNOSIS — Z34.93 PRENATAL CARE, THIRD TRIMESTER: Primary | ICD-10-CM

## 2024-08-24 DIAGNOSIS — O26.899 PELVIC PRESSURE IN PREGNANCY: ICD-10-CM

## 2024-08-24 DIAGNOSIS — R11.0 NAUSEA: ICD-10-CM

## 2024-08-24 NOTE — PROGRESS NOTES
SUBJECTIVE:  Denies bleeding, spotting, leaking of fluid, abnormal discharge.  Good fetal movement  Over the last 24-36 hours she has been losing her mucus plug, feeling more pelvic pressure, some nausea, and contractions that did resolve yesterday.    Review of Systems   Eyes:  Negative for visual disturbance.   Respiratory:  Negative for shortness of breath.    Gastrointestinal:  Negative for abdominal pain, constipation, diarrhea, nausea and vomiting.   Genitourinary:  Negative for dysuria, vaginal bleeding and vaginal discharge.   Neurological:  Negative for headaches.     OBJECTIVE  Cervical Exam:   1 cm/25%/-3, Posterior, Soft, no show.  Cephalic    Group B Strep Culture   Date Value Ref Range Status   2024   Preliminary    Rule Out Grp.B Strep:  CULTURE IN PROGRESS  Performed at [address] [address]  ([phone]       Physical Exam  Appearance:  Normal appearance  Cardiovascular:  Normal rate, Capillary refill less than 2 seconds  Pulmonary:  Normal effort, no distress  Abdominal:  No tenderness  MS:  No Swelling, No dependent edema  Skin:  Warm, dry  Neuro:  Alert and oriented x3, reflexes normal.  Psychiatric:  Normal mood and behavior    ASSESSMENT:  24 y.o. female  IUP at 36w5d    Patient Active Problem List    Diagnosis Date Noted    Marijuana use during pregnancy 2024    History of postpartum hemorrhage, currently pregnant 2024      Diagnosis Orders   1. Prenatal care, third trimester        2. 36 weeks gestation of pregnancy        3. Nausea        4. Pelvic pressure in pregnancy            PLAN:  Use Zofran as needed  Take it easy, rest today/tomorrow     Follow-Up  Return in about 1 week (around 2024) for Prenatal Care Visit.    DENISE Burch CNM

## 2024-08-24 NOTE — PROGRESS NOTES
Pt was having some contactions after losing her mucous plug, she is now just having a lot of cramping, pressure , and nausea

## 2024-08-25 LAB — GP B STREP SPEC QL CULT: NORMAL

## 2024-08-29 ENCOUNTER — ROUTINE PRENATAL (OUTPATIENT)
Dept: OBGYN CLINIC | Age: 24
End: 2024-08-29
Payer: COMMERCIAL

## 2024-08-29 VITALS
WEIGHT: 151 LBS | BODY MASS INDEX: 25.13 KG/M2 | SYSTOLIC BLOOD PRESSURE: 102 MMHG | HEART RATE: 72 BPM | DIASTOLIC BLOOD PRESSURE: 68 MMHG

## 2024-08-29 DIAGNOSIS — Z34.93 PRENATAL CARE, THIRD TRIMESTER: Primary | ICD-10-CM

## 2024-08-29 DIAGNOSIS — Z3A.37 37 WEEKS GESTATION OF PREGNANCY: ICD-10-CM

## 2024-08-29 PROCEDURE — 99212 OFFICE O/P EST SF 10 MIN: CPT | Performed by: ADVANCED PRACTICE MIDWIFE

## 2024-08-29 PROCEDURE — 4004F PT TOBACCO SCREEN RCVD TLK: CPT | Performed by: ADVANCED PRACTICE MIDWIFE

## 2024-08-29 PROCEDURE — G8427 DOCREV CUR MEDS BY ELIG CLIN: HCPCS | Performed by: ADVANCED PRACTICE MIDWIFE

## 2024-08-29 PROCEDURE — G8419 CALC BMI OUT NRM PARAM NOF/U: HCPCS | Performed by: ADVANCED PRACTICE MIDWIFE

## 2024-08-29 NOTE — PROGRESS NOTES
SUBJECTIVE:  Denies bleeding, spotting, leaking of fluid, abnormal discharge.  Good fetal movement  Would like a membrane sweep today, otherwise prefers as natural approach as possible towards labor onset.    Review of Systems   Eyes:  Negative for visual disturbance.   Respiratory:  Negative for shortness of breath.    Gastrointestinal:  Negative for abdominal pain, constipation, diarrhea, nausea and vomiting.   Genitourinary:  Negative for dysuria, vaginal bleeding and vaginal discharge.   Neurological:  Negative for headaches.     OBJECTIVE  Cervical Exam:   2 cm/25%/-3, Posterior, Soft, moderate show.  Cephalic.  Membrane sweep done.    Group B Strep Culture   Date Value Ref Range Status   2024   Final    Rule Out Grp.B Strep:  NEGATIVE FOR GROUP B STREPTOCOCCI  Performed at [facility] [address] [place] [id number]  ([phone]       Physical Exam  Appearance:  Normal appearance  Cardiovascular:  Normal rate, Capillary refill less than 2 seconds  Pulmonary:  Normal effort, no distress  Abdominal:  No tenderness  MS:  No Swelling, No dependent edema  Skin:  Warm, dry  Neuro:  Alert and oriented x3, reflexes normal.  Psychiatric:  Normal mood and behavior    ASSESSMENT:  24 y.o. female  IUP at 37w3d    Patient Active Problem List    Diagnosis Date Noted    Marijuana use during pregnancy 2024    History of postpartum hemorrhage, currently pregnant 2024      Diagnosis Orders   1. Prenatal care, third trimester        2. 37 weeks gestation of pregnancy            PLAN:  Prefers to wait for natural labor onset     Follow-Up  Return in about 1 week (around 2024) for Prenatal Care Visit.    DENISE Burch CNM

## 2024-08-31 ENCOUNTER — HOSPITAL ENCOUNTER (INPATIENT)
Age: 24
LOS: 2 days | Discharge: HOME | DRG: 560 | End: 2024-09-02
Attending: ADVANCED PRACTICE MIDWIFE | Admitting: OBSTETRICS & GYNECOLOGY
Payer: COMMERCIAL

## 2024-08-31 ENCOUNTER — ANESTHESIA (OUTPATIENT)
Dept: LABOR AND DELIVERY | Age: 24
DRG: 560 | End: 2024-08-31
Payer: COMMERCIAL

## 2024-08-31 ENCOUNTER — ANESTHESIA EVENT (OUTPATIENT)
Dept: LABOR AND DELIVERY | Age: 24
DRG: 560 | End: 2024-08-31
Payer: COMMERCIAL

## 2024-08-31 PROBLEM — Z37.9 NORMAL LABOR: Status: ACTIVE | Noted: 2024-08-31

## 2024-08-31 LAB
ABO + RH BLD: NORMAL
ALBUMIN SERPL-MCNC: 3.4 G/DL (ref 3.5–4.6)
ALP SERPL-CCNC: 133 U/L (ref 40–130)
ALT SERPL-CCNC: 6 U/L (ref 0–33)
AMPHET UR QL SCN: NORMAL
ANION GAP SERPL CALCULATED.3IONS-SCNC: 12 MEQ/L (ref 9–15)
AST SERPL-CCNC: 14 U/L (ref 0–35)
BACTERIA URNS QL MICRO: NEGATIVE /HPF
BARBITURATES UR QL SCN: NORMAL
BASOPHILS # BLD: 0 K/UL (ref 0–0.2)
BASOPHILS NFR BLD: 0.2 %
BENZODIAZ UR QL SCN: NORMAL
BILIRUB SERPL-MCNC: 0.3 MG/DL (ref 0.2–0.7)
BILIRUB UR QL STRIP: NEGATIVE
BLD GP AB SCN SERPL QL: NORMAL
BUN SERPL-MCNC: 7 MG/DL (ref 6–20)
CALCIUM SERPL-MCNC: 9.4 MG/DL (ref 8.5–9.9)
CANNABINOIDS UR QL SCN: NORMAL
CHLORIDE SERPL-SCNC: 104 MEQ/L (ref 95–107)
CLARITY UR: CLEAR
CO2 SERPL-SCNC: 22 MEQ/L (ref 20–31)
COCAINE UR QL SCN: NORMAL
COLOR UR: YELLOW
CREAT SERPL-MCNC: 0.7 MG/DL (ref 0.5–0.9)
DRUG SCREEN COMMENT UR-IMP: NORMAL
EOSINOPHIL # BLD: 0.1 K/UL (ref 0–0.7)
EOSINOPHIL NFR BLD: 0.5 %
EPI CELLS #/AREA URNS AUTO: NORMAL /HPF (ref 0–5)
ERYTHROCYTE [DISTWIDTH] IN BLOOD BY AUTOMATED COUNT: 13.8 % (ref 11.5–14.5)
FENTANYL SCREEN, URINE: NORMAL
GLOBULIN SER CALC-MCNC: 3.1 G/DL (ref 2.3–3.5)
GLUCOSE SERPL-MCNC: 70 MG/DL (ref 70–99)
GLUCOSE UR STRIP-MCNC: NEGATIVE MG/DL
HCT VFR BLD AUTO: 32.2 % (ref 37–47)
HGB BLD-MCNC: 10.4 G/DL (ref 12–16)
HGB UR QL STRIP: NEGATIVE
HIV AG/AB: NONREACTIVE
HYALINE CASTS #/AREA URNS AUTO: NORMAL /HPF (ref 0–5)
KETONES UR STRIP-MCNC: NEGATIVE MG/DL
LEUKOCYTE ESTERASE UR QL STRIP: ABNORMAL
LYMPHOCYTES # BLD: 1.4 K/UL (ref 1–4.8)
LYMPHOCYTES NFR BLD: 13.5 %
MCH RBC QN AUTO: 26.3 PG (ref 27–31.3)
MCHC RBC AUTO-ENTMCNC: 32.3 % (ref 33–37)
MCV RBC AUTO: 81.5 FL (ref 79.4–94.8)
METHADONE UR QL SCN: NORMAL
MONOCYTES # BLD: 0.7 K/UL (ref 0.2–0.8)
MONOCYTES NFR BLD: 6.3 %
NEUTROPHILS # BLD: 8.2 K/UL (ref 1.4–6.5)
NEUTS SEG NFR BLD: 78.9 %
NITRITE UR QL STRIP: NEGATIVE
OPIATES UR QL SCN: NORMAL
OXYCODONE UR QL SCN: NORMAL
PCP UR QL SCN: NORMAL
PH UR STRIP: 7 [PH] (ref 5–9)
PLACENTA ALPHA MICROGLOBULIN-1: POSITIVE
PLATELET # BLD AUTO: 148 K/UL (ref 130–400)
POTASSIUM SERPL-SCNC: 4 MEQ/L (ref 3.4–4.9)
PROPOXYPH UR QL SCN: NORMAL
PROT SERPL-MCNC: 6.5 G/DL (ref 6.3–8)
PROT UR STRIP-MCNC: NEGATIVE MG/DL
RBC # BLD AUTO: 3.95 M/UL (ref 4.2–5.4)
RBC #/AREA URNS AUTO: NORMAL /HPF (ref 0–5)
RPR SER QL: NORMAL
SODIUM SERPL-SCNC: 138 MEQ/L (ref 135–144)
SP GR UR STRIP: 1.01 (ref 1–1.03)
URINE REFLEX TO CULTURE: ABNORMAL
UROBILINOGEN UR STRIP-ACNC: 0.2 E.U./DL
WBC # BLD AUTO: 10.3 K/UL (ref 4.8–10.8)
WBC #/AREA URNS AUTO: NORMAL /HPF (ref 0–5)

## 2024-08-31 PROCEDURE — 85025 COMPLETE CBC W/AUTO DIFF WBC: CPT

## 2024-08-31 PROCEDURE — 86850 RBC ANTIBODY SCREEN: CPT

## 2024-08-31 PROCEDURE — 2500000003 HC RX 250 WO HCPCS: Performed by: ADVANCED PRACTICE MIDWIFE

## 2024-08-31 PROCEDURE — 80307 DRUG TEST PRSMV CHEM ANLYZR: CPT

## 2024-08-31 PROCEDURE — 86592 SYPHILIS TEST NON-TREP QUAL: CPT

## 2024-08-31 PROCEDURE — 2500000003 HC RX 250 WO HCPCS: Performed by: NURSE ANESTHETIST, CERTIFIED REGISTERED

## 2024-08-31 PROCEDURE — 84112 EVAL AMNIOTIC FLUID PROTEIN: CPT

## 2024-08-31 PROCEDURE — 7200000001 HC VAGINAL DELIVERY

## 2024-08-31 PROCEDURE — 3700000025 EPIDURAL BLOCK: Performed by: NURSE ANESTHETIST, CERTIFIED REGISTERED

## 2024-08-31 PROCEDURE — 6360000002 HC RX W HCPCS: Performed by: ADVANCED PRACTICE MIDWIFE

## 2024-08-31 PROCEDURE — 80053 COMPREHEN METABOLIC PANEL: CPT

## 2024-08-31 PROCEDURE — 87389 HIV-1 AG W/HIV-1&-2 AB AG IA: CPT

## 2024-08-31 PROCEDURE — 1220000000 HC SEMI PRIVATE OB R&B

## 2024-08-31 PROCEDURE — 86900 BLOOD TYPING SEROLOGIC ABO: CPT

## 2024-08-31 PROCEDURE — 81001 URINALYSIS AUTO W/SCOPE: CPT

## 2024-08-31 PROCEDURE — 86901 BLOOD TYPING SEROLOGIC RH(D): CPT

## 2024-08-31 PROCEDURE — 2580000003 HC RX 258: Performed by: ADVANCED PRACTICE MIDWIFE

## 2024-08-31 PROCEDURE — 59409 OBSTETRICAL CARE: CPT | Performed by: ADVANCED PRACTICE MIDWIFE

## 2024-08-31 PROCEDURE — 99285 EMERGENCY DEPT VISIT HI MDM: CPT

## 2024-08-31 RX ORDER — SODIUM CHLORIDE, SODIUM LACTATE, POTASSIUM CHLORIDE, AND CALCIUM CHLORIDE .6; .31; .03; .02 G/100ML; G/100ML; G/100ML; G/100ML
500 INJECTION, SOLUTION INTRAVENOUS PRN
Status: DISCONTINUED | OUTPATIENT
Start: 2024-08-31 | End: 2024-09-01

## 2024-08-31 RX ORDER — ACETAMINOPHEN 325 MG/1
650 TABLET ORAL EVERY 4 HOURS PRN
Status: DISCONTINUED | OUTPATIENT
Start: 2024-08-31 | End: 2024-09-02 | Stop reason: HOSPADM

## 2024-08-31 RX ORDER — SODIUM CHLORIDE 0.9 % (FLUSH) 0.9 %
5-40 SYRINGE (ML) INJECTION EVERY 12 HOURS SCHEDULED
Status: DISCONTINUED | OUTPATIENT
Start: 2024-08-31 | End: 2024-09-01

## 2024-08-31 RX ORDER — SODIUM CHLORIDE, SODIUM LACTATE, POTASSIUM CHLORIDE, CALCIUM CHLORIDE 600; 310; 30; 20 MG/100ML; MG/100ML; MG/100ML; MG/100ML
INJECTION, SOLUTION INTRAVENOUS CONTINUOUS
Status: DISCONTINUED | OUTPATIENT
Start: 2024-08-31 | End: 2024-09-01

## 2024-08-31 RX ORDER — NALOXONE HYDROCHLORIDE 0.4 MG/ML
INJECTION, SOLUTION INTRAMUSCULAR; INTRAVENOUS; SUBCUTANEOUS PRN
Status: DISCONTINUED | OUTPATIENT
Start: 2024-08-31 | End: 2024-09-01

## 2024-08-31 RX ORDER — METHYLERGONOVINE MALEATE 0.2 MG/ML
200 INJECTION INTRAVENOUS PRN
Status: DISCONTINUED | OUTPATIENT
Start: 2024-08-31 | End: 2024-09-01

## 2024-08-31 RX ORDER — NALBUPHINE HYDROCHLORIDE 20 MG/ML
10 INJECTION, SOLUTION INTRAMUSCULAR; INTRAVENOUS; SUBCUTANEOUS
Status: DISCONTINUED | OUTPATIENT
Start: 2024-08-31 | End: 2024-09-01

## 2024-08-31 RX ORDER — SODIUM CHLORIDE 9 MG/ML
INJECTION, SOLUTION INTRAVENOUS
Status: DISCONTINUED
Start: 2024-08-31 | End: 2024-09-01 | Stop reason: WASHOUT

## 2024-08-31 RX ORDER — LIDOCAINE HYDROCHLORIDE AND EPINEPHRINE BITARTRATE 20; .01 MG/ML; MG/ML
INJECTION, SOLUTION SUBCUTANEOUS PRN
Status: DISCONTINUED | OUTPATIENT
Start: 2024-08-31 | End: 2024-09-01 | Stop reason: SDUPTHER

## 2024-08-31 RX ORDER — ONDANSETRON 2 MG/ML
4 INJECTION INTRAMUSCULAR; INTRAVENOUS EVERY 6 HOURS PRN
Status: DISCONTINUED | OUTPATIENT
Start: 2024-08-31 | End: 2024-09-02 | Stop reason: HOSPADM

## 2024-08-31 RX ORDER — CARBOPROST TROMETHAMINE 250 UG/ML
250 INJECTION, SOLUTION INTRAMUSCULAR PRN
Status: DISCONTINUED | OUTPATIENT
Start: 2024-08-31 | End: 2024-09-01

## 2024-08-31 RX ORDER — METHYLERGONOVINE MALEATE 0.2 MG/ML
INJECTION INTRAVENOUS
Status: DISCONTINUED
Start: 2024-08-31 | End: 2024-09-01 | Stop reason: WASHOUT

## 2024-08-31 RX ORDER — SODIUM CHLORIDE 0.9 % (FLUSH) 0.9 %
5-40 SYRINGE (ML) INJECTION PRN
Status: DISCONTINUED | OUTPATIENT
Start: 2024-08-31 | End: 2024-09-01

## 2024-08-31 RX ORDER — SODIUM CHLORIDE, SODIUM LACTATE, POTASSIUM CHLORIDE, AND CALCIUM CHLORIDE .6; .31; .03; .02 G/100ML; G/100ML; G/100ML; G/100ML
1000 INJECTION, SOLUTION INTRAVENOUS PRN
Status: DISCONTINUED | OUTPATIENT
Start: 2024-08-31 | End: 2024-09-01

## 2024-08-31 RX ORDER — SODIUM CHLORIDE 9 MG/ML
INJECTION, SOLUTION INTRAVENOUS PRN
Status: DISCONTINUED | OUTPATIENT
Start: 2024-08-31 | End: 2024-09-02 | Stop reason: HOSPADM

## 2024-08-31 RX ORDER — CARBOPROST TROMETHAMINE 250 UG/ML
INJECTION, SOLUTION INTRAMUSCULAR
Status: DISCONTINUED
Start: 2024-08-31 | End: 2024-09-01 | Stop reason: WASHOUT

## 2024-08-31 RX ORDER — OXYTOCIN 10 [USP'U]/ML
INJECTION, SOLUTION INTRAMUSCULAR; INTRAVENOUS
Status: DISCONTINUED
Start: 2024-08-31 | End: 2024-09-01 | Stop reason: WASHOUT

## 2024-08-31 RX ORDER — MISOPROSTOL 200 UG/1
400 TABLET ORAL PRN
Status: DISCONTINUED | OUTPATIENT
Start: 2024-08-31 | End: 2024-09-01

## 2024-08-31 RX ORDER — MISOPROSTOL 200 UG/1
TABLET ORAL
Status: DISCONTINUED
Start: 2024-08-31 | End: 2024-09-01 | Stop reason: WASHOUT

## 2024-08-31 RX ORDER — ONDANSETRON 4 MG/1
4 TABLET, ORALLY DISINTEGRATING ORAL EVERY 6 HOURS PRN
Status: DISCONTINUED | OUTPATIENT
Start: 2024-08-31 | End: 2024-09-02 | Stop reason: HOSPADM

## 2024-08-31 RX ORDER — DOCUSATE SODIUM 100 MG/1
100 CAPSULE, LIQUID FILLED ORAL 2 TIMES DAILY PRN
Status: DISCONTINUED | OUTPATIENT
Start: 2024-08-31 | End: 2024-09-02 | Stop reason: HOSPADM

## 2024-08-31 RX ADMIN — Medication 5 ML: at 14:40

## 2024-08-31 RX ADMIN — SODIUM CHLORIDE, POTASSIUM CHLORIDE, SODIUM LACTATE AND CALCIUM CHLORIDE: 600; 310; 30; 20 INJECTION, SOLUTION INTRAVENOUS at 14:59

## 2024-08-31 RX ADMIN — Medication 87.3 MILLI-UNITS/MIN: at 23:22

## 2024-08-31 RX ADMIN — SODIUM CHLORIDE, POTASSIUM CHLORIDE, SODIUM LACTATE AND CALCIUM CHLORIDE 1000 ML: 600; 310; 30; 20 INJECTION, SOLUTION INTRAVENOUS at 11:26

## 2024-08-31 RX ADMIN — SODIUM CHLORIDE, POTASSIUM CHLORIDE, SODIUM LACTATE AND CALCIUM CHLORIDE: 600; 310; 30; 20 INJECTION, SOLUTION INTRAVENOUS at 13:21

## 2024-08-31 RX ADMIN — TRANEXAMIC ACID 1000 MG: 100 INJECTION, SOLUTION INTRAVENOUS at 23:47

## 2024-08-31 RX ADMIN — LIDOCAINE HYDROCHLORIDE AND EPINEPHRINE BITARTRATE 3 ML: 20; .01 INJECTION, SOLUTION SUBCUTANEOUS at 14:35

## 2024-08-31 RX ADMIN — Medication 10 ML/HR: at 14:45

## 2024-08-31 RX ADMIN — Medication 1 MILLI-UNITS/MIN: at 21:09

## 2024-08-31 RX ADMIN — SODIUM CHLORIDE, POTASSIUM CHLORIDE, SODIUM LACTATE AND CALCIUM CHLORIDE: 600; 310; 30; 20 INJECTION, SOLUTION INTRAVENOUS at 21:19

## 2024-08-31 ASSESSMENT — PAIN DESCRIPTION - LOCATION
LOCATION: ABDOMEN
LOCATION: ABDOMEN

## 2024-08-31 ASSESSMENT — PAIN DESCRIPTION - ORIENTATION
ORIENTATION: ANTERIOR;LOWER
ORIENTATION: ANTERIOR;LOWER

## 2024-08-31 ASSESSMENT — PAIN SCALES - GENERAL
PAINLEVEL_OUTOF10: 0
PAINLEVEL_OUTOF10: 5
PAINLEVEL_OUTOF10: 4

## 2024-08-31 ASSESSMENT — PAIN DESCRIPTION - PAIN TYPE
TYPE: ACUTE PAIN
TYPE: ACUTE PAIN

## 2024-08-31 ASSESSMENT — PAIN DESCRIPTION - DESCRIPTORS
DESCRIPTORS: CRAMPING
DESCRIPTORS: CRAMPING

## 2024-08-31 ASSESSMENT — PAIN DESCRIPTION - FREQUENCY
FREQUENCY: INTERMITTENT
FREQUENCY: INTERMITTENT

## 2024-08-31 NOTE — ANESTHESIA PRE PROCEDURE
Type & Screen (If Applicable):  No results found for: \"LABABO\"    Drug/Infectious Status (If Applicable):  Lab Results   Component Value Date/Time    HEPCAB NONREACTIVE 02/08/2024 07:47 PM       COVID-19 Screening (If Applicable):   Lab Results   Component Value Date/Time    COVID19 Not Detected 02/15/2024 05:49 PM    COVID19 Not Detected 01/17/2021 01:24 AM           Anesthesia Evaluation  Patient summary reviewed   no history of anesthetic complications:   Airway: Mallampati: II  TM distance: >3 FB   Neck ROM: full  Mouth opening: > = 3 FB   Dental: normal exam         Pulmonary:Negative Pulmonary ROS and normal exam                               Cardiovascular:Negative CV ROS                      Neuro/Psych:   (+) psychiatric history: stable without treatmentdepression/anxiety             GI/Hepatic/Renal:   (+) GERD: no interval change          Endo/Other: Negative Endo/Other ROS                    Abdominal:             Vascular: negative vascular ROS.         Other Findings: gravid            Anesthesia Plan      epidural     ASA 2             Anesthetic plan and risks discussed with patient.    Use of blood products discussed with patient whom consented to blood products.    Plan discussed with attending.                    DENISE Gonzalez - CRNA   8/31/2024

## 2024-08-31 NOTE — PROGRESS NOTES
Call made to Kaykay to update her on patient's current SVE, contraction pattern, and patient being comfortable with epidural. No new orders received. She stated she will be in shortly and will speak with patient and start pitocin at that time.

## 2024-08-31 NOTE — FLOWSHEET NOTE
Patient presents to triage with possible SROM this morning at 0700, states clear fluid. Patient reports positive fetal movement and denies vaginal bleeding. Kaykay notified of patient arrival. Patient labs sent and placed on monitor at this time, Amnisure performed.

## 2024-08-31 NOTE — PROGRESS NOTES
Transferred ambulatory to 320 with steady gait. Oriented to room, call light, etc., verbalized understanding

## 2024-08-31 NOTE — FLOWSHEET NOTE
1420 pt sitting for her epidural  1420 time out  1426 CRNA cleansing back  1429 procedure start  1435 test dose  1436 procedure done  1441 patient wedged on her left side   1443 bolus given   1446 continues infusion at 10 ml/hr

## 2024-08-31 NOTE — PROGRESS NOTES
18 G started in LAC on 1st attempt. Labs drawn and sent to lab. Tolerated well. HL flushed and patent. Tolerated well

## 2024-08-31 NOTE — ANESTHESIA PROCEDURE NOTES
Epidural Block    Patient location during procedure: OB  Start time: 8/31/2024 2:20 PM  End time: 8/31/2024 2:35 PM  Reason for block: labor epidural  Staffing  Performed: resident/CRNA   Resident/CRNA: Aviva Mccrary APRN - CRNA  Performed by: Aviva Mccrary APRN - CRNA  Authorized by: Aviva Mccrary APRN - CRNA    Epidural  Patient position: sitting  Prep: Betadine  Patient monitoring: frequent blood pressure checks and continuous pulse ox  Approach: midline  Location: L4-5  Injection technique: GIUSEPPE saline  Provider prep: mask and sterile gloves  Needle  Needle type: Tuohy   Needle gauge: 17 G  Needle length: 3.5 in  Needle insertion depth: 7 cm  Catheter type: side hole  Catheter at skin depth: 12 cm  Test dose: negativeCatheter Secured: tegaderm  Assessment  Sensory level: T8  Hemodynamics: stable  Attempts: 1  Outcomes: uncomplicated and patient tolerated procedure well  Preanesthetic Checklist  Completed: patient identified, IV checked, site marked, risks and benefits discussed, surgical/procedural consents, equipment checked, pre-op evaluation, timeout performed, anesthesia consent given, oxygen available, monitors applied/VS acknowledged, fire risk safety assessment completed and verbalized and blood product R/B/A discussed and consented

## 2024-08-31 NOTE — PROGRESS NOTES
Kaykay ODOM CNM notified of pts arrival, SVE and + Amnisure. Orders received to admit. No augmentation at this time

## 2024-08-31 NOTE — FLOWSHEET NOTE
Kaykay Kruger at patient bedside. SVE 5cm. Kaykay said to put patient in annie pose to help bring baby down. Kaykay will recheck patient around 2100 to determine if pitocin is needed.

## 2024-08-31 NOTE — H&P
Negative for visual disturbance.   Respiratory:  Negative for shortness of breath.    Gastrointestinal:  Negative for abdominal pain, constipation, diarrhea, nausea and vomiting.   Genitourinary:  Negative for dysuria, vaginal bleeding and vaginal discharge.   Neurological:  Negative for headaches.       Physical Exam:     Vitals:  /73   Pulse (!) 107   Temp 98.2 °F (36.8 °C) (Oral)   Resp 16   LMP 12/11/2023 (Within Days)   SpO2 99%     Admission Cervical Exam:   2-3 cm/70%/-3, Posterior, Soft, no show.  Cephalic  Membranes:  Ruptured clear fluid (spontaneous)    Physical Exam  Appearance:  Normal appearance  Cardiovascular:  Normal rate, Capillary refill less than 2 seconds  Pulmonary:  Normal effort, no distress  Abdominal:  No tenderness  MS:  No Swelling, No dependent edema  Skin:  Warm, dry  Neuro:  Alert and oriented x3, reflexes normal.  Psychiatric:  Normal mood and behavior    Maternal Exam:   Abdomen: Patient reports no abdominal tenderness. Estimated fetal weight is 7 - 7 1/2 lbs..    Fetal presentation: vertex  Introitus: Normal vulva. Vulva is negative for lesion.   Normal vagina.  Vagina is negative for ulcerations.   Amniotic fluid character: clear.  Pelvis: adequate for delivery.        Fetal Exam  Fetal Monitor Review: Mode: ultrasound.    Variability: moderate (6-25 bpm).    Pattern: accelerations present and no decelerations.    Fetal State Assessment: Category I - tracings are normal.      Labs:  ABO/Rh   Date Value Ref Range Status   02/08/2024 O POS  Final     Antibody Screen   Date Value Ref Range Status   02/08/2024 NEG  Final     Hemoglobin   Date Value Ref Range Status   07/11/2024 10.7 (L) 12.0 - 16.0 g/dL Final     Hematocrit   Date Value Ref Range Status   07/11/2024 33.5 (L) 37.0 - 47.0 % Final     Platelets   Date Value Ref Range Status   07/11/2024 155 130 - 400 K/uL Final     Rubella Antibody IgG   Date Value Ref Range Status   02/08/2024 216.4 IU/mL Final     Comment:

## 2024-08-31 NOTE — FLOWSHEET NOTE
Patient in bed eating ice chips. Support person at bedside. Call light within reach. Patient states she doesn't feel her contractions anymore.

## 2024-08-31 NOTE — FLOWSHEET NOTE
Kaykay ODOM was at bedside at noon today and discussed plan of care with patient. Provider reviewed EFM strip. Questions/Concerns reviewed by provider. Patient verbalized understanding.  Patient stated she wanted an epidural. Orders received for an epidural when patient was ready.

## 2024-08-31 NOTE — PROGRESS NOTES
Department of Obstetrics and Gynecology  Labor and Delivery   Intrapartum Progress Note    SUBJECTIVE:     Fernanda is having regular contractions every 2-3 minutes that are moderate-firm in intensity.  She is comfortable with an epidural.      She is trying to avoid augmenting her labor with Pitocin, but understands that it may be needed if cervical change does not begin to occur with greater progression within the next few hours.    Discussed positioning her in high fowlers to see if this might better apply the head to the cervix, repeat SVE in about 90 minutes, and start Pitocin then if cervical change is still less than 6-7 cm.    History of postpartum hemorrhage, she is concerned this may happen again.  Discussed medications that can be given proactively to prevent/lower the likelihood of hemorrhage again with this delivery.    Review of Systems   Eyes:  Negative for visual disturbance.   Respiratory:  Negative for shortness of breath.    Gastrointestinal:  Negative for abdominal pain, constipation, diarrhea, nausea and vomiting.   Genitourinary:  Negative for dysuria, vaginal bleeding and vaginal discharge.   Neurological:  Negative for headaches.     OBJECTIVE:     Patient Vitals for the past 4 hrs:   BP Temp Temp src Pulse Resp SpO2   08/31/24 1948 -- -- -- -- -- 98 %   08/31/24 1943 -- -- -- -- -- 98 %   08/31/24 1941 108/69 98.5 °F (36.9 °C) Oral 71 16 98 %   08/31/24 1938 -- -- -- -- -- 98 %   08/31/24 1933 -- -- -- -- -- 97 %   08/31/24 1928 -- -- -- -- -- 98 %   08/31/24 1923 -- -- -- -- -- 98 %   08/31/24 1918 -- -- -- -- -- 99 %   08/31/24 1913 -- -- -- -- -- 98 %   08/31/24 1908 -- -- -- -- -- 98 %   08/31/24 1903 -- -- -- -- -- 98 %   08/31/24 1858 -- -- -- -- -- 98 %   08/31/24 1853 -- -- -- -- -- 98 %   08/31/24 1848 -- -- -- -- -- 98 %   08/31/24 1845 -- 97.8 °F (36.6 °C) Oral -- -- --   08/31/24 1843 -- -- -- -- -- 98 %   08/31/24 1838 -- -- -- -- -- 98 %   08/31/24 1833 -- -- -- -- -- 98 %

## 2024-09-01 LAB
BASOPHILS # BLD: 0 K/UL (ref 0–0.2)
BASOPHILS NFR BLD: 0.1 %
EOSINOPHIL # BLD: 0 K/UL (ref 0–0.7)
EOSINOPHIL NFR BLD: 0.1 %
ERYTHROCYTE [DISTWIDTH] IN BLOOD BY AUTOMATED COUNT: 13.9 % (ref 11.5–14.5)
HCT VFR BLD AUTO: 30.6 % (ref 37–47)
HGB BLD-MCNC: 10.1 G/DL (ref 12–16)
LYMPHOCYTES # BLD: 1.6 K/UL (ref 1–4.8)
LYMPHOCYTES NFR BLD: 9.8 %
MCH RBC QN AUTO: 26.5 PG (ref 27–31.3)
MCHC RBC AUTO-ENTMCNC: 33 % (ref 33–37)
MCV RBC AUTO: 80.3 FL (ref 79.4–94.8)
MONOCYTES # BLD: 1.1 K/UL (ref 0.2–0.8)
MONOCYTES NFR BLD: 7 %
NEUTROPHILS # BLD: 13.6 K/UL (ref 1.4–6.5)
NEUTS SEG NFR BLD: 82.5 %
PLATELET # BLD AUTO: 120 K/UL (ref 130–400)
RBC # BLD AUTO: 3.81 M/UL (ref 4.2–5.4)
WBC # BLD AUTO: 16.4 K/UL (ref 4.8–10.8)

## 2024-09-01 PROCEDURE — 1220000000 HC SEMI PRIVATE OB R&B

## 2024-09-01 PROCEDURE — 85025 COMPLETE CBC W/AUTO DIFF WBC: CPT

## 2024-09-01 PROCEDURE — 6370000000 HC RX 637 (ALT 250 FOR IP): Performed by: ADVANCED PRACTICE MIDWIFE

## 2024-09-01 PROCEDURE — 99024 POSTOP FOLLOW-UP VISIT: CPT | Performed by: ADVANCED PRACTICE MIDWIFE

## 2024-09-01 PROCEDURE — 36415 COLL VENOUS BLD VENIPUNCTURE: CPT

## 2024-09-01 RX ORDER — IBUPROFEN 800 MG/1
800 TABLET, FILM COATED ORAL EVERY 8 HOURS SCHEDULED
Status: DISCONTINUED | OUTPATIENT
Start: 2024-09-01 | End: 2024-09-02 | Stop reason: HOSPADM

## 2024-09-01 RX ORDER — CALCIUM CARBONATE 500 MG/1
1000 TABLET, CHEWABLE ORAL 3 TIMES DAILY PRN
Status: DISCONTINUED | OUTPATIENT
Start: 2024-09-01 | End: 2024-09-02 | Stop reason: HOSPADM

## 2024-09-01 RX ORDER — MODIFIED LANOLIN
OINTMENT (GRAM) TOPICAL PRN
Status: DISCONTINUED | OUTPATIENT
Start: 2024-09-01 | End: 2024-09-02 | Stop reason: HOSPADM

## 2024-09-01 RX ADMIN — IBUPROFEN 800 MG: 800 TABLET, FILM COATED ORAL at 10:20

## 2024-09-01 RX ADMIN — IBUPROFEN 800 MG: 800 TABLET, FILM COATED ORAL at 18:06

## 2024-09-01 RX ADMIN — IBUPROFEN 800 MG: 800 TABLET, FILM COATED ORAL at 02:35

## 2024-09-01 RX ADMIN — DOCUSATE SODIUM 100 MG: 100 CAPSULE, LIQUID FILLED ORAL at 08:50

## 2024-09-01 RX ADMIN — ACETAMINOPHEN 325MG 650 MG: 325 TABLET ORAL at 02:35

## 2024-09-01 ASSESSMENT — PAIN DESCRIPTION - LOCATION
LOCATION: ABDOMEN;BACK;VAGINA
LOCATION: ABDOMEN;VAGINA

## 2024-09-01 ASSESSMENT — PAIN DESCRIPTION - ORIENTATION
ORIENTATION: LOWER
ORIENTATION: LOWER

## 2024-09-01 ASSESSMENT — PAIN SCALES - GENERAL
PAINLEVEL_OUTOF10: 1
PAINLEVEL_OUTOF10: 2
PAINLEVEL_OUTOF10: 1

## 2024-09-01 ASSESSMENT — PAIN DESCRIPTION - DESCRIPTORS
DESCRIPTORS: ACHING;CRAMPING
DESCRIPTORS: ACHING;CRAMPING;SORE

## 2024-09-01 NOTE — L&D DELIVERY SUMMARY NOTE
Fernanda Knapp is a 24 y.o.  at 37w6d.  She was admitted to L&D 2024  8:13 AM     Her prenatal course was complicated by:  Patient Active Problem List    Diagnosis Date Noted    Normal labor 2024    Marijuana use during pregnancy 2024    History of postpartum hemorrhage, currently pregnant 2024       Induction Method:  None  Augmentation:  Pitocin  Pain Management: Epidural  Group B Strep:    Group B Strep Culture   Date Value Ref Range Status   2024   Final    Rule Out Grp.B Strep:  NEGATIVE FOR GROUP B STREPTOCOCCI  Performed at [facility] [address] [place] [phone]        A vigorous Female infant was spontaneously delivered and placed to mother's chest.  Cord clamping was delayed until no longer pulsating.     - APGAR's:  8, 9   - Nuchal Cord:  None   - Weight:   Information for the patient's :  Giselle Knapp [[id number]]   Birth Weight: N/A   Placenta:  Fernando, intact.  Episiotomy:  Not Performed  Perineum:  Intact  Other Laceration(s):  None    Vaginal sweep performed, sponge and instrument counts were verified upon procedure completion.  Due to her history of postpartum hemorrhage, she was proactively given a dose of TXA following her immediate postpartum Pitocin bolus.  Fundus remains firm, below umbilicus, bleeding is scant to small.       Complications:  None  Estimated Blood Loss:  200 ml    Placenta:  Discarded    Electronically signed by DENISE Burch CNM on 24 at 12:09 AM EDT

## 2024-09-01 NOTE — ANESTHESIA POSTPROCEDURE EVALUATION
Department of Anesthesiology  Postprocedure Note    Patient: Fernanda Knapp  MRN: [id number]  YOB: 2000  Date of evaluation: 9/1/2024    Procedure Summary       Date: 08/31/24 Room / Location:     Anesthesia Start: 1420 Anesthesia Stop: 2339    Procedure: Labor Analgesia Diagnosis:     Scheduled Providers:  Responsible Provider: Aviva Mccrary APRN - CRNA    Anesthesia Type: epidural ASA Status: 2            Anesthesia Type: No value filed.    Serge Phase I: Serge Score: 10    Serge Phase II:      Anesthesia Post Evaluation    Patient location during evaluation: bedside  Patient participation: complete - patient participated  Level of consciousness: awake and awake and alert  Pain score: 0  Airway patency: patent  Nausea & Vomiting: no nausea and no vomiting  Cardiovascular status: blood pressure returned to baseline and hemodynamically stable  Respiratory status: acceptable  Hydration status: euvolemic  Pain management: adequate        No notable events documented.

## 2024-09-01 NOTE — PROGRESS NOTES
Department of Obstetrics and Gynecology  Labor and Delivery   Vaginal Delivery Post-Partum Progress Note      Subjective:     Fernanda Knapp is a 24 y.o. female  at 37w5d    Postpartum Day 1: Vaginal Delivery on 24 with a baby girl.    The patient feels well. She denies emotional concerns, her pain is well controlled with current medications.  She is ambulating well and is tolerating a normal diet.     The baby is well and feeding well.    Objective:      Patient Vitals for the past 6 hrs:   BP Temp Temp src Pulse Resp SpO2   24 0358 (!) 102/57 98.4 °F (36.9 °C) Oral 94 16 98 %       General:    alert, cooperative, and no distress   Bowel Sounds:  active   Lochia:  appropriate   Uterine Fundus:   firm, with appropriate involution   Perineum:  Episiotomy:  None   Laceration:  None   DVT Evaluation:  No evidence of DVT seen on physical exam.     Labs:     Blood Type/Rh:  O POS       Group B Strep Culture   Date Value Ref Range Status   2024   Final    Rule Out Grp.B Strep:  NEGATIVE FOR GROUP B STREPTOCOCCI  Performed at [facility] [address] [place] [id number] ([phone]       Hemoglobin   Date Value Ref Range Status   2024 10.1 (L) 12.0 - 16.0 g/dL Final   2024 10.4 (L) 12.0 - 16.0 g/dL Final     Hematocrit   Date Value Ref Range Status   2024 30.6 (L) 37.0 - 47.0 % Final   2024 32.2 (L) 37.0 - 47.0 % Final     Rubella Antibody IgG   Date Value Ref Range Status   2024 216.4 IU/mL Final     Comment:     Patient's result indicates immunity.  Default Normal Ranges    >=10 Presumed Immune  <10  Presumed Not immune         Assessment:     Status post vaginal delivery  O POS  Rubella Immune  Doing well    Plan:     Anticipate discharge on 24 with baby.

## 2024-09-01 NOTE — FLOWSHEET NOTE
Patient up x2 assist with the jamarcus steady. Pt unable to put full weight on left leg currently d/t epidural. Denies feeling dizzy or lightheaded. Voided 500ml. One small clot noted. Vicky care provided. No concerns at this time.

## 2024-09-01 NOTE — ANESTHESIA POST-OP
Post Epidural follow up. Patient denies sensory or motor deficit of bilateral lower extremities, denies fever, pain is 0/10 She is up and around without difficulty. No redness or swelling at the lumbar site.

## 2024-09-02 VITALS
BODY MASS INDEX: 25.49 KG/M2 | WEIGHT: 153 LBS | SYSTOLIC BLOOD PRESSURE: 109 MMHG | RESPIRATION RATE: 16 BRPM | HEIGHT: 65 IN | TEMPERATURE: 98.1 F | HEART RATE: 63 BPM | OXYGEN SATURATION: 99 % | DIASTOLIC BLOOD PRESSURE: 71 MMHG

## 2024-09-02 PROCEDURE — 90471 IMMUNIZATION ADMIN: CPT | Performed by: ADVANCED PRACTICE MIDWIFE

## 2024-09-02 PROCEDURE — 90715 TDAP VACCINE 7 YRS/> IM: CPT | Performed by: ADVANCED PRACTICE MIDWIFE

## 2024-09-02 PROCEDURE — 6370000000 HC RX 637 (ALT 250 FOR IP): Performed by: ADVANCED PRACTICE MIDWIFE

## 2024-09-02 PROCEDURE — 6360000002 HC RX W HCPCS: Performed by: ADVANCED PRACTICE MIDWIFE

## 2024-09-02 RX ORDER — IBUPROFEN 800 MG/1
800 TABLET, FILM COATED ORAL 2 TIMES DAILY PRN
Qty: 60 TABLET | Refills: 0 | Status: SHIPPED | OUTPATIENT
Start: 2024-09-02

## 2024-09-02 RX ORDER — IBUPROFEN 800 MG/1
800 TABLET, FILM COATED ORAL 2 TIMES DAILY PRN
Qty: 60 TABLET | Refills: 0 | Status: SHIPPED | OUTPATIENT
Start: 2024-09-02 | End: 2024-09-02

## 2024-09-02 RX ADMIN — IBUPROFEN 800 MG: 800 TABLET, FILM COATED ORAL at 09:57

## 2024-09-02 RX ADMIN — TETANUS TOXOID, REDUCED DIPHTHERIA TOXOID AND ACELLULAR PERTUSSIS VACCINE, ADSORBED 0.5 ML: 5; 2.5; 8; 8; 2.5 SUSPENSION INTRAMUSCULAR at 09:13

## 2024-09-02 RX ADMIN — IBUPROFEN 800 MG: 800 TABLET, FILM COATED ORAL at 01:51

## 2024-09-02 ASSESSMENT — PAIN DESCRIPTION - ORIENTATION: ORIENTATION: LOWER

## 2024-09-02 ASSESSMENT — PAIN SCALES - GENERAL
PAINLEVEL_OUTOF10: 1
PAINLEVEL_OUTOF10: 1

## 2024-09-02 ASSESSMENT — PAIN DESCRIPTION - DESCRIPTORS: DESCRIPTORS: ACHING;CRAMPING;SORE

## 2024-09-02 ASSESSMENT — PAIN DESCRIPTION - LOCATION: LOCATION: ABDOMEN;BACK;VAGINA

## 2024-09-02 NOTE — CARE COORDINATION
Reason for consult: + THC Feb and 2024.     Baby Girl: Brook Cerrato   : 2024  FOB: Yo Cerrato   Address: [address] [place] [address] [place] [address]  Contact: [phone]    LSW completed assessment with pt via phone. Pt is alert and oriented x4. Pt lives at Home with FOB and daughter. This it pt's 2nd baby. Per pt report there is everything at home for baby. Clothing, diapers, formula, crib and car seat. No transportation or financial issues at this time. Pt is connected with WIC. Discussed other community resources with pt. Pt verbalized understanding. Friends and family is very supportive and able to help out with baby if need it.     Discussed positive THC with pt. Pt report, No medical marijuana card. Reason for used: sickness throughout pregnancy. Tried different medication it was not working. Unable to eat. Only smoked couple of time. At this time pt have quit smoking and is not planning on to smoke again.     Pt was appropriate during the assessment.  Pt is able to go home with baby at the time of DC.     Updated OB staff.     No concerns at this time.     LSW will follow.     Electronically signed by BRIE Harvey on 2024 at 9:10 AM

## 2024-09-02 NOTE — DISCHARGE SUMMARY
Admission Date: 8/31/2024  8:13 AM  Discharge date: 9/2/2024    Condition at discharge: stable  Discharged: Home       Postpartum Day 2 :    Spontaneous vaginal Delivery     The patient feels well. The patient denies emotional concerns. Pain is well controlled with current medications. Urinary output is adequate. The patient is ambulating well. The patient is tolerating a normal diet. Flatus has been passed.    Objective:       Vitals:    09/02/24 0534   BP: 110/66   Pulse: 58   Resp: 16   Temp: 98.1 °F (36.7 °C)   SpO2: 99%          General:    alert, appears stated age and cooperative   Bowel Sounds:  active   Lochia:  appropriate   Uterine Fundus:   firm       DVT Evaluation:  No evidence of DVT seen on physical exam.     No intake/output data recorded.   No intake/output data recorded.     Lab Results   Component Value Date    WBC 16.4 (H) 09/01/2024    HGB 10.1 (L) 09/01/2024    HCT 30.6 (L) 09/01/2024    MCV 80.3 09/01/2024     (L) 09/01/2024        Assessment:     Status Postpartum Day 2:     Spontaneous vaginal Delivery  . Doing well postpartum .     Plan:        Discharge home with standard precautions and return to clinic in 4-6 weeks.  Counseled for Follow up with OB in 6 weeks  Counseled to come back to ER if fever, Vomiting, Heavy vaginal  bleeding, CP or SOB     Jacob Martin MD.

## 2024-09-02 NOTE — PLAN OF CARE
Problem: Vaginal Birth or  Section  Goal: Fetal and maternal status remain reassuring during the birth process  Description:  Birth OB-Pregnancy care plan goal which identifies if the fetal and maternal status remain reassuring during the birth process  2024 by Kamilla Luis RN  Outcome: Progressing  2024 by Nadeen Ramos RN  Outcome: Progressing  2024 by Nadeen Ramos RN  Outcome: Progressing     Problem: Pain  Goal: Verbalizes/displays adequate comfort level or baseline comfort level  2024 by Kamilla Luis RN  Outcome: Progressing  2024 by Nadeen Ramos RN  Outcome: Progressing  2024 by Nadeen Ramos RN  Outcome: Progressing     Problem: Safety - Adult  Goal: Free from fall injury  2024 by Kamilla Luis RN  Outcome: Progressing  2024 by Nadeen Ramos RN  Outcome: Progressing  2024 by Nadeen Ramos RN  Outcome: Progressing     Problem: Infection - Adult  Goal: Absence of infection at discharge  2024 by Kamilla Luis RN  Outcome: Progressing  2024 by Nadeen Ramos RN  Outcome: Progressing  Goal: Absence of infection during hospitalization  2024 by Kamilla Luis RN  Outcome: Progressing  2024 by Nadeen Ramos RN  Outcome: Progressing     
  Problem: Vaginal Birth or  Section  Goal: Fetal and maternal status remain reassuring during the birth process  Description:  Birth OB-Pregnancy care plan goal which identifies if the fetal and maternal status remain reassuring during the birth process  2024 by Nadeen Ramos RN  Outcome: Progressing  2024 by Nadeen Ramos RN  Outcome: Progressing  2024 1503 by Francisco Cruz RN  Outcome: Progressing  2024 1022 by Shila Bradshaw RN  Outcome: Progressing     Problem: Pain  Goal: Verbalizes/displays adequate comfort level or baseline comfort level  2024 by Nadeen Ramos RN  Outcome: Progressing  2024 by Nadeen Ramos RN  Outcome: Progressing  2024 1503 by Francisco Cruz RN  Outcome: Progressing  2024 1022 by Shila Bradshaw RN  Outcome: Progressing     Problem: Safety - Adult  Goal: Free from fall injury  2024 by Nadeen Ramos RN  Outcome: Progressing  2024 by Nadeen Ramos RN  Outcome: Progressing  2024 1503 by Francisco Cruz RN  Outcome: Progressing  2024 1022 by Shila Bradshaw RN  Outcome: Progressing     Problem: Infection - Adult  Goal: Absence of infection at discharge  Outcome: Progressing  Goal: Absence of infection during hospitalization  Outcome: Progressing     
  Problem: Vaginal Birth or  Section  Goal: Fetal and maternal status remain reassuring during the birth process  Description:  Birth OB-Pregnancy care plan goal which identifies if the fetal and maternal status remain reassuring during the birth process  Outcome: Progressing     Problem: Pain  Goal: Verbalizes/displays adequate comfort level or baseline comfort level  Outcome: Progressing     Problem: Safety - Adult  Goal: Free from fall injury  Outcome: Progressing     
Progressing  Goal: Incisions, wounds, or drain sites healing without S/S of infection  Outcome: Progressing     Problem: Discharge Planning  Goal: Discharge to home or other facility with appropriate resources  Outcome: Progressing     
bonding  Description:  Postpartum OB-Pregnancy care plan goal which identifies if the mother and  are bonding appropriately  2024 by Kamilla Luis, RN  Outcome: Completed  2024 by Kamilla Luis RN  Outcome: Progressing  2024 by Bib Guillory RN  Outcome: Progressing  Goal: Establishment of infant feeding pattern  Description:  Postpartum OB-Pregnancy care plan goal which identifies if the mother is establishing a feeding pattern with their   2024 by Kamilla Luis, RN  Outcome: Completed  2024 by Kamilla Luis, RN  Outcome: Progressing  2024 by Bib Guillory RN  Outcome: Progressing  Goal: Incisions, wounds, or drain sites healing without S/S of infection  2024 by Kamilla Luis, RN  Outcome: Completed  2024 by Kamilla Luis, RN  Outcome: Progressing  2024 by Bib Guillory, RN  Outcome: Progressing     Problem: Discharge Planning  Goal: Discharge to home or other facility with appropriate resources  2024 by Kamilla Luis, RN  Outcome: Completed  2024 by Kamilla Luis, RN  Outcome: Progressing  2024 by Bib Guillory RN  Outcome: Progressing     
infection  9/2/2024 0732 by Kamilla Luis, RN  Outcome: Progressing  9/1/2024 2111 by Bib Guillory, RN  Outcome: Progressing     Problem: Discharge Planning  Goal: Discharge to home or other facility with appropriate resources  9/2/2024 0732 by Kamilla Luis, RN  Outcome: Progressing  9/1/2024 2111 by Bib Guillory, RN  Outcome: Progressing

## 2024-09-11 ENCOUNTER — OFFICE VISIT (OUTPATIENT)
Dept: OBGYN CLINIC | Age: 24
End: 2024-09-11
Payer: COMMERCIAL

## 2024-09-11 VITALS
DIASTOLIC BLOOD PRESSURE: 70 MMHG | WEIGHT: 124 LBS | BODY MASS INDEX: 20.63 KG/M2 | SYSTOLIC BLOOD PRESSURE: 112 MMHG | HEART RATE: 64 BPM

## 2024-09-11 DIAGNOSIS — N94.6 DYSMENORRHEA: ICD-10-CM

## 2024-09-11 DIAGNOSIS — Z30.013 ENCOUNTER FOR INITIAL PRESCRIPTION OF INJECTABLE CONTRACEPTIVE: ICD-10-CM

## 2024-09-11 PROCEDURE — 96372 THER/PROPH/DIAG INJ SC/IM: CPT | Performed by: ADVANCED PRACTICE MIDWIFE

## 2024-09-11 RX ORDER — MEDROXYPROGESTERONE ACETATE 150 MG/ML
150 INJECTION, SUSPENSION INTRAMUSCULAR ONCE
Status: COMPLETED | OUTPATIENT
Start: 2024-09-11 | End: 2024-09-11

## 2024-09-11 RX ADMIN — MEDROXYPROGESTERONE ACETATE 150 MG: 150 INJECTION, SUSPENSION INTRAMUSCULAR at 18:04

## 2024-09-11 ASSESSMENT — ENCOUNTER SYMPTOMS
VOMITING: 0
CONSTIPATION: 0
DIARRHEA: 0
NAUSEA: 0
ABDOMINAL PAIN: 0
SHORTNESS OF BREATH: 0

## 2024-10-16 ENCOUNTER — OFFICE VISIT (OUTPATIENT)
Dept: OBGYN CLINIC | Age: 24
End: 2024-10-16

## 2024-10-16 VITALS
BODY MASS INDEX: 19.97 KG/M2 | HEART RATE: 71 BPM | SYSTOLIC BLOOD PRESSURE: 120 MMHG | DIASTOLIC BLOOD PRESSURE: 72 MMHG | WEIGHT: 120 LBS

## 2024-10-16 PROCEDURE — 99024 POSTOP FOLLOW-UP VISIT: CPT | Performed by: ADVANCED PRACTICE MIDWIFE

## 2024-10-17 PROBLEM — F12.90 MARIJUANA USE DURING PREGNANCY: Status: RESOLVED | Noted: 2024-02-12 | Resolved: 2024-10-17

## 2024-10-17 PROBLEM — O09.299 HISTORY OF POSTPARTUM HEMORRHAGE, CURRENTLY PREGNANT: Status: RESOLVED | Noted: 2024-02-08 | Resolved: 2024-10-17

## 2024-10-17 PROBLEM — O99.320 MARIJUANA USE DURING PREGNANCY: Status: RESOLVED | Noted: 2024-02-12 | Resolved: 2024-10-17

## 2024-10-17 ASSESSMENT — ENCOUNTER SYMPTOMS
CONSTIPATION: 0
DIARRHEA: 0
ABDOMINAL PAIN: 0
COUGH: 0
NAUSEA: 0
VOMITING: 0
SHORTNESS OF BREATH: 0

## 2024-10-17 NOTE — PROGRESS NOTES
SUBJECTIVE:  Fernanda Knapp is a 24 y.o. female  who had a Vaginal Delivery on 24 with a baby girl.  She is about 7 weeks postpartum.    She feels well, denies emotional concerns.  The baby is well and feeding well.      Postpartum bleeding has never fully resolved. Initially it seemed like it was going to resolve, but then started again.  It is difficult to tell if this is breakthrough bleeding with Depo Provera, continued postpartum bleeding, or her first postpartum period.    Review of Systems   Respiratory:  Negative for cough and shortness of breath.    Gastrointestinal:  Negative for abdominal pain, constipation, diarrhea, nausea and vomiting.   Genitourinary:  Positive for menstrual problem. Negative for difficulty urinating, dysuria, pelvic pain, vaginal bleeding and vaginal discharge.   All other systems reviewed and are negative.    OBJECTIVE:  Vitals:  /72   Pulse 71   Wt 54.4 kg (120 lb)   LMP 2023 (Within Days)   Breastfeeding No   BMI 19.97 kg/m²     OB Depression Screening:  In the past 7 days:  I have been able to laugh and see the funny side of things: As much as I always could  I have looked forward with enjoyment to things: As much as I ever did  I have blamed myself unnecessarily when things went wrong: No, never  I have been anxious or worried for no good reason: No, not at all  I have felt scared or panicky for no good reason: No, not at all  I haven't been able to cope lately: No, I have been coping as well as ever  I have been so unhappy that I have had difficulty sleeping: Not at all  I have felt sad or miserable: No, not at all  I have been so unhappy that I have been crying: No, never  The thought of harming myself has occurred to me: Never  Total: 0    Physical Exam  Appearance:  Normal appearance  Cardiovascular:  Normal rate, Capillary refill less than 2 seconds  Pulmonary:  Normal effort, no distress  Abdominal:  No tenderness  MS:  No Swelling, No

## 2024-12-04 ENCOUNTER — NURSE ONLY (OUTPATIENT)
Dept: OBGYN CLINIC | Age: 24
End: 2024-12-04

## 2024-12-04 VITALS
WEIGHT: 138 LBS | DIASTOLIC BLOOD PRESSURE: 60 MMHG | BODY MASS INDEX: 22.96 KG/M2 | SYSTOLIC BLOOD PRESSURE: 102 MMHG | HEART RATE: 53 BPM

## 2024-12-04 DIAGNOSIS — N94.6 DYSMENORRHEA: ICD-10-CM

## 2024-12-04 DIAGNOSIS — Z30.011 ORAL CONTRACEPTION INITIAL PRESCRIPTION: Primary | ICD-10-CM

## 2024-12-04 NOTE — PROGRESS NOTES
Pt is here for her depo. She says that she has been experiencing a lot of abdominal pains and issues on it though. She would like to start an OCP instead if possible . Discussed other birth control options but she wishes to initate an oral contraceptive therapy.

## 2024-12-05 DIAGNOSIS — N94.6 DYSMENORRHEA: Primary | ICD-10-CM

## 2025-01-06 ENCOUNTER — HOSPITAL ENCOUNTER (EMERGENCY)
Age: 25
Discharge: HOME | End: 2025-01-06
Attending: EMERGENCY MEDICINE
Payer: COMMERCIAL

## 2025-01-06 VITALS
DIASTOLIC BLOOD PRESSURE: 62 MMHG | HEART RATE: 78 BPM | SYSTOLIC BLOOD PRESSURE: 91 MMHG | TEMPERATURE: 98 F | HEIGHT: 65 IN | RESPIRATION RATE: 20 BRPM | BODY MASS INDEX: 23.32 KG/M2 | OXYGEN SATURATION: 100 % | WEIGHT: 140 LBS

## 2025-01-06 DIAGNOSIS — R11.2 NAUSEA AND VOMITING, UNSPECIFIED VOMITING TYPE: Primary | ICD-10-CM

## 2025-01-06 LAB
ALBUMIN SERPL-MCNC: 5.1 G/DL (ref 3.5–4.6)
ALP SERPL-CCNC: 62 U/L (ref 40–130)
ALT SERPL-CCNC: 10 U/L (ref 0–33)
AMPHET UR QL SCN: ABNORMAL
ANION GAP SERPL CALCULATED.3IONS-SCNC: 15 MEQ/L (ref 9–15)
AST SERPL-CCNC: 17 U/L (ref 0–35)
BARBITURATES UR QL SCN: ABNORMAL
BASOPHILS # BLD: 0 K/UL (ref 0–0.2)
BASOPHILS NFR BLD: 0.3 %
BENZODIAZ UR QL SCN: ABNORMAL
BILIRUB SERPL-MCNC: 0.5 MG/DL (ref 0.2–0.7)
BUN SERPL-MCNC: 12 MG/DL (ref 6–20)
CALCIUM SERPL-MCNC: 9.5 MG/DL (ref 8.5–9.9)
CANNABINOIDS UR QL SCN: POSITIVE
CHLORIDE SERPL-SCNC: 108 MEQ/L (ref 95–107)
CO2 SERPL-SCNC: 23 MEQ/L (ref 20–31)
COCAINE UR QL SCN: ABNORMAL
CREAT SERPL-MCNC: 0.74 MG/DL (ref 0.5–0.9)
DRUG SCREEN COMMENT UR-IMP: ABNORMAL
EOSINOPHIL # BLD: 0 K/UL (ref 0–0.7)
EOSINOPHIL NFR BLD: 0 %
ERYTHROCYTE [DISTWIDTH] IN BLOOD BY AUTOMATED COUNT: 12.9 % (ref 11.5–14.5)
ETHANOL PERCENT: NORMAL G/DL
ETHANOLAMINE SERPL-MCNC: <10 MG/DL (ref 0–0.08)
FENTANYL SCREEN, URINE: ABNORMAL
GLOBULIN SER CALC-MCNC: 3 G/DL (ref 2.3–3.5)
GLUCOSE SERPL-MCNC: 87 MG/DL (ref 70–99)
HCG, URINE, POC: NEGATIVE
HCT VFR BLD AUTO: 42.1 % (ref 37–47)
HGB BLD-MCNC: 14.4 G/DL (ref 12–16)
LIPASE SERPL-CCNC: 24 U/L (ref 12–95)
LYMPHOCYTES # BLD: 1.2 K/UL (ref 1–4.8)
LYMPHOCYTES NFR BLD: 9 %
Lab: NORMAL
MAGNESIUM SERPL-MCNC: 1.8 MG/DL (ref 1.7–2.4)
MCH RBC QN AUTO: 29 PG (ref 27–31.3)
MCHC RBC AUTO-ENTMCNC: 34.2 % (ref 33–37)
MCV RBC AUTO: 84.9 FL (ref 79.4–94.8)
METHADONE UR QL SCN: ABNORMAL
MONOCYTES # BLD: 0.3 K/UL (ref 0.2–0.8)
MONOCYTES NFR BLD: 2.1 %
NEGATIVE QC PASS/FAIL: NORMAL
NEUTROPHILS # BLD: 11.5 K/UL (ref 1.4–6.5)
NEUTS SEG NFR BLD: 88.1 %
OPIATES UR QL SCN: ABNORMAL
OXYCODONE UR QL SCN: ABNORMAL
PCP UR QL SCN: ABNORMAL
PLATELET # BLD AUTO: 236 K/UL (ref 130–400)
POSITIVE QC PASS/FAIL: NORMAL
POTASSIUM SERPL-SCNC: 3.9 MEQ/L (ref 3.4–4.9)
PROPOXYPH UR QL SCN: ABNORMAL
PROT SERPL-MCNC: 8.1 G/DL (ref 6.3–8)
RBC # BLD AUTO: 4.96 M/UL (ref 4.2–5.4)
SODIUM SERPL-SCNC: 146 MEQ/L (ref 135–144)
WBC # BLD AUTO: 13.1 K/UL (ref 4.8–10.8)

## 2025-01-06 PROCEDURE — 6360000002 HC RX W HCPCS: Performed by: EMERGENCY MEDICINE

## 2025-01-06 PROCEDURE — 2500000003 HC RX 250 WO HCPCS: Performed by: EMERGENCY MEDICINE

## 2025-01-06 PROCEDURE — 96372 THER/PROPH/DIAG INJ SC/IM: CPT

## 2025-01-06 PROCEDURE — 36415 COLL VENOUS BLD VENIPUNCTURE: CPT

## 2025-01-06 PROCEDURE — 83690 ASSAY OF LIPASE: CPT

## 2025-01-06 PROCEDURE — 83735 ASSAY OF MAGNESIUM: CPT

## 2025-01-06 PROCEDURE — 80307 DRUG TEST PRSMV CHEM ANLYZR: CPT

## 2025-01-06 PROCEDURE — 2580000003 HC RX 258: Performed by: EMERGENCY MEDICINE

## 2025-01-06 PROCEDURE — 82077 ASSAY SPEC XCP UR&BREATH IA: CPT

## 2025-01-06 PROCEDURE — 96361 HYDRATE IV INFUSION ADD-ON: CPT

## 2025-01-06 PROCEDURE — 80053 COMPREHEN METABOLIC PANEL: CPT

## 2025-01-06 PROCEDURE — 96374 THER/PROPH/DIAG INJ IV PUSH: CPT

## 2025-01-06 PROCEDURE — 96375 TX/PRO/DX INJ NEW DRUG ADDON: CPT

## 2025-01-06 PROCEDURE — 99284 EMERGENCY DEPT VISIT MOD MDM: CPT

## 2025-01-06 PROCEDURE — 85025 COMPLETE CBC W/AUTO DIFF WBC: CPT

## 2025-01-06 RX ORDER — DICYCLOMINE HYDROCHLORIDE 10 MG/ML
20 INJECTION INTRAMUSCULAR ONCE
Status: COMPLETED | OUTPATIENT
Start: 2025-01-06 | End: 2025-01-06

## 2025-01-06 RX ORDER — FAMOTIDINE 20 MG/1
20 TABLET, FILM COATED ORAL 2 TIMES DAILY
Qty: 60 TABLET | Refills: 3 | Status: SHIPPED | OUTPATIENT
Start: 2025-01-06

## 2025-01-06 RX ORDER — ONDANSETRON 4 MG/1
4 TABLET, ORALLY DISINTEGRATING ORAL 3 TIMES DAILY PRN
Qty: 21 TABLET | Refills: 0 | Status: SHIPPED | OUTPATIENT
Start: 2025-01-06

## 2025-01-06 RX ORDER — PROMETHAZINE HYDROCHLORIDE 25 MG/1
25 SUPPOSITORY RECTAL 2 TIMES DAILY PRN
Qty: 12 SUPPOSITORY | Refills: 0 | Status: SHIPPED | OUTPATIENT
Start: 2025-01-06 | End: 2025-01-13

## 2025-01-06 RX ORDER — KETOROLAC TROMETHAMINE 30 MG/ML
30 INJECTION, SOLUTION INTRAMUSCULAR; INTRAVENOUS ONCE
Status: COMPLETED | OUTPATIENT
Start: 2025-01-06 | End: 2025-01-06

## 2025-01-06 RX ORDER — PROCHLORPERAZINE EDISYLATE 5 MG/ML
10 INJECTION INTRAMUSCULAR; INTRAVENOUS ONCE
Status: COMPLETED | OUTPATIENT
Start: 2025-01-06 | End: 2025-01-06

## 2025-01-06 RX ORDER — 0.9 % SODIUM CHLORIDE 0.9 %
1000 INTRAVENOUS SOLUTION INTRAVENOUS ONCE
Status: COMPLETED | OUTPATIENT
Start: 2025-01-06 | End: 2025-01-06

## 2025-01-06 RX ORDER — SUCRALFATE ORAL 1 G/10ML
1 SUSPENSION ORAL 4 TIMES DAILY
Qty: 1200 ML | Refills: 3 | Status: SHIPPED | OUTPATIENT
Start: 2025-01-06

## 2025-01-06 RX ORDER — DICYCLOMINE HCL 20 MG
20 TABLET ORAL 3 TIMES DAILY PRN
Qty: 30 TABLET | Refills: 0 | Status: SHIPPED | OUTPATIENT
Start: 2025-01-06

## 2025-01-06 RX ADMIN — SODIUM CHLORIDE 1000 ML: 9 INJECTION, SOLUTION INTRAVENOUS at 15:35

## 2025-01-06 RX ADMIN — KETOROLAC TROMETHAMINE 30 MG: 30 INJECTION, SOLUTION INTRAMUSCULAR at 15:37

## 2025-01-06 RX ADMIN — DICYCLOMINE HYDROCHLORIDE 20 MG: 10 INJECTION, SOLUTION INTRAMUSCULAR at 15:37

## 2025-01-06 RX ADMIN — FAMOTIDINE 20 MG: 10 INJECTION, SOLUTION INTRAVENOUS at 15:37

## 2025-01-06 RX ADMIN — PROCHLORPERAZINE EDISYLATE 10 MG: 5 INJECTION INTRAMUSCULAR; INTRAVENOUS at 15:37

## 2025-01-06 ASSESSMENT — PAIN SCALES - GENERAL
PAINLEVEL_OUTOF10: 5
PAINLEVEL_OUTOF10: 6

## 2025-01-06 ASSESSMENT — ENCOUNTER SYMPTOMS
NAUSEA: 1
ABDOMINAL PAIN: 1
DIARRHEA: 1
VOMITING: 1
SHORTNESS OF BREATH: 0

## 2025-01-06 ASSESSMENT — PAIN - FUNCTIONAL ASSESSMENT
PAIN_FUNCTIONAL_ASSESSMENT: NONE - DENIES PAIN
PAIN_FUNCTIONAL_ASSESSMENT: ACTIVITIES ARE NOT PREVENTED
PAIN_FUNCTIONAL_ASSESSMENT: 0-10

## 2025-01-06 ASSESSMENT — PAIN DESCRIPTION - LOCATION
LOCATION: ABDOMEN
LOCATION: ABDOMEN

## 2025-01-06 ASSESSMENT — PAIN DESCRIPTION - DESCRIPTORS
DESCRIPTORS: ACHING
DESCRIPTORS: BURNING

## 2025-01-06 ASSESSMENT — PAIN DESCRIPTION - ORIENTATION: ORIENTATION: RIGHT;LEFT

## 2025-01-06 NOTE — ED PROVIDER NOTES
Basic Information   Time Seen: 1:43 PM   Primary Care Provider: Regine Boyer, DENISE - RADHA     Chief Complaint   Patient presents with    Vomiting      HPI   Fernanda Knapp is a 24 yrs female who presents with nausea, vomiting, diarrhea, abdominal upset, symptoms started this morning.  Patient states that she drink a significant amount of alcohol last evening and has felt sick ever since.  Believes she has alcohol poisoning.  Patient denies significant abdominal pain, denies fever or chills, denies dysuria, hematuria, urinary urgency or frequency.  Nonbloody emesis and diarrhea reported.  Denies any vaginal discharge, concern for STI or pregnancy.  Patient states she is having vaginal bleeding with this has been ongoing since delivery of her child in August.  States this is unchanged at present.  No prior abdominal surgeries.     Physical Exam     /68 (01/06/25 1337)    Temp 98 °F (36.7 °C) (01/06/25 1337)    Pulse 88 (01/06/25 1337)   Resp 16 (01/06/25 1337)    SpO2 97 % (01/06/25 1337)       General: Awake and Alert, no acute distress  Appears grossly well-hydrated   CV: RRR, S1, S2   Resp: LCTAB, even and non labored   Other: No localized tenderness to abdomen, no guarding, limited exam in triage setting   Impression and Plan     Labs Reviewed   CBC WITH AUTO DIFFERENTIAL   COMPREHENSIVE METABOLIC PANEL   MAGNESIUM   LIPASE   ETHANOL   URINE DRUG SCREEN   POC PREGNANCY UR-QUAL        No orders to display      Final Impression   I have performed a medical screening exam on Fernanda Knapp. Based on this patient's chief complaint/symptoms of   Chief Complaint   Patient presents with    Vomiting    and my focused exam, their care will be started and transitioned to provider when room is available       Leticia Dewitt PA  01/06/25 3216

## 2025-01-06 NOTE — ED PROVIDER NOTES
[facility] ED  EMERGENCY DEPARTMENT ENCOUNTER      Pt Name: Fernanda Knapp  MRN: [id number]  Birthdate 2000  Date of evaluation: 1/6/2025  Provider: Héctor Martinez MD  3:08 PM EST    CHIEF COMPLAINT       Chief Complaint   Patient presents with    Vomiting         HISTORY OF PRESENT ILLNESS   (Location/Symptom, Timing/Onset, Context/Setting, Quality, Duration, Modifying Factors, Severity)  Note limiting factors.   Fernanda Knapp is a 24 y.o. female who presents to the emergency department via private vehicle for evaluation of nausea.  She says that she was at a work party yesterday, does not normally drink alcohol but yesterday drank a lot of alcohol.  That today she woke up hangover with nausea, nonbloody emesis, nonbloody diarrhea.  Reports some upper abdominal discomfort, pressure, she says it feels like \"acid\".  No fever, neck or jaw pain, chest pain or difficulty breathing, flank pain, dysuria, vaginal complaints, blood in stool.    HPI  Chart review notes history of anxiety/depression  Nursing Notes were reviewed.    REVIEW OF SYSTEMS    (2-9 systems for level 4, 10 or more for level 5)     Review of Systems   Constitutional:  Negative for fever.   Eyes:  Negative for visual disturbance.   Respiratory:  Negative for shortness of breath.    Cardiovascular:  Negative for chest pain.   Gastrointestinal:  Positive for abdominal pain, diarrhea, nausea and vomiting.   Genitourinary:  Negative for dysuria and flank pain.   Musculoskeletal:  Negative for neck stiffness.   Neurological:  Negative for syncope.   All other systems reviewed and are negative.      Except as noted above the remainder of the review of systems was reviewed and negative.       PAST MEDICAL HISTORY     Past Medical History:   Diagnosis Date    Anxiety 09/13/2017    Depression 09/13/2017    Herpes simplex virus (HSV) infection 2020    History of herpes genitalis 12/01/2021         SURGICAL HISTORY       Past Surgical

## 2025-01-06 NOTE — ED TRIAGE NOTES
Patient arrived to ER by private vehicle with fiance.   Pt states she was at a work party yesterday and had too much alcohol.   Pt c/o N/V/D and abdominal pain.

## 2025-03-27 ENCOUNTER — OFFICE VISIT (OUTPATIENT)
Dept: FAMILY MEDICINE CLINIC | Age: 25
End: 2025-03-27

## 2025-03-27 VITALS
WEIGHT: 137.8 LBS | OXYGEN SATURATION: 98 % | HEIGHT: 65 IN | BODY MASS INDEX: 22.96 KG/M2 | SYSTOLIC BLOOD PRESSURE: 112 MMHG | HEART RATE: 66 BPM | DIASTOLIC BLOOD PRESSURE: 80 MMHG | TEMPERATURE: 97.9 F

## 2025-03-27 DIAGNOSIS — R10.84 GENERALIZED ABDOMINAL PAIN: ICD-10-CM

## 2025-03-27 DIAGNOSIS — R10.84 GENERALIZED ABDOMINAL PAIN: Primary | ICD-10-CM

## 2025-03-27 LAB
ALBUMIN SERPL-MCNC: 4.4 G/DL (ref 3.5–4.6)
ALP SERPL-CCNC: 68 U/L (ref 40–130)
ALT SERPL-CCNC: 6 U/L (ref 0–33)
ANION GAP SERPL CALCULATED.3IONS-SCNC: 11 MEQ/L (ref 9–15)
AST SERPL-CCNC: 16 U/L (ref 0–35)
BASOPHILS # BLD: 0 K/UL (ref 0–0.2)
BASOPHILS NFR BLD: 0.3 %
BILIRUB SERPL-MCNC: 0.3 MG/DL (ref 0.2–0.7)
BUN SERPL-MCNC: 10 MG/DL (ref 6–20)
CALCIUM SERPL-MCNC: 9.3 MG/DL (ref 8.5–9.9)
CHLORIDE SERPL-SCNC: 106 MEQ/L (ref 95–107)
CO2 SERPL-SCNC: 24 MEQ/L (ref 20–31)
CREAT SERPL-MCNC: 0.62 MG/DL (ref 0.5–0.9)
CRP SERPL HS-MCNC: <3 MG/L (ref 0–5)
EOSINOPHIL # BLD: 0.1 K/UL (ref 0–0.7)
EOSINOPHIL NFR BLD: 1.2 %
ERYTHROCYTE [DISTWIDTH] IN BLOOD BY AUTOMATED COUNT: 13.3 % (ref 11.5–14.5)
ERYTHROCYTE [SEDIMENTATION RATE] IN BLOOD BY WESTERGREN METHOD: 18 MM (ref 0–20)
GLOBULIN SER CALC-MCNC: 3.2 G/DL (ref 2.3–3.5)
GLUCOSE SERPL-MCNC: 93 MG/DL (ref 70–99)
HCT VFR BLD AUTO: 39.7 % (ref 37–47)
HGB BLD-MCNC: 13.4 G/DL (ref 12–16)
LYMPHOCYTES # BLD: 2.3 K/UL (ref 1–4.8)
LYMPHOCYTES NFR BLD: 34.1 %
MCH RBC QN AUTO: 28 PG (ref 27–31.3)
MCHC RBC AUTO-ENTMCNC: 33.8 % (ref 33–37)
MCV RBC AUTO: 83.1 FL (ref 79.4–94.8)
MONOCYTES # BLD: 0.4 K/UL (ref 0.2–0.8)
MONOCYTES NFR BLD: 6.1 %
NEUTROPHILS # BLD: 4 K/UL (ref 1.4–6.5)
NEUTS SEG NFR BLD: 58 %
PLATELET # BLD AUTO: 257 K/UL (ref 130–400)
POTASSIUM SERPL-SCNC: 3.8 MEQ/L (ref 3.4–4.9)
PROT SERPL-MCNC: 7.6 G/DL (ref 6.3–8)
RBC # BLD AUTO: 4.78 M/UL (ref 4.2–5.4)
SODIUM SERPL-SCNC: 141 MEQ/L (ref 135–144)
TSH REFLEX: 1.38 UIU/ML (ref 0.44–3.86)
WBC # BLD AUTO: 6.9 K/UL (ref 4.8–10.8)

## 2025-03-27 ASSESSMENT — ENCOUNTER SYMPTOMS
ABDOMINAL PAIN: 1
COUGH: 0
SHORTNESS OF BREATH: 0
NAUSEA: 1
DIARRHEA: 1

## 2025-03-27 ASSESSMENT — PATIENT HEALTH QUESTIONNAIRE - PHQ9
1. LITTLE INTEREST OR PLEASURE IN DOING THINGS: NOT AT ALL
SUM OF ALL RESPONSES TO PHQ QUESTIONS 1-9: 0
2. FEELING DOWN, DEPRESSED OR HOPELESS: NOT AT ALL
SUM OF ALL RESPONSES TO PHQ9 QUESTIONS 1 & 2: 0
2. FEELING DOWN, DEPRESSED OR HOPELESS: NOT AT ALL
SUM OF ALL RESPONSES TO PHQ QUESTIONS 1-9: 0
1. LITTLE INTEREST OR PLEASURE IN DOING THINGS: NOT AT ALL
SUM OF ALL RESPONSES TO PHQ QUESTIONS 1-9: 0
SUM OF ALL RESPONSES TO PHQ QUESTIONS 1-9: 0

## 2025-03-27 NOTE — PROGRESS NOTES
Subjective  Chief Complaint   Patient presents with    Abdominal Pain     On and off for past 2 years. Across [facility]. Nausea/D. Tried Ibuprofen no relief. Pain level 7.        HPI    History of Present Illness  The patient presents for evaluation of abdominal pain.    Intermittent abdominal discomfort has been experienced for the past few years, described as a sensation akin to internal bruising. The onset of this pain was noted a few months postpartum following the birth of her first child. The pain, located in the mid-abdomen, is exacerbated by movement, urination, and sexual intercourse. It is severe for several days, accompanied by watery diarrhea, and subsides temporarily before recurring. No presence of blood or mucus in the stool is reported. Despite attempts to monitor her diet, no food triggers have been identified. Medical attention was sought at a hospital where a stool sample revealed infectious colitis. Following treatment, symptoms subsided until her second pregnancy. Approximately 6 to 7 months postpartum, recurrent episodes of the same pain began. Attempts to alleviate the discomfort with ibuprofen have proven ineffective. A previous telehealth consultation recommended a colonoscopy, but the procedure was not completed.    FAMILY HISTORY  Both paternal grandparents have had significant problems with their bowels and colon.    There are no active problems to display for this patient.    Past Medical History:   Diagnosis Date    Anxiety 09/13/2017    Depression 09/13/2017    Herpes simplex virus (HSV) infection 2020    History of herpes genitalis 12/01/2021     Past Surgical History:   Procedure Laterality Date    DENTAL SURGERY  2008    DENTAL EXTRACTIONS    FOOT SURGERY Bilateral 2012    for flat feet    TONSILLECTOMY      TONSILLECTOMY AND ADENOIDECTOMY N/A 12/14/2017    TONSILLECTOMY  AND ADENOIDECTOMY performed by Asad Yun MD at [facility] [place]    WISDOM TOOTH EXTRACTION Bilateral 2023

## 2025-03-28 ENCOUNTER — OFFICE VISIT (OUTPATIENT)
Dept: GASTROENTEROLOGY | Age: 25
End: 2025-03-28

## 2025-03-28 ENCOUNTER — PREP FOR PROCEDURE (OUTPATIENT)
Dept: GASTROENTEROLOGY | Age: 25
End: 2025-03-28

## 2025-03-28 ENCOUNTER — RESULTS FOLLOW-UP (OUTPATIENT)
Dept: FAMILY MEDICINE CLINIC | Age: 25
End: 2025-03-28

## 2025-03-28 VITALS
DIASTOLIC BLOOD PRESSURE: 74 MMHG | HEART RATE: 54 BPM | BODY MASS INDEX: 22.47 KG/M2 | SYSTOLIC BLOOD PRESSURE: 112 MMHG | WEIGHT: 135 LBS | OXYGEN SATURATION: 99 %

## 2025-03-28 DIAGNOSIS — R19.4 ALTERED BOWEL HABITS: ICD-10-CM

## 2025-03-28 DIAGNOSIS — R10.33 PERIUMBILICAL ABDOMINAL CRAMPING: ICD-10-CM

## 2025-03-28 DIAGNOSIS — R10.33 PERIUMBILICAL ABDOMINAL PAIN: ICD-10-CM

## 2025-03-28 DIAGNOSIS — R19.4 ALTERED BOWEL HABITS: Primary | ICD-10-CM

## 2025-03-28 LAB
BARLEY IGE QN: <0.1 KU/L (ref 0–0.34)
BEEF IGE QN: 0.18 KU/L (ref 0–0.34)
BELL PEPPER IGE QN: <0.1 KU/L (ref 0–0.34)
CABBAGE IGE QN: <0.1 KU/L (ref 0–0.34)
CARROT IGE QN: <0.1 KU/L (ref 0–0.34)
CHICKEN SERUM PROT IGE QN: <0.1 KU/L (ref 0–0.34)
CODFISH IGE QN: <0.1 KU/L (ref 0–0.34)
CORN IGE QN: <0.1 KU/L (ref 0–0.34)
COW MILK IGE QN: 0.14 KU/L (ref 0–0.34)
CRAB IGE QN: <0.1 KU/L (ref 0–0.34)
EGG WHITE IGE QN: <0.1 KU/L (ref 0–0.34)
GRAPE IGE QN: <0.1 KU/L (ref 0–0.34)
IGE SERPL-ACNC: 2 IU/ML (ref 0–100)
LETTUCE IGE QN: <0.1 KU/L (ref 0–0.34)
OAT IGE QN: <0.1 KU/L (ref 0–0.34)
ORANGE IGE QN: <0.1 KU/L (ref 0–0.34)
PEANUT IGE QN: <0.1 KU/L (ref 0–0.34)
PORK IGE QN: <0.1 KU/L (ref 0–0.34)
POTATO IGE QN: <0.1 KU/L (ref 0–0.34)
RICE IGE QN: <0.1 KU/L (ref 0–0.34)
RYE IGE QN: <0.1 KU/L (ref 0–0.34)
SHRIMP IGE QN: <0.1 KU/L (ref 0–0.34)
SOYBEAN IGE QN: <0.1 KU/L (ref 0–0.34)
TOMATO IGE QN: <0.1 KU/L (ref 0–0.34)
TUNA IGE QN: <0.1 KU/L (ref 0–0.34)
WHEAT IGE QN: <0.1 KU/L (ref 0–0.34)
WHITE BEAN IGE QN: <0.1 KU/L (ref 0–0.34)

## 2025-03-28 RX ORDER — POLYETHYLENE GLYCOL 3350, SODIUM CHLORIDE, SODIUM BICARBONATE, POTASSIUM CHLORIDE 420; 11.2; 5.72; 1.48 G/4L; G/4L; G/4L; G/4L
4000 POWDER, FOR SOLUTION ORAL ONCE
Qty: 4000 ML | Refills: 0 | Status: SHIPPED | OUTPATIENT
Start: 2025-03-28 | End: 2025-03-28

## 2025-03-28 RX ORDER — SODIUM CHLORIDE 9 MG/ML
INJECTION, SOLUTION INTRAVENOUS PRN
Status: CANCELLED | OUTPATIENT
Start: 2025-03-28

## 2025-03-28 RX ORDER — SODIUM CHLORIDE 0.9 % (FLUSH) 0.9 %
5-40 SYRINGE (ML) INJECTION EVERY 12 HOURS SCHEDULED
Status: CANCELLED | OUTPATIENT
Start: 2025-03-28

## 2025-03-28 RX ORDER — SODIUM CHLORIDE 9 MG/ML
INJECTION, SOLUTION INTRAVENOUS CONTINUOUS
Status: CANCELLED | OUTPATIENT
Start: 2025-03-28

## 2025-03-28 RX ORDER — SODIUM CHLORIDE 0.9 % (FLUSH) 0.9 %
5-40 SYRINGE (ML) INJECTION PRN
Status: CANCELLED | OUTPATIENT
Start: 2025-03-28

## 2025-03-28 ASSESSMENT — ENCOUNTER SYMPTOMS
DIARRHEA: 1
ABDOMINAL DISTENTION: 0
NAUSEA: 0
CONSTIPATION: 0
VOMITING: 0
ABDOMINAL PAIN: 1
EYES NEGATIVE: 1
ANAL BLEEDING: 0
RECTAL PAIN: 0
RESPIRATORY NEGATIVE: 1
BLOOD IN STOOL: 0

## 2025-03-28 NOTE — PROGRESS NOTES
Gastroenterology Clinic Follow up Visit    Fernanda Knapp  [id number]  Chief Complaint   Patient presents with    New Patient     NP abd pain, coming and going stays for a few days and then go away       HPI and A/P at last visit summarized below: 25-year-old female with history of intermittent periumbilical pain since last 2 years.,  Pain is intermittent and occurs at least twice a week.  Pain last for a day or 2 and pain is relieved by straining and having a BM and noticed that the stool is watery, noticed mucousy stool few times but no history of rectal bleed.  Has nausea but no emesis.  No known precipitating factor for the pain.  No weight loss..  Stool is soft even when the patient does not experience abdominal pain.  No fever no chills, able to tolerate any food.  No arthralgia or back pain or skin rash.  No symptoms of GERD family history both grandparents had some GI issues does not know the details.  Social history smokes marijuana does not drink alcohol, CBC and CMP from 3/27/2025 was unremarkable, ESR is 18, celiac panel is pending, patient had a CT of the abdomen pelvis done in August 2023 which showed thickened colonic wall      Review of Systems   Constitutional: Negative.    HENT: Negative.     Eyes: Negative.    Respiratory: Negative.     Cardiovascular: Negative.    Gastrointestinal:  Positive for abdominal pain and diarrhea. Negative for abdominal distention, anal bleeding, blood in stool, constipation, nausea, rectal pain and vomiting.   Endocrine: Negative.    Genitourinary: Negative.    Musculoskeletal: Negative.    Skin: Negative.    Allergic/Immunologic: Negative for food allergies.   Neurological: Negative.    Hematological: Negative.    Psychiatric/Behavioral: Negative.          Past medical history, past surgical history, medication list, social and familyhistory reviewed    Blood pressure 112/74, pulse 54, weight 61.2 kg (135 lb), last menstrual period 03/03/2025, SpO2 99%, not

## 2025-03-30 LAB — TTG IGA SER IA-ACNC: 2.21 FLU (ref 0–4.99)

## 2025-04-01 DIAGNOSIS — R10.84 GENERALIZED ABDOMINAL PAIN: ICD-10-CM

## 2025-04-02 ENCOUNTER — ANESTHESIA EVENT (OUTPATIENT)
Dept: ENDOSCOPY | Age: 25
End: 2025-04-02
Payer: COMMERCIAL

## 2025-04-02 ENCOUNTER — HOSPITAL ENCOUNTER (OUTPATIENT)
Age: 25
Setting detail: OUTPATIENT SURGERY
Discharge: HOME | End: 2025-04-02
Attending: SPECIALIST | Admitting: SPECIALIST
Payer: COMMERCIAL

## 2025-04-02 ENCOUNTER — ANESTHESIA (OUTPATIENT)
Dept: ENDOSCOPY | Age: 25
End: 2025-04-02
Payer: COMMERCIAL

## 2025-04-02 VITALS
OXYGEN SATURATION: 100 % | DIASTOLIC BLOOD PRESSURE: 59 MMHG | TEMPERATURE: 97.9 F | SYSTOLIC BLOOD PRESSURE: 101 MMHG | WEIGHT: 135 LBS | HEART RATE: 63 BPM | HEIGHT: 65 IN | RESPIRATION RATE: 18 BRPM | BODY MASS INDEX: 22.49 KG/M2

## 2025-04-02 DIAGNOSIS — R10.33 PERIUMBILICAL ABDOMINAL CRAMPING: ICD-10-CM

## 2025-04-02 DIAGNOSIS — R19.4 ALTERED BOWEL HABITS: ICD-10-CM

## 2025-04-02 PROBLEM — K52.9 COLITIS: Status: ACTIVE | Noted: 2025-04-02

## 2025-04-02 PROCEDURE — 7100000010 HC PHASE II RECOVERY - FIRST 15 MIN: Performed by: SPECIALIST

## 2025-04-02 PROCEDURE — 2580000003 HC RX 258

## 2025-04-02 PROCEDURE — 88305 TISSUE EXAM BY PATHOLOGIST: CPT

## 2025-04-02 PROCEDURE — 3700000001 HC ADD 15 MINUTES (ANESTHESIA): Performed by: SPECIALIST

## 2025-04-02 PROCEDURE — 7100000011 HC PHASE II RECOVERY - ADDTL 15 MIN: Performed by: SPECIALIST

## 2025-04-02 PROCEDURE — 45380 COLONOSCOPY AND BIOPSY: CPT | Performed by: SPECIALIST

## 2025-04-02 PROCEDURE — 2709999900 HC NON-CHARGEABLE SUPPLY: Performed by: SPECIALIST

## 2025-04-02 PROCEDURE — 2500000003 HC RX 250 WO HCPCS: Performed by: SPECIALIST

## 2025-04-02 PROCEDURE — 3700000000 HC ANESTHESIA ATTENDED CARE: Performed by: SPECIALIST

## 2025-04-02 PROCEDURE — 3609027000 HC COLONOSCOPY: Performed by: SPECIALIST

## 2025-04-02 PROCEDURE — 6360000002 HC RX W HCPCS: Performed by: NURSE ANESTHETIST, CERTIFIED REGISTERED

## 2025-04-02 RX ORDER — LIDOCAINE HYDROCHLORIDE 20 MG/ML
INJECTION, SOLUTION INFILTRATION; PERINEURAL
Status: DISCONTINUED | OUTPATIENT
Start: 2025-04-02 | End: 2025-04-02 | Stop reason: SDUPTHER

## 2025-04-02 RX ORDER — PROPOFOL 10 MG/ML
INJECTION, EMULSION INTRAVENOUS
Status: DISCONTINUED | OUTPATIENT
Start: 2025-04-02 | End: 2025-04-02 | Stop reason: SDUPTHER

## 2025-04-02 RX ORDER — SODIUM CHLORIDE 9 MG/ML
INJECTION, SOLUTION INTRAVENOUS PRN
Status: DISCONTINUED | OUTPATIENT
Start: 2025-04-02 | End: 2025-04-02 | Stop reason: HOSPADM

## 2025-04-02 RX ORDER — NALOXONE HYDROCHLORIDE 0.4 MG/ML
INJECTION, SOLUTION INTRAMUSCULAR; INTRAVENOUS; SUBCUTANEOUS PRN
Status: CANCELLED | OUTPATIENT
Start: 2025-04-02

## 2025-04-02 RX ORDER — SODIUM CHLORIDE 0.9 % (FLUSH) 0.9 %
5-40 SYRINGE (ML) INJECTION PRN
Status: CANCELLED | OUTPATIENT
Start: 2025-04-02

## 2025-04-02 RX ORDER — SODIUM CHLORIDE 0.9 % (FLUSH) 0.9 %
5-40 SYRINGE (ML) INJECTION EVERY 12 HOURS SCHEDULED
Status: CANCELLED | OUTPATIENT
Start: 2025-04-02

## 2025-04-02 RX ORDER — SODIUM CHLORIDE 9 MG/ML
INJECTION, SOLUTION INTRAVENOUS
Status: COMPLETED
Start: 2025-04-02 | End: 2025-04-02

## 2025-04-02 RX ORDER — SODIUM CHLORIDE 9 MG/ML
INJECTION, SOLUTION INTRAVENOUS CONTINUOUS
Status: DISCONTINUED | OUTPATIENT
Start: 2025-04-02 | End: 2025-04-02 | Stop reason: HOSPADM

## 2025-04-02 RX ORDER — SODIUM CHLORIDE 0.9 % (FLUSH) 0.9 %
5-40 SYRINGE (ML) INJECTION PRN
Status: DISCONTINUED | OUTPATIENT
Start: 2025-04-02 | End: 2025-04-02 | Stop reason: HOSPADM

## 2025-04-02 RX ORDER — SODIUM CHLORIDE 9 MG/ML
INJECTION, SOLUTION INTRAVENOUS PRN
Status: CANCELLED | OUTPATIENT
Start: 2025-04-02

## 2025-04-02 RX ORDER — SODIUM CHLORIDE 0.9 % (FLUSH) 0.9 %
5-40 SYRINGE (ML) INJECTION EVERY 12 HOURS SCHEDULED
Status: DISCONTINUED | OUTPATIENT
Start: 2025-04-02 | End: 2025-04-02 | Stop reason: HOSPADM

## 2025-04-02 RX ADMIN — LIDOCAINE HYDROCHLORIDE 50 MG: 20 INJECTION, SOLUTION INFILTRATION; PERINEURAL at 07:34

## 2025-04-02 RX ADMIN — PROPOFOL 300 MG: 10 INJECTION, EMULSION INTRAVENOUS at 07:34

## 2025-04-02 RX ADMIN — SODIUM CHLORIDE: 0.9 INJECTION, SOLUTION INTRAVENOUS at 07:12

## 2025-04-02 RX ADMIN — SODIUM CHLORIDE: 9 INJECTION, SOLUTION INTRAVENOUS at 07:12

## 2025-04-02 RX ADMIN — PHENYLEPHRINE HYDROCHLORIDE 100 MCG: 10 INJECTION INTRAVENOUS at 07:45

## 2025-04-02 ASSESSMENT — PAIN - FUNCTIONAL ASSESSMENT
PAIN_FUNCTIONAL_ASSESSMENT: 0-10
PAIN_FUNCTIONAL_ASSESSMENT: 0-10

## 2025-04-02 NOTE — H&P
Patient Name: Fernanda Knapp  : 2000  MRN: [id number]  DATE: 25      ENDOSCOPY  History and Physical    Procedure:    [x] Diagnostic Colonoscopy       [] Screening Colonoscopy  [] EGD      [] ERCP      [] EUS       [] Other    [x] Previous office notes/History and Physical reviewed from the patients chart. Please see EMR for further details of HPI. I have examined the patient's status immediately prior to the procedure and:      Indications/HPI:    []Abdominal Pain  []Cancer- GI/Lung  []Fhx of colon CA/polyps  []History of Polyps  []Whitfield’s   []Melena  []Abnormal Imaging  []Dysphagia    []Persistent Pneumonia  []Anemia  []Food Impaction  []History of Polyps  []GI Bleed  []Pulmonary nodule/Mass  []Change in bowel habits []Heartburn/Reflux  []Rectal Bleed (BRBPR)  []Chest Pain - Non Cardiac []Heme (+) Stoo  l[]Ulcers  []Constipation  []Hemoptysis   []Varices  []Diarrhea  []Hypoxemia  []Nausea/Vomiting  []Screening   []Crohns/Colitis  []Other: Abdominal pain and diarrhea    Anesthesia:   [x] MAC [] Moderate Sedation   [] General   [] None     ROS: 12 pt Review of Symptoms was negative unless mentioned above    Medications:   Prior to Admission medications    Medication Sig Start Date End Date Taking? Authorizing Provider   ondansetron (ZOFRAN-ODT) 4 MG disintegrating tablet Take 1 tablet by mouth 3 times daily as needed for Nausea or Vomiting 25   Héctor Martinez MD   famotidine (PEPCID) 20 MG tablet Take 1 tablet by mouth 2 times daily  Patient not taking: Reported on 3/28/2025 1/6/25   Héctor Martinez MD   sucralfate (CARAFATE) 1 GM/10ML suspension Take 10 mLs by mouth 4 times daily  Patient not taking: Reported on 3/28/2025 1/6/25   Héctor Martinez MD   dicyclomine (BENTYL) 20 MG tablet Take 1 tablet by mouth 3 times daily as needed (cramps) 25   Héctor Martinez MD   Norethin Ace-Eth Estrad-FE 1-20 MG-MCG(24) TABS Take 1 tablet by mouth daily 12/5/24 3/5/25  Kaykay Kruger APRN - ROCKY        Allergies: No Known Allergies     History of allergic reaction to anesthesia:  No    Past Medical History:  Past Medical History:   Diagnosis Date    Anxiety 09/13/2017    Depression 09/13/2017    Herpes simplex virus (HSV) infection 2020    History of herpes genitalis 12/01/2021       Past Surgical History:  Past Surgical History:   Procedure Laterality Date    DENTAL SURGERY  2008    DENTAL EXTRACTIONS    FOOT SURGERY Bilateral 2012    for flat feet    TONSILLECTOMY      TONSILLECTOMY AND ADENOIDECTOMY N/A 12/14/2017    TONSILLECTOMY  AND ADENOIDECTOMY performed by Asad Yun MD at [facility] [place]    WISDOM TOOTH EXTRACTION Bilateral 2023       Social History:  Social History     Tobacco Use    Smoking status: Former     Current packs/day: 0.00     Types: Cigarettes     Quit date: 12/1/2021     Years since quitting: 3.3    Smokeless tobacco: Never   Vaping Use    Vaping status: Former    Quit date: 11/1/2021    Substances: Nicotine   Substance Use Topics    Alcohol use: Not Currently     Comment: social, in past ETOH disorder; previous rehab/ tx; last drink 9/25/2021    Drug use: Yes     Frequency: 7.0 times per week     Types: Marijuana (Weed)     Comment: 9/25/2021 xanax possibly laced with fentanyl and heroin       Vital Signs:   Vitals:    04/02/25 0709   BP: 101/63   Pulse: 69   Resp: 18   Temp: 97.9 °F (36.6 °C)   SpO2: 98%        Physical Exam:  Cardiac:  [x]WNL  []Comments:  Pulmonary:  [x]WNL   []Comments:   Neuro/Mental Status:  [x]WNL  []Comments:  Abdominal:  [x]WNL    []Comments:  Other:   []WNL  []Comments:    Informed Consent:  The risks and benefits of the procedure have been discussed with either the patient or if they cannot consent, their representative.    Assessment:  Patient examined and appropriate for planned sedation and procedure.     Plan:  Proceed with planned sedation and procedure as above.    Balwinder Dodd MD  7:30 AM

## 2025-04-02 NOTE — ANESTHESIA POSTPROCEDURE EVALUATION
Caller: Yonny Coats    JOSLYN    Additional notes: NA       Department of Anesthesiology  Postprocedure Note    Patient: Fernanda Knapp  MRN: [id number]  YOB: 2000  Date of evaluation: 4/2/2025    Procedure Summary       Date: 04/02/25 Room / Location: [facility] OR 01 / [facility]    Anesthesia Start: 0729 Anesthesia Stop: 0752    Procedure: COLONOSCOPY DIAGNOSTIC WITH BIOPSIES Diagnosis:       Altered bowel habits      Periumbilical abdominal cramping      (Altered bowel habits [R19.4])      (Periumbilical abdominal cramping [R10.33])    Surgeons: Balwinder Dodd MD Responsible Provider: Nelly Day APRN - CRNA    Anesthesia Type: MAC ASA Status: 2            Anesthesia Type: No value filed.    Serge Phase I: Serge Score: 10    Serge Phase II:      Anesthesia Post Evaluation    Patient location during evaluation: bedside  Patient participation: complete - patient participated  Level of consciousness: awake and awake and alert  Pain score: 0  Airway patency: patent  Nausea & Vomiting: no nausea and no vomiting  Cardiovascular status: blood pressure returned to baseline and hemodynamically stable  Respiratory status: acceptable  Hydration status: euvolemic  Pain management: adequate        No notable events documented.

## 2025-04-02 NOTE — ANESTHESIA PRE PROCEDURE
Department of Anesthesiology  Preprocedure Note       Name:  Fernanda Knapp   Age:  25 y.o.  :  2000                                          MRN:  [id number]         Date:  2025      Surgeon: Surgeon(s):  Balwinder Dodd MD    Procedure: Procedure(s):  COLONOSCOPY DIAGNOSTIC    Medications prior to admission:   Prior to Admission medications    Medication Sig Start Date End Date Taking? Authorizing Provider   ondansetron (ZOFRAN-ODT) 4 MG disintegrating tablet Take 1 tablet by mouth 3 times daily as needed for Nausea or Vomiting 25   Héctor Martinez MD   famotidine (PEPCID) 20 MG tablet Take 1 tablet by mouth 2 times daily  Patient not taking: Reported on 3/28/2025 1/6/25   Héctor Martinez MD   sucralfate (CARAFATE) 1 GM/10ML suspension Take 10 mLs by mouth 4 times daily  Patient not taking: Reported on 3/28/2025 1/6/25   Héctor Martinez MD   dicyclomine (BENTYL) 20 MG tablet Take 1 tablet by mouth 3 times daily as needed (cramps) 25   Héctor Martinez MD   Norethicarrie Ace-Eth Estrad-FE 1-20 MG-MCG(24) TABS Take 1 tablet by mouth daily 12/5/24 3/5/25  Kaykay Kruger APRN - ROCKY       Current medications:    Current Facility-Administered Medications   Medication Dose Route Frequency Provider Last Rate Last Admin   • 0.9 % sodium chloride infusion   IntraVENous Continuous Balwinder Dodd MD 75 mL/hr at 25 0722 NoRateChange at 25 0722   • sodium chloride flush 0.9 % injection 5-40 mL  5-40 mL IntraVENous 2 times per day Balwinder Dodd MD       • sodium chloride flush 0.9 % injection 5-40 mL  5-40 mL IntraVENous PRN Balwinder Dodd MD       • 0.9 % sodium chloride infusion   IntraVENous PRN Balwinder Dodd MD       • sterile water for irrigation                Allergies:  No Known Allergies    Problem List:    Patient Active Problem List   Diagnosis Code   • Altered bowel habits R19.4   • Periumbilical abdominal cramping R10.33       Past Medical History:        Diagnosis Date

## 2025-04-04 LAB — CALPROTECTIN STL-MCNT: 9 UG/G

## (undated) DEVICE — MEDI-VAC YANKAUER SUCTION HANDLE W/BULBOUS TIP: Brand: CARDINAL HEALTH

## (undated) DEVICE — INTENDED FOR TISSUE SEPARATION, AND OTHER PROCEDURES THAT REQUIRE A SHARP SURGICAL BLADE TO PUNCTURE OR CUT.: Brand: BARD-PARKER ® CARBON RIB-BACK BLADES

## (undated) DEVICE — GLOVE ORTHO 8   MSG9480

## (undated) DEVICE — PAD N ADH W3XL4IN POLY COT SFT PERF FLM EASILY CUT ABSRB

## (undated) DEVICE — FORCEPS BX L240CM JAW DIA2.8MM L CAP W/ NDL MIC MESH TOOTH

## (undated) DEVICE — DEFOGGER!" ANTI FOG KIT: Brand: DEROYAL

## (undated) DEVICE — GLOVE ORANGE PI 8 1/2   MSG9085

## (undated) DEVICE — 2000CC GUARDIAN II: Brand: GUARDIAN

## (undated) DEVICE — COAGULATOR SUCT 10FR L6IN HND FT SWCH VALLEYLAB

## (undated) DEVICE — TUBING IRRIGATION 140/160/180/190 SER GI ENDOSCP SMARTCAP

## (undated) DEVICE — X-RAY DETECTABLE SPONGES,16 PLY: Brand: VISTEC

## (undated) DEVICE — SPONGE SURG W1.25IN WHT TNSL COT BALL STRUNG RADPQ DISP STRL

## (undated) DEVICE — 1842 FOAM BLOCK NEEDLE COUNTER: Brand: DEVON

## (undated) DEVICE — TUBE SET 96 MM 64 MM H2O PERISTALTIC STD AUX CHANNEL

## (undated) DEVICE — MEDI-VAC NON-CONDUCTIVE SUCTION TUBING: Brand: CARDINAL HEALTH

## (undated) DEVICE — TOWEL,OR,DSP,ST,BLUE,STD,4/PK,20PK/CS: Brand: MEDLINE

## (undated) DEVICE — GLOVE ORANGE PI 7 1/2   MSG9075

## (undated) DEVICE — LABEL MED MINI W/ MARKER

## (undated) DEVICE — GOWN PACK, LG: Brand: CONVERTORS

## (undated) DEVICE — ELECTRODE PT RET AD L9FT HI MOIST COND ADH HYDRGEL CORDED

## (undated) DEVICE — SYRINGE EAR 2OZ BLB ULC PEEL PCH FOR IRRIG SUCTIONING

## (undated) DEVICE — BRUSH ENDO CLN L90.5IN SHTH DIA1.7MM BRIST DIA5-7MM 2-6MM

## (undated) DEVICE — ENDO CARRY-ON PROCEDURE KIT: Brand: ENDO CARRY-ON PROCEDURE KIT

## (undated) DEVICE — PACK,SET UP,DRAPE: Brand: MEDLINE

## (undated) DEVICE — TUBING, SUCTION, 1/4" X 10', STRAIGHT: Brand: MEDLINE